# Patient Record
Sex: FEMALE | Race: BLACK OR AFRICAN AMERICAN | NOT HISPANIC OR LATINO | Employment: OTHER | URBAN - METROPOLITAN AREA
[De-identification: names, ages, dates, MRNs, and addresses within clinical notes are randomized per-mention and may not be internally consistent; named-entity substitution may affect disease eponyms.]

---

## 2017-01-15 ENCOUNTER — HOSPITAL ENCOUNTER (EMERGENCY)
Facility: HOSPITAL | Age: 47
Discharge: HOME | End: 2017-01-15
Attending: EMERGENCY MEDICINE
Payer: MEDICAID

## 2017-01-15 VITALS
HEART RATE: 80 BPM | DIASTOLIC BLOOD PRESSURE: 92 MMHG | SYSTOLIC BLOOD PRESSURE: 135 MMHG | WEIGHT: 160 LBS | HEIGHT: 63 IN | RESPIRATION RATE: 17 BRPM | BODY MASS INDEX: 28.35 KG/M2 | OXYGEN SATURATION: 100 % | TEMPERATURE: 98 F

## 2017-01-15 DIAGNOSIS — R51.9 ACUTE NONINTRACTABLE HEADACHE, UNSPECIFIED HEADACHE TYPE: Primary | ICD-10-CM

## 2017-01-15 DIAGNOSIS — R11.0 NAUSEA: ICD-10-CM

## 2017-01-15 LAB
B-HCG UR QL: NEGATIVE
BILIRUB UR QL STRIP: NEGATIVE
CLARITY UR: CLEAR
COLOR UR: YELLOW
CTP QC/QA: YES
GLUCOSE UR QL STRIP: NEGATIVE
HGB UR QL STRIP: ABNORMAL
KETONES UR QL STRIP: NEGATIVE
LEUKOCYTE ESTERASE UR QL STRIP: NEGATIVE
NITRITE UR QL STRIP: NEGATIVE
PH UR STRIP: 6 [PH] (ref 5–8)
PROT UR QL STRIP: NEGATIVE
SP GR UR STRIP: 1.02 (ref 1–1.03)
URN SPEC COLLECT METH UR: ABNORMAL
UROBILINOGEN UR STRIP-ACNC: NEGATIVE EU/DL

## 2017-01-15 PROCEDURE — 63600175 PHARM REV CODE 636 W HCPCS: Performed by: PHYSICIAN ASSISTANT

## 2017-01-15 PROCEDURE — 99283 EMERGENCY DEPT VISIT LOW MDM: CPT | Mod: 25

## 2017-01-15 PROCEDURE — 81003 URINALYSIS AUTO W/O SCOPE: CPT

## 2017-01-15 PROCEDURE — 96372 THER/PROPH/DIAG INJ SC/IM: CPT

## 2017-01-15 RX ORDER — KETOROLAC TROMETHAMINE 30 MG/ML
30 INJECTION, SOLUTION INTRAMUSCULAR; INTRAVENOUS
Status: COMPLETED | OUTPATIENT
Start: 2017-01-15 | End: 2017-01-15

## 2017-01-15 RX ORDER — DIPHENHYDRAMINE HYDROCHLORIDE 50 MG/ML
25 INJECTION INTRAMUSCULAR; INTRAVENOUS
Status: COMPLETED | OUTPATIENT
Start: 2017-01-15 | End: 2017-01-15

## 2017-01-15 RX ORDER — BUTALBITAL, ACETAMINOPHEN AND CAFFEINE 50; 325; 40 MG/1; MG/1; MG/1
1 TABLET ORAL EVERY 4 HOURS PRN
Qty: 15 TABLET | Refills: 0 | Status: SHIPPED | OUTPATIENT
Start: 2017-01-15 | End: 2017-02-14

## 2017-01-15 RX ORDER — PROCHLORPERAZINE MALEATE 5 MG
10 TABLET ORAL
Status: COMPLETED | OUTPATIENT
Start: 2017-01-15 | End: 2017-01-15

## 2017-01-15 RX ORDER — DIPHENHYDRAMINE HCL 25 MG
25 CAPSULE ORAL
Status: DISCONTINUED | OUTPATIENT
Start: 2017-01-15 | End: 2017-01-15

## 2017-01-15 RX ADMIN — PROCHLORPERAZINE MALEATE 10 MG: 5 TABLET, FILM COATED ORAL at 12:01

## 2017-01-15 RX ADMIN — DIPHENHYDRAMINE HYDROCHLORIDE 25 MG: 50 INJECTION, SOLUTION INTRAMUSCULAR; INTRAVENOUS at 12:01

## 2017-01-15 RX ADMIN — KETOROLAC TROMETHAMINE 30 MG: 30 INJECTION, SOLUTION INTRAMUSCULAR at 12:01

## 2017-01-15 NOTE — ED PROVIDER NOTES
Encounter Date: 1/15/2017       History     Chief Complaint   Patient presents with    Headache     intermittent everyday for one month; states has seen pcp and was not prescribed anything; tylenol and ibuprofen give no relief; frontal; rates pain 10/10    Flank Pain     right sided x1 day; denies dysuria or vaginal discharge;      Review of patient's allergies indicates:  No Known Allergies  Patient is a 46 y.o. female presenting with the following complaint: headaches and abdominal pain. The history is provided by the patient.   Headache    This is a new problem. The current episode started more than 1 month ago. The problem occurs constantly. The problem has been unchanged. The pain is located in the frontal region. The pain radiates to the left neck. The pain quality is not similar to prior headaches. The quality of the pain is described as sharp. Associated symptoms include nausea, photophobia and vomiting. Pertinent negatives include no abdominal pain, back pain, fever or rhinorrhea. The symptoms are aggravated by bright light. She has tried acetaminophen for the symptoms. The treatment provided no relief. Her past medical history is significant for hypertension. There is no history of migraine headaches, migraines in the family, recent head traumas, sinus disease or TMJ.   Abdominal Pain   The other symptoms of the illness include nausea and vomiting. The other symptoms of the illness do not include fever, dysuria, hematemesis, hematochezia, vaginal discharge or vaginal bleeding.   Nausea began today. The nausea is exacerbated by motion.   The vomiting began yesterday. Vomiting occurs 2 to 5 times per day. The emesis contains stomach contents.   The patient states that she believes she is currently not pregnant. The patient has not had a change in bowel habit. Symptoms associated with the illness do not include chills, constipation, urgency, hematuria, frequency or back pain. Significant associated medical  issues do not include gallstones or diverticulitis.     Past Medical History   Diagnosis Date    Hypertension      No past medical history pertinent negatives.  History reviewed. No pertinent past surgical history.  History reviewed. No pertinent family history.  Social History   Substance Use Topics    Smoking status: Never Smoker    Smokeless tobacco: None    Alcohol use No     Review of Systems   Constitutional: Negative for chills and fever.   HENT: Negative for rhinorrhea.    Eyes: Positive for photophobia.   Gastrointestinal: Positive for nausea and vomiting. Negative for abdominal pain, constipation, hematemesis and hematochezia.   Genitourinary: Negative for dysuria, frequency, hematuria, urgency, vaginal bleeding and vaginal discharge.   Musculoskeletal: Negative for back pain.   Neurological: Positive for headaches.       Physical Exam   Initial Vitals   BP Pulse Resp Temp SpO2   01/15/17 1135 01/15/17 1135 01/15/17 1135 01/15/17 1135 01/15/17 1135   117/78 96 14 97.9 °F (36.6 °C) 97 %     Physical Exam    Nursing note and vitals reviewed.  Constitutional: Vital signs are normal. She appears well-developed and well-nourished. No distress.   HENT:   Head: Normocephalic and atraumatic.   Right Ear: External ear normal.   Left Ear: External ear normal.   Nose: Nose normal.   Mouth/Throat: Oropharynx is clear and moist.   Eyes: Conjunctivae and EOM are normal.   Neck: Normal range of motion. No tracheal deviation present.   Cardiovascular: Normal rate and regular rhythm.   Pulmonary/Chest: Breath sounds normal. No respiratory distress.   Abdominal: Soft. Bowel sounds are normal. She exhibits no distension. There is no tenderness. There is no rebound and no guarding.   Musculoskeletal: Normal range of motion. She exhibits no tenderness.   Neurological: She is alert and oriented to person, place, and time. She has normal strength. No cranial nerve deficit or sensory deficit. She displays a negative Romberg  "sign. Coordination and gait normal. GCS eye subscore is 4. GCS verbal subscore is 5. GCS motor subscore is 6.   Reflex Scores:       Patellar reflexes are 2+ on the right side and 2+ on the left side.  Skin: Skin is warm and dry. No rash noted. No erythema.   Psychiatric: She has a normal mood and affect. Thought content normal.         ED Course   Procedures  Labs Reviewed   URINALYSIS - Abnormal; Notable for the following:        Result Value    Occult Blood UA Trace (*)     All other components within normal limits             Medical Decision Making:   Initial Assessment:   Headache, nausea   Differential Diagnosis:   Tension headache, pseudotumor cerebri, temporal arteritis, ICH/SAH, Intracranial mass, migraine, meningitis, cluster headache, sinus headache    Clinical Tests:   Lab Tests: Ordered and Reviewed  ED Management:  UA showed no evidence of UTI.  Feel the patient's headache is benign.  The headache completely resolved with benadryl, toradol, compazine.  No neck stiffness, vision changes, fever, rash, meningismus/neck stiffness to suggest pseudotumor cerebri or meningitis.  No pain over temporal arteries or vision changes/loss to suggest temporal arteritis.  No "thunderclap onset" or neck stiffness to suggest spontaneous SAH/ICH.  No lancinated pain to eyes with tearing to suggest cluster headache.  No sinus pressure or nasal congestion to suggest sinus headache.  Patient's headache is not in a "band like" distrubution to suggest tension headache.  Feel nausea is related to HA as abdominal exam was benign.  Nausea was resolved with medication.  Instructed to f/u with PCP if pain continues.  Given strict precautions for return to ED and verbalized understanding  RX: Fioricet                Attending Attestation:     Physician Attestation Statement for NP/PA:   I have conducted a face to face encounter with this patient in addition to the NP/PA, due to NP/PA Request    Other NP/PA Attestation Additions:  "   History of Present Illness: 46-year-old female with a frontal headache that is been intermittent over the past few weeks.  No fever, neck pain or stiffness.  Unremarkable physical exam.   Physical Exam: Unremarkable physical exam.  Cranial nerves intact.                  ED Course     Clinical Impression:   The primary encounter diagnosis was Acute nonintractable headache, unspecified headache type. A diagnosis of Nausea was also pertinent to this visit.          Nuris Munson PA-C  01/15/17 1750       Sadi Galnido MD  01/15/17 1160

## 2017-01-15 NOTE — ED AVS SNAPSHOT
[facility] [address]  [place] [address]               Lorelei Norris   1/15/2017 11:53 AM   ED    Description:  Female : 1970   Department:  [facility]           Your Care was Coordinated By:     Provider Role From To    Sadi Galindo MD Attending Provider 01/15/17 9214 --    Nuris Munson PA-C Physician Assistant 01/15/17 1201 --      Reason for Visit     Headache     Flank Pain           Diagnoses this Visit        Comments    Acute nonintractable headache, unspecified headache type    -  Primary       ED Disposition     None           To Do List           Follow-up Information     Follow up with [facility] In 3 days.    Specialty:  Family Medicine    Contact information:     [facility] [id number] [phone]       These Medications        Disp Refills Start End    butalbital-acetaminophen-caffeine -40 mg (FIORICET, ESGIC) -40 mg per tablet 15 tablet 0 1/15/2017 2017    Take 1 tablet by mouth every 4 (four) hours as needed for Pain. - Oral      [facility]     [facility] -  Assistance  Registered nurses in the [facility] provide clinical advisement, health education, appointment booking, and other advisory services.  Call for this free service at [phone].             Medications           Message regarding Medications     Verify the changes and/or additions to your medication regime listed below are the same as discussed with your clinician today.  If any of these changes or additions are incorrect, please notify your healthcare provider.        START taking these NEW medications        Refills    butalbital-acetaminophen-caffeine -40 mg (FIORICET, ESGIC) -40 mg per tablet 0    Sig: Take 1 tablet by mouth every 4 (four) hours as needed for Pain.    Class: Print    Route: Oral      These medications were  "administered today        Dose Freq    ketorolac injection 30 mg 30 mg ED 1 Time    Sig: Inject 30 mg into the muscle ED 1 Time.    Class: Normal    Route: Intramuscular    Cosign for Ordering: Required by Miguel Perez MD    prochlorperazine tablet 10 mg 10 mg ED 1 Time    Sig: Take 2 tablets (10 mg total) by mouth ED 1 Time.    Class: Normal    Route: Oral    Cosign for Ordering: Required by Miguel Perez MD    diphenhydrAMINE injection 25 mg 25 mg ED 1 Time    Sig: Inject 0.5 mLs (25 mg total) into the muscle ED 1 Time.    Class: Normal    Route: Intramuscular    Cosign for Ordering: Required by Miguel Perez MD           Verify that the below list of medications is an accurate representation of the medications you are currently taking.  If none reported, the list may be blank. If incorrect, please contact your healthcare provider. Carry this list with you in case of emergency.           Current Medications     butalbital-acetaminophen-caffeine -40 mg (FIORICET, ESGIC) -40 mg per tablet Take 1 tablet by mouth every 4 (four) hours as needed for Pain.    lisinopril 10 MG tablet Take 1 tablet (10 mg total) by mouth once daily.           Clinical Reference Information           Your Vitals Were     BP Pulse Temp Resp Height Weight    117/78 96 97.9 °F (36.6 °C) (Oral) 14 5' 3" (1.6 m) 72.6 kg (160 lb)    Last Period SpO2 BMI          01/01/2017 97% 28.34 kg/m2        Allergies as of 1/15/2017     No Known Allergies      Immunizations Administered on Date of Encounter - 1/15/2017     None      ED Micro, Lab, POCT     Start Ordered       Status Ordering Provider    01/15/17 1204 01/15/17 1204  Urinalysis  STAT      Final result     01/15/17 0000 01/15/17 1212  POCT urine pregnancy     Comments:  This order was created through External Result Entry    Completed       ED Imaging Orders     None        Discharge Instructions         Self-Care for Headaches  Most headaches aren't serious and can " "be relieved with self-care. But some headaches may be a sign of another health problem like eye trouble or high blood pressure. To find the best treatment, learn what kind of headaches you get. For tension headaches, self-care will usually help. To treat migraines, ask your healthcare provider for advice. It is also possible to get both tension and migraine headaches. Self-care involves relieving the pain and avoiding headache triggers if you can.    Ways to reduce pain and tension  Try these steps:  · Apply a cold compress or ice pack to the pain site.  · Drink fluids. If nausea makes it hard to drink, try sucking on ice.  · Rest. Protect yourself from bright light and loud noises.  · Calm your emotions by imagining a peaceful scene.  · Massage tight neck, shoulder, and head muscles.  · To relax muscles, soak in a hot bath or use a hot shower.  Use medicines  Aspirin or aspirin substitutes, such as ibuprofen and acetaminophen, can relieve headache. Remember: Never give aspirin to anyone 18 years old or younger because of the risk of developing Reye syndrome. Use pain medicines only when necessary.  Track your headaches  Keeping a headache diary can help you and your healthcare provider identify what's causing your headaches:  · Note when each headache happens.  · Identify your activities and the foods you've eaten 6 to 8 hours before the headache began.  · Look for any trends or "triggers."  Signs of tension headache  Any of the following can be signs:  · Dull pain or feeling of pressure in a tight band around your head  · Pain in your neck or shoulders  · Headache without a definite beginning or end  · Headache after an activity such as driving or working on a computer  Signs of migraine  Any of the following can be signs:  · Throbbing pain on one or both sides of your head  · Nausea or vomiting  · Extreme sensitivity to light, sound, and smells  · Bright spots, flashes, or other visual changes  · Pain or nausea " "so severe that you can't continue your daily activities  Call your healthcare provider   If you have any of the following symptoms, contact your healthcare provider:  · A headache that lingers after a recent injury or bump to the head.  · A fever with a stiff neck or pain when you bend your head toward your chest.  · A headache along with slurred speech, changes in your vision, or numbness or weakness in your arms or legs.  · A headache for longer than 3 days.  · Frequent headaches, especially in the morning.  · Headaches with seizures   · Seek immediate medical attention if you have a headache that you would call "the worst headache you have ever had."   © 1889-1606 [employer]. [address] [place] [address], [place] [address]. All rights reserved. This information is not intended as a substitute for professional medical care. Always follow your healthcare professional's instructions.          [facility] Sign-Up     Activating your [facility] account is as easy as 1-2-3!     1) Visit [email or web address], select Sign Up Now, enter this activation code and your date of birth, then select Next.  [email or web address]  Expires: 3/1/2017  1:18 PM      2) Create a username and password to use when you visit [facility] in the future and select a security question in case you lose your password and select Next.    3) Enter your e-mail address and click Sign Up!    Additional Information  If you have questions, please e-mail [email or web address] or call [phone] to talk to our [facility] staff. Remember, [facility] is NOT to be used for urgent needs. For medical emergencies, dial 911.          [facility] complies with applicable Federal civil rights laws and does not discriminate on the basis of race, color, national origin, age, disability, or sex.        Language Assistance Services     ATTENTION: Language assistance services are available, free of charge. Please call [phone].      ATENCIÓN: Si " habla español, tiene a palma disposición servicios gratuitos de asistencia lingüística. Llame al [phone].     CHÚ Ý: N?u b?n nói Ti?ng Vi?t, có các d?ch v? h? tr? ngôn ng? mi?n phí dành cho b?n. G?i s? [phone].

## 2017-01-15 NOTE — ED NOTES
Pt c/o HA daily for 1 month.  Pt states that she has been taking OTC medication without relief.  Pt also c/o R sided flank pain that radiates to RLQ x 2 days.  Pt states she had 2 episodes of emesis for the last two days at night. Pt denies diarrhea.

## 2017-01-15 NOTE — DISCHARGE INSTRUCTIONS
"  Self-Care for Headaches  Most headaches aren't serious and can be relieved with self-care. But some headaches may be a sign of another health problem like eye trouble or high blood pressure. To find the best treatment, learn what kind of headaches you get. For tension headaches, self-care will usually help. To treat migraines, ask your healthcare provider for advice. It is also possible to get both tension and migraine headaches. Self-care involves relieving the pain and avoiding headache triggers if you can.    Ways to reduce pain and tension  Try these steps:  · Apply a cold compress or ice pack to the pain site.  · Drink fluids. If nausea makes it hard to drink, try sucking on ice.  · Rest. Protect yourself from bright light and loud noises.  · Calm your emotions by imagining a peaceful scene.  · Massage tight neck, shoulder, and head muscles.  · To relax muscles, soak in a hot bath or use a hot shower.  Use medicines  Aspirin or aspirin substitutes, such as ibuprofen and acetaminophen, can relieve headache. Remember: Never give aspirin to anyone 18 years old or younger because of the risk of developing Reye syndrome. Use pain medicines only when necessary.  Track your headaches  Keeping a headache diary can help you and your healthcare provider identify what's causing your headaches:  · Note when each headache happens.  · Identify your activities and the foods you've eaten 6 to 8 hours before the headache began.  · Look for any trends or "triggers."  Signs of tension headache  Any of the following can be signs:  · Dull pain or feeling of pressure in a tight band around your head  · Pain in your neck or shoulders  · Headache without a definite beginning or end  · Headache after an activity such as driving or working on a computer  Signs of migraine  Any of the following can be signs:  · Throbbing pain on one or both sides of your head  · Nausea or vomiting  · Extreme sensitivity to light, sound, and " "smells  · Bright spots, flashes, or other visual changes  · Pain or nausea so severe that you can't continue your daily activities  Call your healthcare provider   If you have any of the following symptoms, contact your healthcare provider:  · A headache that lingers after a recent injury or bump to the head.  · A fever with a stiff neck or pain when you bend your head toward your chest.  · A headache along with slurred speech, changes in your vision, or numbness or weakness in your arms or legs.  · A headache for longer than 3 days.  · Frequent headaches, especially in the morning.  · Headaches with seizures   · Seek immediate medical attention if you have a headache that you would call "the worst headache you have ever had."   © 9011-6871 [employer] [address] [place] [address], [place] [address]. All rights reserved. This information is not intended as a substitute for professional medical care. Always follow your healthcare professional's instructions.        "

## 2017-03-20 ENCOUNTER — HOSPITAL ENCOUNTER (EMERGENCY)
Facility: HOSPITAL | Age: 47
Discharge: HOME | End: 2017-03-20
Attending: EMERGENCY MEDICINE
Payer: MEDICAID

## 2017-03-20 VITALS
BODY MASS INDEX: 31.89 KG/M2 | OXYGEN SATURATION: 99 % | TEMPERATURE: 98 F | HEIGHT: 63 IN | DIASTOLIC BLOOD PRESSURE: 83 MMHG | HEART RATE: 82 BPM | RESPIRATION RATE: 16 BRPM | SYSTOLIC BLOOD PRESSURE: 126 MMHG | WEIGHT: 180 LBS

## 2017-03-20 DIAGNOSIS — R22.1 THROAT SWELLING: Primary | ICD-10-CM

## 2017-03-20 LAB — DEPRECATED S PYO AG THROAT QL EIA: NEGATIVE

## 2017-03-20 PROCEDURE — 87880 STREP A ASSAY W/OPTIC: CPT

## 2017-03-20 PROCEDURE — 87081 CULTURE SCREEN ONLY: CPT

## 2017-03-20 PROCEDURE — 99283 EMERGENCY DEPT VISIT LOW MDM: CPT

## 2017-03-20 PROCEDURE — 87147 CULTURE TYPE IMMUNOLOGIC: CPT

## 2017-03-20 RX ORDER — EPINEPHRINE 0.3 MG/.3ML
1 INJECTION SUBCUTANEOUS
Qty: 1 DEVICE | Refills: 0 | Status: SHIPPED | OUTPATIENT
Start: 2017-03-20 | End: 2023-03-18

## 2017-03-20 NOTE — ED PROVIDER NOTES
Encounter Date: 3/20/2017       History     Chief Complaint   Patient presents with    Oral Swelling     c/o swelling to throat and tongue;also c/o sore throat and dry cough x1.5 weeks; states started taking lisinopril 2 weeks ago; able to swallow secretions and speak in complete sentences      Review of patient's allergies indicates:   Allergen Reactions    Lisinopril Swelling     HPI Comments: Pt states seen at [facility] for same complaint 2 weeks ago, given steroids and another IM injection, symptoms unchanged, worse when lying flat.    Patient is a 47 y.o. female presenting with the following complaint: sore throat. The history is provided by the patient.   Sore Throat   This is a new problem. The current episode started more than 1 week ago. The problem occurs constantly. The problem has not changed since onset.Pertinent negatives include no chest pain and no shortness of breath. Exacerbated by: laying flat. The treatment provided no relief.     Past Medical History:   Diagnosis Date    Hypertension      History reviewed. No pertinent surgical history.  History reviewed. No pertinent family history.  Social History   Substance Use Topics    Smoking status: Never Smoker    Smokeless tobacco: None    Alcohol use No     Review of Systems   Constitutional: Negative for chills and fever.   HENT: Positive for sore throat. Negative for drooling and facial swelling.    Respiratory: Negative for shortness of breath.    Cardiovascular: Negative for chest pain.   All other systems reviewed and are negative.      Physical Exam   Initial Vitals   BP Pulse Resp Temp SpO2   03/20/17 1257 03/20/17 1257 03/20/17 1257 03/20/17 1257 03/20/17 1257   163/90 91 20 98.3 °F (36.8 °C) 99 %     Physical Exam    Nursing note and vitals reviewed.  Constitutional: She appears well-developed and well-nourished. No distress.   HENT:   Head: Normocephalic and atraumatic.   R sided tongue edema isolated, no soft palate lip swelling   Eyes:  Conjunctivae and EOM are normal.   Neck: Normal range of motion. Neck supple.   Cardiovascular: Normal rate, regular rhythm and intact distal pulses.   Pulmonary/Chest: Breath sounds normal. No respiratory distress.   Abdominal: Soft. There is no tenderness.   Musculoskeletal: Normal range of motion. She exhibits no edema.   Neurological: She is alert and oriented to person, place, and time. She has normal strength.   Skin: Skin is warm and dry.   Psychiatric: She has a normal mood and affect.         ED Course   Procedures  Labs Reviewed   THROAT SCREEN, RAPID   CULTURE, STREP A,  THROAT             Medical Decision Making:   Differential Diagnosis:   Angioedema, ludwigs, epiglottitis  ED Management:  Pt does not appear to have any airway issues today, no objective findings on exam with exception for redundant tonsillar tissue and neck soft tissue.  Does not appear to have angioedema or ludwigs.  Will discharge with epi pen given subjective complaint but will need PCP follow up, dc lisinopril, and ENT follow up                   ED Course     Clinical Impression:   The encounter diagnosis was Throat swelling.    Disposition:   Disposition: Discharged  Condition: Stable       Guy J. Lefort, MD  03/20/17 1432       Guy J. Lefort, MD  04/05/17 6130

## 2017-03-20 NOTE — ED TRIAGE NOTES
Pt states having swellin and throat pain for the past month, seen at [facility] roseanne Frankel 1 month ago and [facility] ED 1 week ago.  States trying to get tonsils removed but having trouble with transferring records.  States saw an ENT in [place] but now locally, just moved here recently.

## 2017-03-20 NOTE — ED AVS SNAPSHOT
[facility]  [address] [place] [address]               Lorelei Norris   3/20/2017  1:02 PM   ED    Description:  Female : 1970   Department:  [facility]           Your Care was Coordinated By:     Provider Role From To    Guy J. Lefort, MD Attending Provider 17 1332 --      Reason for Visit     Oral Swelling           Diagnoses this Visit        Comments    Throat swelling    -  Primary       ED Disposition     None           To Do List           Follow-up Information     Follow up with Primary Doctor No In 2 days.        Follow up with [facility].    Specialty:  Emergency Medicine    Why:  If symptoms worsen or any other concerns    Contact information:    [address]  [place] [address] [phone]       These Medications        Disp Refills Start End    epinephrine (EPIPEN) 0.3 mg/0.3 mL AtIn 1 Device 0 3/20/2017 3/20/2018    Inject 0.3 mLs (0.3 mg total) into the muscle as needed. - Intramuscular      [facility]     [facility] Nurse Care Line -  Assistance  Registered nurses in the [facility] provide clinical advisement, health education, appointment booking, and other advisory services.  Call for this free service at [phone].             Medications           Message regarding Medications     Verify the changes and/or additions to your medication regime listed below are the same as discussed with your clinician today.  If any of these changes or additions are incorrect, please notify your healthcare provider.        START taking these NEW medications        Refills    epinephrine (EPIPEN) 0.3 mg/0.3 mL AtIn 0    Sig: Inject 0.3 mLs (0.3 mg total) into the muscle as needed.    Class: Print    Route: Intramuscular      STOP taking these medications     lisinopril 10 MG tablet Take 1 tablet (10 mg total) by mouth once daily.           Verify that the below list  "of medications is an accurate representation of the medications you are currently taking.  If none reported, the list may be blank. If incorrect, please contact your healthcare provider. Carry this list with you in case of emergency.           Current Medications     epinephrine (EPIPEN) 0.3 mg/0.3 mL AtIn Inject 0.3 mLs (0.3 mg total) into the muscle as needed.           Clinical Reference Information           Your Vitals Were     BP Pulse Temp Resp Height Weight    163/90 (BP Location: Right arm, Patient Position: Sitting) 82 98.3 °F (36.8 °C) (Oral) 16 5' 3" (1.6 m) 81.6 kg (180 lb)    SpO2 BMI             99% 31.89 kg/m2         Allergies as of 3/20/2017        Reactions    Lisinopril Swelling      Immunizations Administered on Date of Encounter - 3/20/2017     None      ED Micro, Lab, POCT     Start Ordered       Status Ordering Provider    03/20/17 1311 03/20/17 1310  Rapid strep screen  STAT      Final result     03/20/17 1310 03/20/17 1310  Strep A culture, throat  Once      In process       ED Imaging Orders     None      Discharge References/Attachments     ANGIOEDEMA (ENGLISH)      [facility] Sign-Up     Activating your [facility] account is as easy as 1-2-3!     1) Visit [email or web address], select Sign Up Now, enter this activation code and your date of birth, then select Next.  [email or web address]  Expires: 5/4/2017  2:27 PM      2) Create a username and password to use when you visit [facility] in the future and select a security question in case you lose your password and select Next.    3) Enter your e-mail address and click Sign Up!    Additional Information  If you have questions, please e-mail [email or web address] or call [phone] to talk to our [facility] staff. Remember, [facility] is NOT to be used for urgent needs. For medical emergencies, dial 911.          [facility] complies with applicable Federal civil rights laws and does not discriminate on the basis of race, color, " national origin, age, disability, or sex.        Language Assistance Services     ATTENTION: Language assistance services are available, free of charge. Please call [phone].      ATENCIÓN: Si habla dino, tiene a palma disposición servicios gratuitos de asistencia lingüística. Llame al [phone].     CHÚ Ý: N?u b?n nói Ti?ng Vi?t, có các d?ch v? h? tr? ngôn ng? mi?n phí dành cho b?n. G?i s? [phone].

## 2017-03-22 LAB — BACTERIA THROAT CULT: NORMAL

## 2017-03-23 ENCOUNTER — OFFICE VISIT (OUTPATIENT)
Dept: FAMILY MEDICINE | Facility: HOSPITAL | Age: 47
End: 2017-03-23
Payer: MEDICAID

## 2017-03-23 VITALS
TEMPERATURE: 98 F | HEART RATE: 97 BPM | DIASTOLIC BLOOD PRESSURE: 91 MMHG | HEIGHT: 63 IN | SYSTOLIC BLOOD PRESSURE: 139 MMHG | WEIGHT: 181.19 LBS | BODY MASS INDEX: 32.11 KG/M2

## 2017-03-23 DIAGNOSIS — J45.20 MILD INTERMITTENT ASTHMA WITHOUT COMPLICATION: ICD-10-CM

## 2017-03-23 DIAGNOSIS — R10.819 SUPRAPUBIC TENDERNESS: ICD-10-CM

## 2017-03-23 DIAGNOSIS — R11.0 NAUSEA: ICD-10-CM

## 2017-03-23 DIAGNOSIS — I10 ESSENTIAL HYPERTENSION: Primary | ICD-10-CM

## 2017-03-23 DIAGNOSIS — Z12.31 SCREENING MAMMOGRAM, ENCOUNTER FOR: ICD-10-CM

## 2017-03-23 DIAGNOSIS — J35.8: ICD-10-CM

## 2017-03-23 PROCEDURE — 99215 OFFICE O/P EST HI 40 MIN: CPT | Mod: 25 | Performed by: FAMILY MEDICINE

## 2017-03-23 PROCEDURE — 96372 THER/PROPH/DIAG INJ SC/IM: CPT

## 2017-03-23 RX ORDER — FAMOTIDINE 20 MG/1
20 TABLET, FILM COATED ORAL 2 TIMES DAILY
Refills: 0 | COMMUNITY
Start: 2017-02-16 | End: 2017-04-27

## 2017-03-23 RX ORDER — PROMETHAZINE HYDROCHLORIDE 25 MG/ML
6.25 INJECTION, SOLUTION INTRAMUSCULAR; INTRAVENOUS
Status: COMPLETED | OUTPATIENT
Start: 2017-03-23 | End: 2017-03-23

## 2017-03-23 RX ORDER — METHOCARBAMOL 500 MG/1
TABLET, FILM COATED ORAL
Refills: 0 | COMMUNITY
Start: 2017-02-16 | End: 2017-08-01

## 2017-03-23 RX ORDER — CETIRIZINE HYDROCHLORIDE 10 MG/1
10 TABLET, CHEWABLE ORAL DAILY
COMMUNITY
End: 2017-04-27

## 2017-03-23 RX ORDER — ALBUTEROL SULFATE 90 UG/1
2 AEROSOL, METERED RESPIRATORY (INHALATION) EVERY 6 HOURS PRN
Qty: 18 G | Refills: 1 | Status: SHIPPED | OUTPATIENT
Start: 2017-03-23 | End: 2023-03-18 | Stop reason: SDUPTHER

## 2017-03-23 RX ORDER — BUTALBITAL, ASPIRIN AND CAFFEINE 50; 325; 40 MG/1; MG/1; MG/1
TABLET ORAL
COMMUNITY
End: 2017-08-01

## 2017-03-23 RX ORDER — PROMETHAZINE HYDROCHLORIDE 25 MG/1
25 TABLET ORAL 4 TIMES DAILY
Refills: 0 | COMMUNITY
Start: 2017-02-16 | End: 2017-08-01

## 2017-03-23 RX ORDER — LISINOPRIL 10 MG/1
10 TABLET ORAL DAILY
COMMUNITY
End: 2017-04-04

## 2017-03-23 RX ADMIN — PROMETHAZINE HYDROCHLORIDE 6.25 MG: 25 INJECTION INTRAMUSCULAR; INTRAVENOUS at 03:03

## 2017-03-23 NOTE — MR AVS SNAPSHOT
[facility]  [address], [address]  [place] [address]  Phone: [phone]  Fax: [phone]                  Lorelei Norris   3/23/2017 3:20 PM   Office Visit    Description:  Female : 1970   Provider:  Han Kc MD   Department:  [facility]           Reason for Visit     Swollen tonsils     Menorrhagia           Diagnoses this Visit        Comments    Essential hypertension    -  Primary     Screening mammogram, encounter for         Mild intermittent asthma without complication         Obstructive tonsil         Suprapubic tenderness         Nausea                To Do List           Future Appointments        Provider Department Dept Phone    3/24/2017 11:45 AM [facility] [id number] [facility] [phone]    2017 9:40 AM Han Kc MD [facility] [phone]      Goals (5 Years of Data)     None       These Medications        Disp Refills Start End    albuterol 90 mcg/actuation inhaler 18 g 1 3/23/2017 3/23/2018    Inhale 2 puffs into the lungs every 6 (six) hours as needed for Wheezing. Rescue - Inhalation    Pharmacy: [id number]  [place] - [address] Ph #: [phone]         [facility]     [facility] Nurse Care Line -  Assistance  Registered nurses in the [facility] provide clinical advisement, health education, appointment booking, and other advisory services.  Call for this free service at [phone].             Medications           Message regarding Medications     Verify the changes and/or additions to your medication regime listed below are the same as discussed with your clinician today.  If any of these changes or additions are incorrect, please notify your healthcare provider.        START taking these NEW medications        Refills    albuterol 90 mcg/actuation inhaler 1    Sig: Inhale 2 puffs into the lungs every 6 (six) hours as  "needed for Wheezing. Rescue    Class: Normal    Route: Inhalation      These medications were administered today        Dose Freq    promethazine injection 6.25 mg 6.25 mg Clinic/HOD 1 time    Sig: Inject 0.25 mLs (6.25 mg total) into the muscle one time.    Class: Normal    Route: Intramuscular           Verify that the below list of medications is an accurate representation of the medications you are currently taking.  If none reported, the list may be blank. If incorrect, please contact your healthcare provider. Carry this list with you in case of emergency.           Current Medications     butalbital-aspirin-caffeine (BUTALBITAL COMPOUND) -40 mg Tab Take by mouth.    cetirizine 10 mg chewable tablet Take 10 mg by mouth once daily.    epinephrine (EPIPEN) 0.3 mg/0.3 mL AtIn Inject 0.3 mLs (0.3 mg total) into the muscle as needed.    famotidine (PEPCID) 20 MG tablet Take 20 mg by mouth 2 (two) times daily.    lisinopril 10 MG tablet Take 10 mg by mouth once daily.    methocarbamol (ROBAXIN) 500 MG Tab TAKE 2 TABLETS BY MOUTH 4 TIMES A DAY AS NEEDED FOR MUSCLE SPASMS    promethazine (PHENERGAN) 25 MG tablet Take 25 mg by mouth 4 (four) times daily.    albuterol 90 mcg/actuation inhaler Inhale 2 puffs into the lungs every 6 (six) hours as needed for Wheezing. Rescue           Clinical Reference Information           Your Vitals Were     BP Pulse Temp Height Weight Last Period    139/91 97 97.9 °F (36.6 °C) 5' 3" (1.6 m) 82.2 kg (181 lb 3.5 oz) 02/02/2017 (Exact Date)    BMI                32.1 kg/m2          Blood Pressure          Most Recent Value    BP  (!)  139/91      Allergies as of 3/23/2017     Lisinopril      Immunizations Administered on Date of Encounter - 3/23/2017     None      Orders Placed During Today's Visit      Normal Orders This Visit    Ambulatory referral to ENT     POCT URINE DIPSTICK WITHOUT MICROSCOPE     Future Labs/Procedures Expected by Expires    CT Soft Tissue Neck WO Contrast  " 3/23/2017 3/23/2018    Mammo Digital Screening Bilateral With CAD  3/23/2017 5/23/2018      Language Assistance Services     ATTENTION: Language assistance services are available, free of charge. Please call [phone].      ATENCIÓN: Si habla dion, tiene a palma disposición servicios gratuitos de asistencia lingüística. Llame al [phone].     CHÚ Ý: N?u b?n nói Ti?ng Vi?t, có các d?ch v? h? tr? ngôn ng? mi?n phí dành cho b?n. G?i s? [phone].         [facility] complies with applicable Federal civil rights laws and does not discriminate on the basis of race, color, national origin, age, disability, or sex.

## 2017-03-24 ENCOUNTER — HOSPITAL ENCOUNTER (OUTPATIENT)
Dept: RADIOLOGY | Facility: HOSPITAL | Age: 47
Discharge: HOME | End: 2017-03-24
Attending: FAMILY MEDICINE
Payer: MEDICAID

## 2017-03-24 DIAGNOSIS — Z12.31 SCREENING MAMMOGRAM, ENCOUNTER FOR: ICD-10-CM

## 2017-03-24 DIAGNOSIS — R92.8 ABNORMAL MAMMOGRAM: Primary | ICD-10-CM

## 2017-03-24 PROCEDURE — 77067 SCR MAMMO BI INCL CAD: CPT | Mod: TC

## 2017-03-24 PROCEDURE — 77067 SCR MAMMO BI INCL CAD: CPT | Mod: 26,,, | Performed by: RADIOLOGY

## 2017-03-24 PROCEDURE — 77063 BREAST TOMOSYNTHESIS BI: CPT | Mod: 26,,, | Performed by: RADIOLOGY

## 2017-03-24 NOTE — PROGRESS NOTES
Called patient on 3/24/17, notified of mammogram results.  Notified of further testing ordered, will schedule.

## 2017-04-04 ENCOUNTER — OFFICE VISIT (OUTPATIENT)
Dept: OBSTETRICS AND GYNECOLOGY | Facility: CLINIC | Age: 47
End: 2017-04-04
Payer: MEDICAID

## 2017-04-04 ENCOUNTER — HOSPITAL ENCOUNTER (OUTPATIENT)
Dept: RADIOLOGY | Facility: HOSPITAL | Age: 47
Discharge: HOME | End: 2017-04-04
Attending: FAMILY MEDICINE
Payer: MEDICAID

## 2017-04-04 VITALS
SYSTOLIC BLOOD PRESSURE: 112 MMHG | BODY MASS INDEX: 32.23 KG/M2 | WEIGHT: 181.88 LBS | DIASTOLIC BLOOD PRESSURE: 76 MMHG | HEIGHT: 63 IN

## 2017-04-04 DIAGNOSIS — N89.8 VAGINAL DISCHARGE: ICD-10-CM

## 2017-04-04 DIAGNOSIS — N92.6 MENSES, IRREGULAR: Primary | ICD-10-CM

## 2017-04-04 DIAGNOSIS — Z11.3 SCREENING FOR STD (SEXUALLY TRANSMITTED DISEASE): ICD-10-CM

## 2017-04-04 DIAGNOSIS — J35.8: ICD-10-CM

## 2017-04-04 DIAGNOSIS — Z01.419 WELL WOMAN EXAM WITH ROUTINE GYNECOLOGICAL EXAM: ICD-10-CM

## 2017-04-04 DIAGNOSIS — N92.1 MENORRHAGIA WITH IRREGULAR CYCLE: ICD-10-CM

## 2017-04-04 PROCEDURE — 70490 CT SOFT TISSUE NECK W/O DYE: CPT | Mod: TC

## 2017-04-04 PROCEDURE — 88175 CYTOPATH C/V AUTO FLUID REDO: CPT

## 2017-04-04 PROCEDURE — 99999 PR PBB SHADOW E&M-EST. PATIENT-LVL II: CPT | Mod: PBBFAC,,, | Performed by: OBSTETRICS & GYNECOLOGY

## 2017-04-04 PROCEDURE — 99386 PREV VISIT NEW AGE 40-64: CPT | Mod: S$PBB,,, | Performed by: OBSTETRICS & GYNECOLOGY

## 2017-04-04 PROCEDURE — 70490 CT SOFT TISSUE NECK W/O DYE: CPT | Mod: 26,,, | Performed by: RADIOLOGY

## 2017-04-04 PROCEDURE — 87480 CANDIDA DNA DIR PROBE: CPT

## 2017-04-04 PROCEDURE — 87591 N.GONORRHOEAE DNA AMP PROB: CPT

## 2017-04-04 PROCEDURE — 99212 OFFICE O/P EST SF 10 MIN: CPT | Mod: PBBFAC,PO | Performed by: OBSTETRICS & GYNECOLOGY

## 2017-04-04 NOTE — PROGRESS NOTES
CC: Annual check-up    SUBJECTIVE:   47 y.o. female   for annual routine Pap and checkup. Patient's last menstrual period was 2017 (exact date)..  She complains of heavcy and long periods of vaginal bleeding for past 2 yrs.        Past Medical History:   Diagnosis Date    Abnormal Pap smear of cervix     5 years ago    Hypertension      History reviewed. No pertinent surgical history.  Social History     Social History    Marital status:      Spouse name: N/A    Number of children: N/A    Years of education: N/A     Occupational History    Not on file.     Social History Main Topics    Smoking status: Never Smoker    Smokeless tobacco: Not on file    Alcohol use No    Drug use: No    Sexual activity: Yes     Partners: Male     Birth control/ protection: None     Other Topics Concern    Not on file     Social History Narrative     History reviewed. No pertinent family history.  OB History    Para Term  AB SAB TAB Ectopic Multiple Living   4 4 4             # Outcome Date GA Lbr David/2nd Weight Sex Delivery Anes PTL Lv   4 Term 97 40w0d   F Vag-Spont      3 Term 94 40w0d   M Vag-Spont      2 Term 93 40w0d   M Vag-Spont      1 Term 91 40w0d   F Vag-Spont               Current Outpatient Prescriptions   Medication Sig Dispense Refill    albuterol 90 mcg/actuation inhaler Inhale 2 puffs into the lungs every 6 (six) hours as needed for Wheezing. Rescue 18 g 1    butalbital-aspirin-caffeine (BUTALBITAL COMPOUND) -40 mg Tab Take by mouth.      cetirizine 10 mg chewable tablet Take 10 mg by mouth once daily.      epinephrine (EPIPEN) 0.3 mg/0.3 mL AtIn Inject 0.3 mLs (0.3 mg total) into the muscle as needed. 1 Device 0    famotidine (PEPCID) 20 MG tablet Take 20 mg by mouth 2 (two) times daily.  0    methocarbamol (ROBAXIN) 500 MG Tab TAKE 2 TABLETS BY MOUTH 4 TIMES A DAY AS NEEDED FOR MUSCLE SPASMS  0    promethazine (PHENERGAN) 25 MG tablet  "Take 25 mg by mouth 4 (four) times daily.  0     No current facility-administered medications for this visit.      Allergies: Lisinopril     ROS:  Constitutional: no weight loss, weight gain, fever, fatigue  Eyes:  No vision changes, glasses/contacts  ENT/Mouth: No ulcers, sinus problems, ears ringing, headache  Cardiovascular: No inability to lie flat, chest pain, exercise intolerance, swelling, heart palpitations  Respiratory: No wheezing, coughing blood, shortness of breath, or cough  Gastrointestinal: No diarrhea, bloody stool, nausea/vomiting, constipation, gas, hemorrhoids  Genitourinary: No blood in urine, painful urination, urgency of urination, frequency of urination, incomplete emptying, incontinence, abnormal bleeding, painful periods, heavy periods, vaginal discharge, vaginal odor, painful intercourse, sexual problems, bleeding after intercourse.  Musculoskeletal: No muscle weakness  Skin/Breast: No painful breasts, nipple discharge, masses, rash, ulcers  Neurological: No passing out, seizures, numbness, headache  Endocrine: No diabetes, hypothyroid, hyperthyroid, hot flashes, hair loss, abnormal hair growth, ance  Psychiatric: No depression, crying  Hematologic: No bruises, bleeding, swollen lymph nodes, anemia.      OBJECTIVE:   The patient appears well, alert, oriented x 3, in no distress.  /76  Ht 5' 3" (1.6 m)  Wt 82.5 kg (181 lb 14.1 oz)  LMP 02/02/2017 (Exact Date)  BMI 32.22 kg/m2  NECK: swollen tender tonsils  SKIN: no acne, striae, hirsutism  BREAST EXAM: breasts appear normal, no suspicious masses, no skin or nipple changes or axillary nodes  ABDOMEN: no hernias, masses, or hepatosplenomegaly  GENITALIA: normal external genitalia, no erythema, no discharge  URETHRA: normal urethra, normal urethral meatus  VAGINA: Normal  CERVIX: no lesions or cervical motion tenderness  UTERUS: normal  ADNEXA: normal adnexa      ASSESSMENT:   well woman  1. Menses, irregular    2. Menorrhagia with " irregular cycle    3. Well woman exam with routine gynecological exam    4. Screening for STD (sexually transmitted disease)    5. Vaginal discharge        PLAN:   mammogram  pap smear  additional lab tests per orders  return annually or prn  Orders Placed This Encounter    C. trachomatis/N. gonorrhoeae by AMP DNA Cervix    Vaginosis Screen by DNA Probe    CBC auto differential    TSH    Liquid-based pap smear, screening    US OB/GYN Procedure ([facility])-Future   rtc after u/s for embx and to discuss tx for AUB  Referral to [facility]

## 2017-04-05 LAB
C TRACH DNA SPEC QL NAA+PROBE: NOT DETECTED
CANDIDA RRNA VAG QL PROBE: NEGATIVE
G VAGINALIS RRNA GENITAL QL PROBE: POSITIVE
N GONORRHOEA DNA SPEC QL NAA+PROBE: NOT DETECTED
T VAGINALIS RRNA GENITAL QL PROBE: NEGATIVE

## 2017-04-06 ENCOUNTER — TELEPHONE (OUTPATIENT)
Dept: OBSTETRICS AND GYNECOLOGY | Facility: HOSPITAL | Age: 47
End: 2017-04-06

## 2017-04-06 RX ORDER — METRONIDAZOLE 500 MG/1
500 TABLET ORAL EVERY 12 HOURS
Qty: 14 TABLET | Refills: 0 | Status: SHIPPED | OUTPATIENT
Start: 2017-04-06 | End: 2017-04-13

## 2017-04-11 ENCOUNTER — OFFICE VISIT (OUTPATIENT)
Dept: OBSTETRICS AND GYNECOLOGY | Facility: CLINIC | Age: 47
End: 2017-04-11
Payer: MEDICAID

## 2017-04-11 ENCOUNTER — TELEPHONE (OUTPATIENT)
Dept: FAMILY MEDICINE | Facility: HOSPITAL | Age: 47
End: 2017-04-11

## 2017-04-11 ENCOUNTER — TELEPHONE (OUTPATIENT)
Dept: OBSTETRICS AND GYNECOLOGY | Facility: CLINIC | Age: 47
End: 2017-04-11

## 2017-04-11 VITALS
SYSTOLIC BLOOD PRESSURE: 116 MMHG | HEIGHT: 63 IN | BODY MASS INDEX: 32.5 KG/M2 | DIASTOLIC BLOOD PRESSURE: 72 MMHG | WEIGHT: 183.44 LBS

## 2017-04-11 DIAGNOSIS — N93.9 VAGINAL BLEEDING: Primary | ICD-10-CM

## 2017-04-11 DIAGNOSIS — R92.8 ABNORMAL MAMMOGRAM: Primary | ICD-10-CM

## 2017-04-11 DIAGNOSIS — N92.1 MENORRHAGIA WITH IRREGULAR CYCLE: ICD-10-CM

## 2017-04-11 PROCEDURE — 99213 OFFICE O/P EST LOW 20 MIN: CPT | Mod: PBBFAC,PO | Performed by: OBSTETRICS & GYNECOLOGY

## 2017-04-11 PROCEDURE — 58100 BIOPSY OF UTERUS LINING: CPT | Mod: S$PBB,,, | Performed by: OBSTETRICS & GYNECOLOGY

## 2017-04-11 PROCEDURE — 76830 TRANSVAGINAL US NON-OB: CPT | Mod: 26,S$PBB,, | Performed by: OBSTETRICS & GYNECOLOGY

## 2017-04-11 PROCEDURE — 99999 PR PBB SHADOW E&M-EST. PATIENT-LVL III: CPT | Mod: PBBFAC,,, | Performed by: OBSTETRICS & GYNECOLOGY

## 2017-04-11 PROCEDURE — 99213 OFFICE O/P EST LOW 20 MIN: CPT | Mod: S$PBB,25,, | Performed by: OBSTETRICS & GYNECOLOGY

## 2017-04-11 PROCEDURE — 58100 BIOPSY OF UTERUS LINING: CPT | Mod: PBBFAC,PO | Performed by: OBSTETRICS & GYNECOLOGY

## 2017-04-11 NOTE — TELEPHONE ENCOUNTER
Lorelei Hi NP called [phone]  Patient needs referral for ENT for tonsils removed. Can't get OB surgery until tonsils are removed.   Valencia Goodwin LPN

## 2017-04-11 NOTE — TELEPHONE ENCOUNTER
Left message with person who answered emergency contact explaining pt needed to call back to get labs done asap!

## 2017-04-11 NOTE — PROGRESS NOTES
Loerlei Norris is a 47 y.o. female [id number] complains of heavcy and long periods of vaginal bleeding for past 2 yrs.  The risks of uterine hyperplasia, neoplsia, and cancer were explained to the patient, as well as the likelihood of abnormal or anovulatory bleeding.  The rationale for evaluation with endometrial biopsy and pelvic ultrasound was discussed, and the patient verbalized understanding.  She agrees to proceed with evaluation of the uterine lining.    Urine pregnancy testing not done.  H/o BTL  Past Medical History:   Diagnosis Date    Abnormal Pap smear of cervix     5 years ago    Hypertension      History reviewed. No pertinent surgical history.    Current Outpatient Prescriptions:     albuterol 90 mcg/actuation inhaler, Inhale 2 puffs into the lungs every 6 (six) hours as needed for Wheezing. Rescue, Disp: 18 g, Rfl: 1    butalbital-aspirin-caffeine (BUTALBITAL COMPOUND) -40 mg Tab, Take by mouth., Disp: , Rfl:     cetirizine 10 mg chewable tablet, Take 10 mg by mouth once daily., Disp: , Rfl:     epinephrine (EPIPEN) 0.3 mg/0.3 mL AtIn, Inject 0.3 mLs (0.3 mg total) into the muscle as needed., Disp: 1 Device, Rfl: 0    famotidine (PEPCID) 20 MG tablet, Take 20 mg by mouth 2 (two) times daily., Disp: , Rfl: 0    methocarbamol (ROBAXIN) 500 MG Tab, TAKE 2 TABLETS BY MOUTH 4 TIMES A DAY AS NEEDED FOR MUSCLE SPASMS, Disp: , Rfl: 0    metronidazole (FLAGYL) 500 MG tablet, Take 1 tablet (500 mg total) by mouth every 12 (twelve) hours., Disp: 14 tablet, Rfl: 0    promethazine (PHENERGAN) 25 MG tablet, Take 25 mg by mouth 4 (four) times daily., Disp: , Rfl: 0  History reviewed. No pertinent family history.    The patient does not have a medical condition, history of anticoagulation,  Or other evident reason for bleeding at this time.      Vitals:    17 1403   BP: 116/72     GENERAL: alert, appears stated age and cooperative  NECK:  no thyromegaly, trachea midline enlarged tonsils  ABDOMEN:   no hernias, masses, hepatosplenomegaly  EXTERNAL GENITALIA:  normal general appearance  URETHRA:  normal urethra, normal urethral meatus  VAGINA:  Normal  CERVIX:  Normal  UTERUS:  normal size  ADNEXA:  not indicated and normal adnexa in size, nontender and no masses      U/s-- thickened ES with polyp    ENDOMETRIAL BIOPSY:    The patient was informed of the risk of bleeding, infection, uterine perforation, and pain.  She was counseled that the test will rule-out endometrial cancer with an accuracy greater than 96%.  She was counseled on the alternatives to endometrial biopsy and agrees to proceed.  A time out was performed.    Anterior lip of the cervix was grasped with a single tooth tenaculum.  Pap was not done.  A sterile endometrial pipelle was inserted into the uterine cavity.  The uterus sounds to 9cm.  A moderate of tissue was obtained.  The patient tolerated the procedure well.    All tissue was sent to pathology.    Assessment:    1. Abnml uterine bleeding    Plan:  Endometrial biopsy results ordered.  Ultrasound was  Done    There are no diagnoses linked to this encounter.    Follow up:   Wants ablation at time of D&C hysteroscopy-will schedule  Referral to anesthesia to determine if pt is eligible for elective procedure bf having tonsillectomy, discussed with Balta Sr and they will see her today  Need TSH and CBD done

## 2017-04-12 PROCEDURE — 88305 TISSUE EXAM BY PATHOLOGIST: CPT | Mod: 26,,, | Performed by: PATHOLOGY

## 2017-04-12 PROCEDURE — 88305 TISSUE EXAM BY PATHOLOGIST: CPT | Performed by: PATHOLOGY

## 2017-04-13 ENCOUNTER — HOSPITAL ENCOUNTER (OUTPATIENT)
Dept: RADIOLOGY | Facility: HOSPITAL | Age: 47
Discharge: HOME | End: 2017-04-13
Attending: FAMILY MEDICINE
Payer: MEDICAID

## 2017-04-13 DIAGNOSIS — R92.8 ABNORMAL MAMMOGRAM: ICD-10-CM

## 2017-04-13 PROCEDURE — 76642 ULTRASOUND BREAST LIMITED: CPT | Mod: 26,RT,, | Performed by: RADIOLOGY

## 2017-04-13 PROCEDURE — 77061 BREAST TOMOSYNTHESIS UNI: CPT | Mod: 26,,, | Performed by: RADIOLOGY

## 2017-04-13 PROCEDURE — 77065 DX MAMMO INCL CAD UNI: CPT | Mod: 26,,, | Performed by: RADIOLOGY

## 2017-04-13 PROCEDURE — 77061 BREAST TOMOSYNTHESIS UNI: CPT | Mod: TC

## 2017-04-13 PROCEDURE — 76642 ULTRASOUND BREAST LIMITED: CPT | Mod: TC,RT

## 2017-04-17 DIAGNOSIS — N92.1 MENORRHAGIA WITH IRREGULAR CYCLE: Primary | ICD-10-CM

## 2017-04-27 ENCOUNTER — HOSPITAL ENCOUNTER (OUTPATIENT)
Dept: PREADMISSION TESTING | Facility: HOSPITAL | Age: 47
Discharge: HOME | End: 2017-04-27
Attending: OBSTETRICS & GYNECOLOGY
Payer: MEDICAID

## 2017-04-27 ENCOUNTER — CLINICAL SUPPORT (OUTPATIENT)
Dept: LAB | Facility: HOSPITAL | Age: 47
End: 2017-04-27
Attending: OBSTETRICS & GYNECOLOGY
Payer: MEDICAID

## 2017-04-27 VITALS
SYSTOLIC BLOOD PRESSURE: 137 MMHG | OXYGEN SATURATION: 100 % | HEART RATE: 90 BPM | DIASTOLIC BLOOD PRESSURE: 91 MMHG | RESPIRATION RATE: 16 BRPM | TEMPERATURE: 99 F

## 2017-04-27 DIAGNOSIS — Z01.818 PRE-OP TESTING: Primary | ICD-10-CM

## 2017-04-27 DIAGNOSIS — Z01.818 PRE-OP TESTING: ICD-10-CM

## 2017-04-27 PROCEDURE — 93010 ELECTROCARDIOGRAM REPORT: CPT | Mod: ,,, | Performed by: INTERNAL MEDICINE

## 2017-04-27 PROCEDURE — 93005 ELECTROCARDIOGRAM TRACING: CPT

## 2017-04-27 RX ORDER — SODIUM CHLORIDE, SODIUM LACTATE, POTASSIUM CHLORIDE, CALCIUM CHLORIDE 600; 310; 30; 20 MG/100ML; MG/100ML; MG/100ML; MG/100ML
INJECTION, SOLUTION INTRAVENOUS CONTINUOUS
Status: CANCELLED | OUTPATIENT
Start: 2017-04-27

## 2017-04-27 RX ORDER — LIDOCAINE HYDROCHLORIDE 10 MG/ML
1 INJECTION, SOLUTION EPIDURAL; INFILTRATION; INTRACAUDAL; PERINEURAL ONCE
Status: CANCELLED | OUTPATIENT
Start: 2017-04-27 | End: 2017-04-27

## 2017-04-27 RX ORDER — LISINOPRIL 10 MG/1
10 TABLET ORAL DAILY
COMMUNITY
End: 2017-04-28 | Stop reason: SDUPTHER

## 2017-04-27 NOTE — IP AVS SNAPSHOT
[facility]  [address]  [place] [address]  Phone: [phone]           Patient Discharge Instructions  Our goal is to set you up for success. This packet includes information on your condition, medications, and your home care. It will help you care for yourself to prevent having to return to the hospital.     Please ask your nurse if you have any questions.      There are many details to remember when preparing for your surgery. Here is what you will need to do, please ask your nurse if there are more specific instructions and if you have any questions:    1. Before procedure Do not smoke or drink alcoholic beverages 24 hours prior to your procedure. Do not eat or drink anything 8 hours before your procedure - this includes gum, mints, and candy.     2. Day of procedure Please remove all jewelry for the procedure. If you wear contact lenses, dentures, hearing aids or glasses, bring a container to put them in during your surgery and give to a family member.  If your doctor has scheduled you for an overnight stay, bring a small overnight bag with any personal items that you need.      3. After procedure  Make arrangements in advance for transportation home by a responsible adult. It is not safe to drive a vehicle during the 24 hours following surgery.     PLEASE NOTE: You may be contacted the day before your surgery to confirm your surgery date and arrival time. The Surgery schedule has many variables which may affect the time of your surgery case. Family members should be available if your surgery time changes.           [facility]  Unless otherwise directed by your provider, please   contact [facility], our nurse care line   that is available for 24/7 assistance.     [phone] (toll-free)     Registered nurses in the [facility]   provide: appointment scheduling, clinical advisement, health education, and other advisory services.                  ** Verify the list  of medication(s) below is accurate and up to date. Carry this with you in case of emergency. If your medications have changed, please notify your healthcare provider.             Medication List      TAKE these medications        Additional Info                      albuterol 90 mcg/actuation inhaler   Quantity:  18 g   Refills:  1   Dose:  2 puff    Instructions:  Inhale 2 puffs into the lungs every 6 (six) hours as needed for Wheezing. Rescue     Begin Date    AM    Noon    PM    Bedtime       BUTALBITAL COMPOUND -40 mg Tab   Refills:  0   Generic drug:  butalbital-aspirin-caffeine    Instructions:  Take by mouth.     Begin Date    AM    Noon    PM    Bedtime       epinephrine 0.3 mg/0.3 mL Atin   Commonly known as:  EPIPEN   Quantity:  1 Device   Refills:  0   Dose:  1 each    Instructions:  Inject 0.3 mLs (0.3 mg total) into the muscle as needed.     Begin Date    AM    Noon    PM    Bedtime       lisinopril 10 MG tablet   Refills:  0   Dose:  10 mg    Instructions:  Take 10 mg by mouth once daily.     Begin Date    AM    Noon    PM    Bedtime       methocarbamol 500 MG Tab   Commonly known as:  ROBAXIN   Refills:  0    Instructions:  TAKE 2 TABLETS BY MOUTH 4 TIMES A DAY AS NEEDED FOR MUSCLE SPASMS     Begin Date    AM    Noon    PM    Bedtime       promethazine 25 MG tablet   Commonly known as:  PHENERGAN   Refills:  0   Dose:  25 mg    Instructions:  Take 25 mg by mouth 4 (four) times daily.     Begin Date    AM    Noon    PM    Bedtime                  Please bring to all follow up appointments:    1. A copy of your discharge instructions.  2. All medicines you are currently taking in their original bottles.  3. Identification and insurance card.    Please arrive 15 minutes ahead of scheduled appointment time.    Please call 24 hours in advance if you must reschedule your appointment and/or time.        Your Scheduled Appointments     Apr 28, 2017  9:40 AM ZACHARYT   Established Patient Visit with Han  MD De   [facility]    [address], [address]  [facility] [address] [phone]              Your Future Surgeries/Procedures     May 02, 2017   Surgery with Joaquin Mendez MD   [facility] ([facility])    [address]  [facility] [id number] [phone]                  Discharge Instructions       Your surgery is scheduled for 5/2/17.    Please report to Outpatient Surgery Intake Office on the 2nd FLOOR at 11:00a.m.          INSTRUCTIONS IMPORTANT!!!  ¨ Do not eat or drink after 5 am-including water. OK to brush teeth, no   gum, candy or mints!    ¨ Take only these medicines with a small swallow of water-morning of surgery: Lisinopril        ____  Proceed to [facility] on 4/27/17 for additional blood test.        ____  Do not wear makeup, including mascara.  ____  No powder, lotions or creams to surgical area.  ____  Please remove all jewelry, including piercings and leave at home.  ____  No money or valuables needed. Please leave at home.  ____  Please bring any documents given by your doctor.  ____  If going home the same day, arrange for a ride home. You will not be able to             drive if Anesthesia was used.  ____  Wear loose fitting clothing. Allow for dressings, bandages.  ____  Stop Aspirin, Ibuprofen, Motrin and Aleve at least 3-5 days before surgery, unless otherwise instructed by your doctor, or the nurse.   You MAY use Tylenol/acetaminophen until day of surgery.  ____  If you take diabetic medication, do not take am of surgery unless instructed by Doctor.  ____  Call MD for temperature above 101 degrees.  ____ Stop taking any Fish Oil supplement or any Vitamins that contain Vitamin E at least 5 days prior to surgery.  ____ Do Not wear your contact lenses the day of your procedure.  You may wear your glasses.        I have read or had read and explained to me, and understand  the above information.  Additional comments or instructions:  For additional questions call [phone]       Gargle with Listerine twice a day for 2 days prior to surgery, including the morning of surgery.        Endometrial Ablation  Endometrial ablation is an outpatient surgery that can reduce or stop heavy menstrual bleeding. Ablation destroys the lining of the uterus. This surgery is for women who do not want to have any more children and who have not yet entered menopause. It should not be used by women with endometrial hyperplasia or cancer of the uterus.  Treatment takes less than an hour, and you can go home later that day.  Preparing for surgery  · You may be given medicine by mouth or injection for a few weeks or months before your ablation. This thins the lining of the uterus and reduces bleeding.  · Your health care provider may recommend other procedures to check the inside of your uterus before the ablation is done.   · The day before surgery, you may be given medicine or a special substance (laminaria) may be put into your cervix (the opening to the uterus). This widens the opening.  · To help prevent problems with anesthesia, do not eat or drink anything 10 hours before surgery.  Your surgery     Destroying the lining with heat, freezing, or electric current prevents the lining from growing back.      · Youll be given anesthesia so you stay comfortable and relaxed and feel no pain during surgery.  · Then, your uterus may be filled with fluid. This puts pressure on the lining to help reduce bleeding. It also allows your health care provider to see inside your uterus.  · Next your health care provider puts a small telescope-like instrument through the cervix. This scope may be connected to a video monitor. This helps your health care provider see and control the ablation process. At the end of the scope, a device using heat, freezing, or electric current destroys the uterine lining. Instead of the  scope, your health care provider may use a device that both expands and destroys the uterine lining. After being inserted into your uterus, it also uses heat or other energy to destroy the lining. Your health care provider will choose the device thats best for you.  Your recovery  · You may have cramping or aching in your abdomen after surgery. Your health care provider can give you pain medicine.  · You may also have a bloody or watery discharge or bleeding for days or weeks. Use sanitary pads, not tampons.  · Dont have sexual intercourse or play active sports for 2 weeks after surgery.  · You can likely return to work in 2 days.  · Ask your health care provider about using contraception after an ablation.  · Your health care provider will see you in about 6 weeks to be sure youre healing well.  Call your health care provider if you have any of the following after surgery:  · Persistent or increased abdominal pain  · Shortness of breath  · Heavy vaginal bleeding  · Fever over 100.4°F (38°C) or chills  · Nausea  · Frequent urination for 24 hours   Date Last Reviewed: 5/10/2015  © 9504-0533 [employer]. [address] [place] [address]. All rights reserved. This information is not intended as a substitute for professional medical care. Always follow your healthcare professional's instructions.      Anesthesia: General Anesthesia  Youre due to have surgery. During surgery, youll be given medication called anesthesia. (It is also called anesthetic.) This will keep you comfortable and pain-free. Your anesthesia provider will use general anesthesia. This sheet tells you more about it.  What is general anesthesia?     You are watched continuously during your procedure by the anesthesia provider   General anesthesia puts you into a state like deep sleep. It goes into the bloodstream (IV anesthetics), into the lungs (gas anesthetics), or both. You feel nothing during the procedure. You will not  remember it. During the procedure, the anesthesia provider monitors you continuously. He or she checks your heart rate and rhythm, blood pressure, breathing, and blood oxygen.  · IV Anesthetics. IV anesthetics are given through an IV line in your arm. Theyre often given first. This is so you are asleep before a gas anesthetic is started. Some kinds of IV anesthetics relieve pain. Others relax you. Your doctor will decide which kind is best in your case.  · Gas Anesthetics. Gas anesthetics are breathed into the lungs. They are often used to keep you asleep. They can be given through a facemask or a tube placed in your larynx or trachea (breathing tube).  ¨ If you have a facemask, your anesthesia provider will most likely place it over your nose and mouth while youre still awake. Youll breathe oxygen through the mask as your IV anesthetic is started. Gas anesthetic may be added through the mask.  ¨ If you have a tube in the larynx or trachea, it will be inserted into your throat after youre asleep.  Anesthesia tools and medications  You will likely have:  · IV anesthetics. These are put into an IV line into your bloodstream.  · Gas anesthetics. You breathe these anesthetics into your lungs, where they pass into your bloodstream.  · Pulse oximeter. This is a small clip that is attached to the end of your finger. This measures your blood oxygen level.  · Electrocardiography leads (electrodes). These are small sticky pads that are placed on your chest. They record your heart rate and rhythm.  · Blood pressure cuff. This reads your blood pressure.  Risks and possible complications  General anesthesia has some risks. These include:  · Breathing problems  · Nausea and vomiting  · Sore throat or hoarseness (usually temporary)  · Allergic reaction to the anesthetic  · Irregular heartbeat (rare)  · Cardiac arrest (rare)   Anesthesia safety  · Follow all instructions you are given for how long not to eat or drink before  your procedure.  · Be sure your doctor knows what medications and drugs you take. This includes over-the-counter medications, herbs, supplements, alcohol or other drugs. You will be asked when those were last taken.  · Have an adult family member or friend drive you home after the procedure.  · For the first 24 hours after your surgery:  ¨ Do not drive or use heavy equipment.  ¨ Have a trusted family member or spouse make important decisions or sign documents.  ¨ Avoid alcohol.  ¨ Have a responsible adult stay with you. He or she can watch for problems and help keep you safe.  Date Last Reviewed: 10/16/2014  © 0990-8845 [employer]. [address] [place] [address], [place] [address]. All rights reserved. This information is not intended as a substitute for professional medical care. Always follow your healthcare professional's instructions.              Admission Information     Date & Time Provider Department CSN    4/27/2017 10:00 AM Joaquin Mendez MD [facility] [id number]      Care Providers     Provider Role Specialty Primary office phone    Joaquin Mendez MD Attending Provider Obstetrics and Gynecology [phone]      Your Vitals Were     Temp Last Period                99.1 °F (37.3 °C) (Oral) 04/05/2017 (Approximate)          Recent Lab Values     No lab values to display.      Allergies as of 4/27/2017        Reactions    Lisinopril Swelling      Advance Directives     An advance directive is a document which, in the event you are no longer able to make decisions for yourself, tells your healthcare team what kind of treatment you do or do not want to receive, or who you would like to make those decisions for you.  If you do not currently have an advance directive, [facility] encourages you to create one.  For more information call:  [phone]), [phone] ([phone]),  or log on to [email or web address].        Language Assistance Services     ATTENTION:  Language assistance services are available, free of charge. Please call [phone].      ATENCIÓN: Si habla dino, tiene a palma disposición servicios gratuitos de asistencia lingüística. Llame al [phone].     CHÚ Ý: N?u b?n nói Ti?ng Vi?t, có các d?ch v? h? tr? ngôn ng? mi?n phí dành cho b?n. G?i s? [phone].         [facility] complies with applicable Federal civil rights laws and does not discriminate on the basis of race, color, national origin, age, disability, or sex.

## 2017-04-27 NOTE — DISCHARGE INSTRUCTIONS
Your surgery is scheduled for 5/2/17.    Please report to Outpatient Surgery Intake Office on the 2nd FLOOR at 11:00a.m.          INSTRUCTIONS IMPORTANT!!!  ¨ Do not eat or drink after 5 am-including water. OK to brush teeth, no   gum, candy or mints!    ¨ Take only these medicines with a small swallow of water-morning of surgery: Lisinopril        ____  Proceed to [facility] on 4/27/17 for additional blood test.        ____  Do not wear makeup, including mascara.  ____  No powder, lotions or creams to surgical area.  ____  Please remove all jewelry, including piercings and leave at home.  ____  No money or valuables needed. Please leave at home.  ____  Please bring any documents given by your doctor.  ____  If going home the same day, arrange for a ride home. You will not be able to             drive if Anesthesia was used.  ____  Wear loose fitting clothing. Allow for dressings, bandages.  ____  Stop Aspirin, Ibuprofen, Motrin and Aleve at least 3-5 days before surgery, unless otherwise instructed by your doctor, or the nurse.   You MAY use Tylenol/acetaminophen until day of surgery.  ____  If you take diabetic medication, do not take am of surgery unless instructed by Doctor.  ____  Call MD for temperature above 101 degrees.  ____ Stop taking any Fish Oil supplement or any Vitamins that contain Vitamin E at least 5 days prior to surgery.  ____ Do Not wear your contact lenses the day of your procedure.  You may wear your glasses.        I have read or had read and explained to me, and understand the above information.  Additional comments or instructions:  For additional questions call [phone]       Gargle with Listerine twice a day for 2 days prior to surgery, including the morning of surgery.        Endometrial Ablation  Endometrial ablation is an outpatient surgery that can reduce or stop heavy menstrual bleeding. Ablation destroys the lining of the uterus. This surgery is for women who do not  want to have any more children and who have not yet entered menopause. It should not be used by women with endometrial hyperplasia or cancer of the uterus.  Treatment takes less than an hour, and you can go home later that day.  Preparing for surgery  · You may be given medicine by mouth or injection for a few weeks or months before your ablation. This thins the lining of the uterus and reduces bleeding.  · Your health care provider may recommend other procedures to check the inside of your uterus before the ablation is done.   · The day before surgery, you may be given medicine or a special substance (laminaria) may be put into your cervix (the opening to the uterus). This widens the opening.  · To help prevent problems with anesthesia, do not eat or drink anything 10 hours before surgery.  Your surgery     Destroying the lining with heat, freezing, or electric current prevents the lining from growing back.      · Youll be given anesthesia so you stay comfortable and relaxed and feel no pain during surgery.  · Then, your uterus may be filled with fluid. This puts pressure on the lining to help reduce bleeding. It also allows your health care provider to see inside your uterus.  · Next your health care provider puts a small telescope-like instrument through the cervix. This scope may be connected to a video monitor. This helps your health care provider see and control the ablation process. At the end of the scope, a device using heat, freezing, or electric current destroys the uterine lining. Instead of the scope, your health care provider may use a device that both expands and destroys the uterine lining. After being inserted into your uterus, it also uses heat or other energy to destroy the lining. Your health care provider will choose the device thats best for you.  Your recovery  · You may have cramping or aching in your abdomen after surgery. Your health care provider can give you pain medicine.  · You may  also have a bloody or watery discharge or bleeding for days or weeks. Use sanitary pads, not tampons.  · Dont have sexual intercourse or play active sports for 2 weeks after surgery.  · You can likely return to work in 2 days.  · Ask your health care provider about using contraception after an ablation.  · Your health care provider will see you in about 6 weeks to be sure youre healing well.  Call your health care provider if you have any of the following after surgery:  · Persistent or increased abdominal pain  · Shortness of breath  · Heavy vaginal bleeding  · Fever over 100.4°F (38°C) or chills  · Nausea  · Frequent urination for 24 hours   Date Last Reviewed: 5/10/2015  © 9075-7817 [employer]. [address] [place] [address], [place] [address]. All rights reserved. This information is not intended as a substitute for professional medical care. Always follow your healthcare professional's instructions.      Anesthesia: General Anesthesia  Youre due to have surgery. During surgery, youll be given medication called anesthesia. (It is also called anesthetic.) This will keep you comfortable and pain-free. Your anesthesia provider will use general anesthesia. This sheet tells you more about it.  What is general anesthesia?     You are watched continuously during your procedure by the anesthesia provider   General anesthesia puts you into a state like deep sleep. It goes into the bloodstream (IV anesthetics), into the lungs (gas anesthetics), or both. You feel nothing during the procedure. You will not remember it. During the procedure, the anesthesia provider monitors you continuously. He or she checks your heart rate and rhythm, blood pressure, breathing, and blood oxygen.  · IV Anesthetics. IV anesthetics are given through an IV line in your arm. Theyre often given first. This is so you are asleep before a gas anesthetic is started. Some kinds of IV anesthetics relieve pain. Others relax you. Your doctor  will decide which kind is best in your case.  · Gas Anesthetics. Gas anesthetics are breathed into the lungs. They are often used to keep you asleep. They can be given through a facemask or a tube placed in your larynx or trachea (breathing tube).  ¨ If you have a facemask, your anesthesia provider will most likely place it over your nose and mouth while youre still awake. Youll breathe oxygen through the mask as your IV anesthetic is started. Gas anesthetic may be added through the mask.  ¨ If you have a tube in the larynx or trachea, it will be inserted into your throat after youre asleep.  Anesthesia tools and medications  You will likely have:  · IV anesthetics. These are put into an IV line into your bloodstream.  · Gas anesthetics. You breathe these anesthetics into your lungs, where they pass into your bloodstream.  · Pulse oximeter. This is a small clip that is attached to the end of your finger. This measures your blood oxygen level.  · Electrocardiography leads (electrodes). These are small sticky pads that are placed on your chest. They record your heart rate and rhythm.  · Blood pressure cuff. This reads your blood pressure.  Risks and possible complications  General anesthesia has some risks. These include:  · Breathing problems  · Nausea and vomiting  · Sore throat or hoarseness (usually temporary)  · Allergic reaction to the anesthetic  · Irregular heartbeat (rare)  · Cardiac arrest (rare)   Anesthesia safety  · Follow all instructions you are given for how long not to eat or drink before your procedure.  · Be sure your doctor knows what medications and drugs you take. This includes over-the-counter medications, herbs, supplements, alcohol or other drugs. You will be asked when those were last taken.  · Have an adult family member or friend drive you home after the procedure.  · For the first 24 hours after your surgery:  ¨ Do not drive or use heavy equipment.  ¨ Have a trusted family member or  spouse make important decisions or sign documents.  ¨ Avoid alcohol.  ¨ Have a responsible adult stay with you. He or she can watch for problems and help keep you safe.  Date Last Reviewed: 10/16/2014  © 1251-0045 [employer]. [address] [place] [address], [place] [address]. All rights reserved. This information is not intended as a substitute for professional medical care. Always follow your healthcare professional's instructions.

## 2017-04-28 ENCOUNTER — OFFICE VISIT (OUTPATIENT)
Dept: FAMILY MEDICINE | Facility: HOSPITAL | Age: 47
End: 2017-04-28
Attending: FAMILY MEDICINE
Payer: MEDICAID

## 2017-04-28 VITALS
DIASTOLIC BLOOD PRESSURE: 90 MMHG | SYSTOLIC BLOOD PRESSURE: 139 MMHG | BODY MASS INDEX: 32.03 KG/M2 | TEMPERATURE: 97 F | HEART RATE: 94 BPM | WEIGHT: 180.75 LBS | HEIGHT: 63 IN

## 2017-04-28 DIAGNOSIS — I10 ESSENTIAL HYPERTENSION: ICD-10-CM

## 2017-04-28 DIAGNOSIS — J35.1 SWOLLEN TONSIL: Primary | ICD-10-CM

## 2017-04-28 PROCEDURE — 99214 OFFICE O/P EST MOD 30 MIN: CPT | Performed by: FAMILY MEDICINE

## 2017-04-28 RX ORDER — LISINOPRIL 10 MG/1
10 TABLET ORAL DAILY
Qty: 30 TABLET | Refills: 0 | Status: SHIPPED | OUTPATIENT
Start: 2017-04-28 | End: 2017-08-11

## 2017-04-28 RX ORDER — NAPROXEN 500 MG/1
500 TABLET ORAL 2 TIMES DAILY
Qty: 30 TABLET | Refills: 0 | Status: SHIPPED | OUTPATIENT
Start: 2017-04-28 | End: 2017-08-01

## 2017-04-28 NOTE — PROGRESS NOTES
Subjective:       Patient ID: Lorelei Norris is a 47 y.o. female.    Chief Complaint: Sore Throat (tonsils)    HPI   Patient is a 47 year female with a history of HTN and tonsillitis.  Patient was seen about 1 month ago for referral to ENT, throat cultures were obtained which were negative for infectious process.  Patient reports since last visit she was unable to get in with ENT due to not taking new patients or providers not participating with patient's insurance.  She was also evaluated by Dr. Mendez for heavy menstrual bleeding and is scheduled for an ablation on Tuesday.  Patient saw anesthesia NP for evaluation of airway for intubation for anesthesia.  Per telephone conversation with anesthesia nurse Lorelei with [facility] patient likely difficult intubation, and anesthesia not comfortable with intubation for anesthesia.    Patient states she persists to have trouble/pain with swallowing.  Difficulty breathing upon lying down, and persistent nausea.  Denies any shortness of breath, fever/chills.      Review of Systems   Constitutional: Positive for appetite change. Negative for diaphoresis, fatigue and fever.   HENT: Negative for congestion.         Enlarged tonsils   Eyes: Negative for discharge and visual disturbance.   Respiratory: Negative for cough, shortness of breath and wheezing.    Cardiovascular: Negative for chest pain, palpitations and leg swelling.   Gastrointestinal: Positive for nausea. Negative for abdominal distention, abdominal pain, constipation, diarrhea and vomiting.        Dysphagia   Endocrine: Negative for cold intolerance and heat intolerance.   Genitourinary: Negative for dysuria and flank pain.   Musculoskeletal: Negative for arthralgias and myalgias.   Skin: Negative for color change, pallor and rash.   Neurological: Negative for weakness, numbness and headaches.   Hematological: Negative for adenopathy.   Psychiatric/Behavioral: Negative for agitation, behavioral problems and  hallucinations. The patient is not nervous/anxious.          Objective:      Vitals:    04/28/17 0958   BP: (!) 139/90   Pulse: 94   Temp: 97.3 °F (36.3 °C)     Physical Exam   Constitutional: She is oriented to person, place, and time. She appears well-developed and well-nourished.   HENT:   Head: Normocephalic and atraumatic.   Enlarged erythematous tonsils without exudates, no signs of infection  Tender to palpation     Eyes: EOM are normal. Pupils are equal, round, and reactive to light.   Neck: Normal range of motion.   Cardiovascular: Normal rate, regular rhythm and normal heart sounds.  Exam reveals no gallop and no friction rub.    No murmur heard.  Pulmonary/Chest: Effort normal and breath sounds normal. No respiratory distress. She has no wheezes.   Abdominal: Soft. Bowel sounds are normal. She exhibits no distension. There is no tenderness.   Musculoskeletal: Normal range of motion. She exhibits no edema.   Neurological: She is alert and oriented to person, place, and time.   Psychiatric: She has a normal mood and affect. Her behavior is normal. Thought content normal.       Assessment:       1. Swollen tonsil    2. Essential hypertension        Plan:       Swollen tonsil  -     naproxen (NAPROSYN) 500 MG tablet; Take 1 tablet (500 mg total) by mouth 2 (two) times daily.  Dispense: 30 tablet; Refill: 0  -     DM-benzocaine-menthol (CHLORASEPTIC TOTAL) 5-6-10 mg Lozg; Take 10 mg by mouth 2 (two) times daily.  Dispense: 15 lozenge; Refill: 0  -     Referrals placed at [facility] for ENT and OMFS.  Advised patient to schedule appointment with earliest available appointment for tonsillectomy with biopsy of enlarged tonsils.  Will follow up with referrals in 1-2 weeks to expedite for hysteroscopy per Dr. Mendez.    Essential hypertension  -     lisinopril 10 MG tablet; Take 1 tablet (10 mg total) by mouth once daily.  Dispense: 30 tablet; Refill: 0    Abnormal Uterine Bleeding:        -    Pending  ablation/hysteroscopy per Dr. Mendez    Return in about 1 month (around 5/28/2017).      Patient discussed with staff.    Han Kc MD  [facility],  2  4/28/2017  3:34 PM

## 2017-07-12 ENCOUNTER — TELEPHONE (OUTPATIENT)
Dept: OBSTETRICS AND GYNECOLOGY | Facility: CLINIC | Age: 47
End: 2017-07-12

## 2017-07-12 NOTE — TELEPHONE ENCOUNTER
----- Message from Jazz An sent at 7/12/2017  9:06 AM CDT -----  Contact: [phone]  Patient is requesting to speak with you regarding r/s her surgery

## 2017-08-01 ENCOUNTER — OFFICE VISIT (OUTPATIENT)
Dept: OBSTETRICS AND GYNECOLOGY | Facility: CLINIC | Age: 47
End: 2017-08-01
Payer: MEDICAID

## 2017-08-01 VITALS
DIASTOLIC BLOOD PRESSURE: 64 MMHG | HEIGHT: 63 IN | SYSTOLIC BLOOD PRESSURE: 120 MMHG | BODY MASS INDEX: 32.15 KG/M2 | WEIGHT: 181.44 LBS

## 2017-08-01 DIAGNOSIS — N84.0 ENDOMETRIAL POLYP: ICD-10-CM

## 2017-08-01 DIAGNOSIS — N92.1 MENORRHAGIA WITH IRREGULAR CYCLE: Primary | ICD-10-CM

## 2017-08-01 PROCEDURE — 99999 PR PBB SHADOW E&M-EST. PATIENT-LVL II: CPT | Mod: PBBFAC,,, | Performed by: OBSTETRICS & GYNECOLOGY

## 2017-08-01 PROCEDURE — 99214 OFFICE O/P EST MOD 30 MIN: CPT | Mod: S$PBB,,, | Performed by: OBSTETRICS & GYNECOLOGY

## 2017-08-01 PROCEDURE — 99212 OFFICE O/P EST SF 10 MIN: CPT | Mod: PBBFAC,PO | Performed by: OBSTETRICS & GYNECOLOGY

## 2017-08-01 NOTE — PROGRESS NOTES
ADMISSION H&P  CC:menorhagia  Chief Complaint   Patient presents with    Follow-up       HPI:  47 y.o.  presents for pre-op H&P for menorhagia due to endometrial polyp and desires abalation. Was previously scheduled but procedure cancelled due to chronic tonsillitis. Pt has had her tonsils removed and now wants vb stopped No LMP recorded..    Past Medical History:   Diagnosis Date    Abnormal Pap smear of cervix     5 years ago    Hypertension     Obstructive tonsils and adenoids 2017     Past Surgical History:   Procedure Laterality Date    TONSILLECTOMY      TUBAL LIGATION       History reviewed. No pertinent family history.  Review of patient's allergies indicates:  No Known Allergies    Current Outpatient Prescriptions:     albuterol 90 mcg/actuation inhaler, Inhale 2 puffs into the lungs every 6 (six) hours as needed for Wheezing. Rescue, Disp: 18 g, Rfl: 1    DM-benzocaine-menthol (CHLORASEPTIC TOTAL) 5-6-10 mg Lozg, Take 10 mg by mouth 2 (two) times daily., Disp: 15 lozenge, Rfl: 0    epinephrine (EPIPEN) 0.3 mg/0.3 mL AtIn, Inject 0.3 mLs (0.3 mg total) into the muscle as needed., Disp: 1 Device, Rfl: 0    lisinopril 10 MG tablet, Take 1 tablet (10 mg total) by mouth once daily., Disp: 30 tablet, Rfl: 0  Social History     Social History    Marital status:      Spouse name: N/A    Number of children: N/A    Years of education: N/A     Occupational History    Not on file.     Social History Main Topics    Smoking status: Never Smoker    Smokeless tobacco: Not on file    Alcohol use No    Drug use: No    Sexual activity: Yes     Partners: Male     Birth control/ protection: None     Other Topics Concern    Not on file     Social History Narrative    No narrative on file         Last pap Nml  Last pathology SPECIMEN  1) Endometrial biopsy.  FINAL PATHOLOGIC DIAGNOSIS  ENDOMETRIAL BIOPSY:  PARTIALLY INACTIVE PROLIFERATIVE ENDOMETRIUM WITH NO EVIDENCE OF CARCINOMA  OR  HYPERPLASIA  Diagnosed by: Hector Kessler M.D.  (Electronically Signed: 2017-04-19 16:51:05)    Last ultrasound      Indication  ========  menorrhagia.    Method  ======  Transvaginal ultrasound examination.    Uterus  ======  Uterus: Normal  Endometrium: clearly visualized  Uterus long 9.0 cm  Uterus ap 5.1 cm  Uterus tr 4.9 cm  Uterus vol 115.0 cmÂ³  Endometrial thickness, total 11.3 mm  Polyps: Polyps identified  D1 7.0 mm  D2 5.0 mm  D3 6.0 mm  Mean 6.0 mm    Right Ovary  =========  Rt ovary: Visualized  Rt ovary D1 3.6 cm  Rt ovary D2 2.6 cm  Rt ovary D3 1.7 cm  Rt ovary mean 2.6 cm  Rt ovary vol 8.0 cmÂ³  Rt ovarian follicle(s): Follicles identified  D1 16.0 mm  D2 12.0 mm  D3 12.0 mm  Mean 13.3 mm  Vol 1.206 cmÂ³    Left Ovary  ========  Lt ovary: Visualized  Lt ovary D1 2.7 cm  Lt ovary D2 2.2 cm  Lt ovary D3 2.0 cm  Lt ovary mean 2.3 cm  Lt ovary vol 6.3 cmÂ³    Sonographer Comment  ==================  thick endometrium with polyp.    Impression  =========  thick endometrium with polyp  has chronic tonsillitis and aneshtesia will not do case until tonsils fixed.                                                      Sonographer: Day Freeman  Electronically Signed by: Joaquin Barrera M.D. at 05/02/2017-13:28       Vitals:    08/01/17 1406   BP: 120/64     General Appearance: Alert, appropriate appearance for age. No acute distress, Chest/Respiratory Exam: Normal.   Cardiovascular Exam: regular rate and rhythm   Gastrointestinal Exam: soft, non-tender; bowel sounds normal; no masses,  no organomegaly  Pelvic Exam Female: Exam deferred.   Psychiatric Exam: Alert and oriented, appropriate affect.    Assessment:   Chief Complaint   Patient presents with    Follow-up       Plan: D&C Hyst and Polypectomy with Camilo clear and isauro ablation on 8/15/17  I have discussed the risks, benefits, indications, and alternatives of the procedure in detail.  The patient verbalizes her understanding.  All questions  answered.  Consents signed.  The patient agrees to proceed to proceed as planned.  Pt will preop at a later time

## 2017-08-02 ENCOUNTER — TELEPHONE (OUTPATIENT)
Dept: OBSTETRICS AND GYNECOLOGY | Facility: CLINIC | Age: 47
End: 2017-08-02

## 2017-08-02 DIAGNOSIS — N92.0 MENORRHAGIA WITH REGULAR CYCLE: Primary | ICD-10-CM

## 2017-08-09 ENCOUNTER — ANESTHESIA EVENT (OUTPATIENT)
Dept: SURGERY | Facility: HOSPITAL | Age: 47
End: 2017-08-09
Payer: MEDICAID

## 2017-08-09 ENCOUNTER — HOSPITAL ENCOUNTER (OUTPATIENT)
Dept: PREADMISSION TESTING | Facility: HOSPITAL | Age: 47
Discharge: HOME | End: 2017-08-09
Attending: OBSTETRICS & GYNECOLOGY
Payer: MEDICAID

## 2017-08-09 VITALS
OXYGEN SATURATION: 99 % | SYSTOLIC BLOOD PRESSURE: 141 MMHG | HEART RATE: 88 BPM | WEIGHT: 184.94 LBS | TEMPERATURE: 99 F | HEIGHT: 63 IN | RESPIRATION RATE: 20 BRPM | BODY MASS INDEX: 32.77 KG/M2 | DIASTOLIC BLOOD PRESSURE: 91 MMHG

## 2017-08-09 DIAGNOSIS — R87.619 ABNORMAL CERVICAL PAPANICOLAOU SMEAR, UNSPECIFIED ABNORMAL PAP FINDING: ICD-10-CM

## 2017-08-09 PROBLEM — J35.1 SWOLLEN TONSIL: Status: RESOLVED | Noted: 2017-04-28 | Resolved: 2017-08-09

## 2017-08-09 RX ORDER — LIDOCAINE HYDROCHLORIDE 10 MG/ML
1 INJECTION, SOLUTION EPIDURAL; INFILTRATION; INTRACAUDAL; PERINEURAL ONCE
Status: CANCELLED | OUTPATIENT
Start: 2017-08-09 | End: 2017-08-09

## 2017-08-09 RX ORDER — SODIUM CHLORIDE, SODIUM LACTATE, POTASSIUM CHLORIDE, CALCIUM CHLORIDE 600; 310; 30; 20 MG/100ML; MG/100ML; MG/100ML; MG/100ML
INJECTION, SOLUTION INTRAVENOUS CONTINUOUS
Status: CANCELLED | OUTPATIENT
Start: 2017-08-09

## 2017-08-09 NOTE — DISCHARGE INSTRUCTIONS
Hysteroscopy    Hysteroscopy is a procedure that is done to see inside your uterus. It can help find the cause of problems in the uterus. This helps your health care provider decide on the best treatment. In some cases, it can be used to perform treatment. Hysteroscopy may be done in your health care provider's office or in the hospital.  Why might I need hysteroscopy?  Hysteroscopy may be done based on the results of other tests. It can help find the cause of problems. These can include:  · Unusually heavy or long menstrual periods  · Bleeding between periods  · Postmenopausal bleeding  · Trouble becoming pregnant (infertility) or carrying a pregnancy to term  · To locate an intrauterine device (IUD)  · To perform sterilization  What are the risks and complications of hysteroscopy?  Problems with the procedure are rare. But all procedures have risks. Risks of hysteroscopy include:  · Infection  · Bleeding  · Tearing of the uterine wall  · Damage to internal organs  · Scarring of the uterus  · Fluid overload  · Problems with anesthesia (the medication that prevents pain during the procedure)  How do I get ready for hysteroscopy?  · Tell your health care provider if you have any health problems. These include diabetes, heart disease, or bleeding problems.  · Tell your health care provider about all the medicines you take. This includes any over-the-counter medications, herbs, or supplements.  · You may be told not to use vaginal creams or medication. And you may be told not to have sex or douche.  · You may be told not to eat or drink the night before the procedure.  · You may be tested for pregnancy and infection.  · You may be asked to sign a consent form.  · You may be given a pain reliever to take an hour before the procedure. This helps relieve cramping that may occur.  What happens during a hysteroscopy?  · Youll lie on an exam table with your feet in stirrups.  · You may be given general anesthesia or  medicines to help you relax or sleep. In some cases, an IV line will be put into a vein in your arm or hand. This line is then used to give fluids and medicines.  · A tool called a speculum is inserted into the vagina to hold it open. A tool called a dilator may be used to widen the cervix.  · Numbing medicine may be applied to the cervix.  · The hysteroscope (a long, thin lighted tube) is inserted through the vagina and into the uterus. It is used to see inside the uterus. Images of the uterus are viewed on a monitor.  · A gas or fluid may be injected into the uterus to expand it.  · Other tools may be put through the hysteroscope. These are used to take tissue samples, remove growths, or place implants for the purpose of sterilization.  What happens after hysteroscopy?  · You may have cramps and bleeding for 24 hours after the procedure. This is normal. Use pads instead of tampons.  · Do not douche or use tampons until your health care provider says its OK.  · Do not use any vaginal medicines until you are told its OK.  · Ask your health care provider when its OK to have sex again.  When should I call my health care provider?  Call your health care provider if you have:  · Heavy bleeding (more than 1 pad an hour for 2 or more hours)  · A fever over 100.4°F (38.0°C)  · Increasing abdominal pain or tenderness  · Foul-smelling discharge   Follow-up care  Schedule a follow-up visit with your health care provider. Based on the results of your test, you may need more treatment. Be sure to follow instructions and keep your appointments.  Date Last Reviewed: 5/12/2015 © 2000-2016 [employer]. [address] [place] [address]. All rights reserved. This information is not intended as a substitute for professional medical care. Always follow your healthcare professional's instructions.    Discharge Instructions for Dilation and Curettage (D and C)  Your doctor performed dilation and curettage  (D&C). The reasons for having this procedure vary from person to person. The D&C may be done to control heavy uterine bleeding, to find the cause of irregular bleeding, to perform an , or to remove pregnancy tissue if you have had a miscarriage.  Home care  · Take it easy. Rest for 2 days as needed.  · Return to your normal activities after 24 to 48 hours. You may also return to work at that time.  · Eat a normal diet.  · Take an over-the-counter pain reliever for pain, if needed.  · Remember, its OK to have bleeding for about a week after the procedure. The amount of bleeding should be similar to what you have during a normal period.  · Dont drive for 24 hours after the procedure unless specifically told by your provider that it is OK to do so.  · Dont have sexual intercourse or use tampons or douches until your doctor says its safe to do so.  Follow-up  · Make a follow-up appointment as directed by our staff.     When to call your doctor  Call your doctor right away if you have any of the following:  · Bleeding that soaks more than one sanitary pad in one hour  · Severe abdominal pain  · Severe cramps  · Fever above 100.4°F (38.0°C)  · A foul smelling vaginal discharge   Date Last Reviewed: 2015  © 8386-4067 [employer]. [address] [place] [address], [place] [address]. All rights reserved. This information is not intended as a substitute for professional medical care. Always follow your healthcare professional's instructions.  Your surgery is scheduled for 8/15/2017    Please report to Outpatient Surgery Intake Office on the 2nd FLOOR at 0900          INSTRUCTIONS IMPORTANT!!!  ¨ Do not eat or drink after 12 midnight-including water. OK to brush teeth, no   gum, candy or mints!    ¨ Take only these medicines with a small swallow of water-morning of surgery.  ***      ____  Proceed to [facility] on today for additional blood test.    ____  Do not wear makeup, including  abdoulaye.  ____  No powder, lotions or creams to surgical area.  ____  Please remove all jewelry, including piercings and leave at home.  ____  No money or valuables needed. Please leave at home.  ____  Please bring any documents given by your doctor.  ____  If going home the same day, arrange for a ride home. You will not be able to   drive if Anesthesia was used.  ____  Children under 18 years require a parent / guardian present the entire time   they are in surgery / recovery.  ____  Wear loose fitting clothing. Allow for dressings, bandages.  ____  Wash the surgical area with Hibiclens the night before surgery, and again the             morning of surgery.  Be sure to rinse hibiclens off completely (if instructed by nurse).  ____  If you take diabetic medication, do not take am of surgery unless instructed by   Doctor.  ____  Call MD for temperature above 101 degrees.  ____Do not wear contacts the day of the procedure. You may wear your glasses.          I have read or had read and explained to me, and understand the above information.  Additional comments or instructions:

## 2017-08-09 NOTE — ANESTHESIA PREPROCEDURE EVALUATION
08/09/2017     Lorelei Norris is a 47 y.o., female is scheduled for hysteroscopic D&C and endometrial ablation with polypectomy under [facility] on 8/15/2017.    Past Surgical History:   Procedure Laterality Date    TONSILLECTOMY, ADENOIDECTOMY  06/2017    TUBAL LIGATION         Anesthesia Evaluation    I have reviewed the Patient Summary Reports.    I have reviewed the Nursing Notes.   I have reviewed the Medications.     Review of Systems  Anesthesia Hx:  No problems with previous Anesthesia  History of prior surgery of interest to airway management or planning: Previous anesthesia: General  Denies Personal Hx of Anesthesia complications.   Social:  Non-Smoker, No Alcohol Use    Hematology/Oncology:  Hematology Normal        EENT/Dental:   S/p tonsillectomy 2017 Throat Symptoms (s/p TA with mildly sore throat)   Cardiovascular:   Exercise tolerance: good Hypertension, well controlled Denies Dysrhythmias.   Denies Angina.     ECG has been reviewed.    Pulmonary:   Denies Shortness of breath.    Renal/:  Renal/ Normal     Hepatic/GI:  Hepatic/GI Normal    OB/GYN/PEDS:  Heavy menstrual bleeding   Neurological:  Neurology Normal    Endocrine:  Endocrine Normal        Physical Exam  General:  Obesity    Airway/Jaw/Neck:  Airway Findings: Mouth Opening: Small, but > 3cm Tongue: Normal  General Airway Assessment: Adult  Oropharynx Findings: Normal Mallampati: III  TM Distance: Normal, at least 6 cm        Eyes/Ears/Nose:  EYES/EARS/NOSE FINDINGS: Normal   Dental:  Dental Findings: In tact, Periodontal disease, Mild   Chest/Lungs:  Chest/Lungs Clear    Heart/Vascular:  Heart Findings: Normal Heart murmur: negative    Abdomen:  Abdomen Findings: Normal      Mental Status:  Mental Status Findings: Normal      EKG 4/27/2017  Normal sinus rhythm  Normal ECG  No previous ECGs available  Confirmed by Osiel Mcclain MD ([id number])  on 4/27/2017 2:45:47 PM    Anesthesia Plan  Type of Anesthesia, risks & benefits discussed:  Anesthesia Type:  general  Patient's Preference:   Intra-op Monitoring Plan:   Intra-op Monitoring Plan Comments:   Post Op Pain Control Plan:   Post Op Pain Control Plan Comments:   Induction:   IV  Beta Blocker:  Patient is not currently on a Beta-Blocker (No further documentation required).       Informed Consent: Patient understands risks and agrees with Anesthesia plan.  Questions answered.   ASA Score: 2     Day of Surgery Review of History & Physical:        Anesthesia Plan Notes: Anesthesia consent will be obtained prior to procedure on 8/15/2017.    Pt is s/p TA in June 2017; pt with mildly residual sore throat        Ready For Surgery From Anesthesia Perspective.

## 2017-08-11 ENCOUNTER — HOSPITAL ENCOUNTER (EMERGENCY)
Facility: HOSPITAL | Age: 47
Discharge: HOME | End: 2017-08-11
Attending: EMERGENCY MEDICINE
Payer: MEDICAID

## 2017-08-11 VITALS
DIASTOLIC BLOOD PRESSURE: 71 MMHG | OXYGEN SATURATION: 100 % | WEIGHT: 183 LBS | TEMPERATURE: 99 F | BODY MASS INDEX: 32.43 KG/M2 | SYSTOLIC BLOOD PRESSURE: 123 MMHG | HEART RATE: 99 BPM | RESPIRATION RATE: 18 BRPM | HEIGHT: 63 IN

## 2017-08-11 DIAGNOSIS — R31.9 URINARY TRACT INFECTION WITH HEMATURIA, SITE UNSPECIFIED: ICD-10-CM

## 2017-08-11 DIAGNOSIS — R05.9 COUGH: ICD-10-CM

## 2017-08-11 DIAGNOSIS — Z76.0 MEDICATION REFILL: ICD-10-CM

## 2017-08-11 DIAGNOSIS — I10 ESSENTIAL HYPERTENSION: ICD-10-CM

## 2017-08-11 DIAGNOSIS — N39.0 URINARY TRACT INFECTION WITH HEMATURIA, SITE UNSPECIFIED: ICD-10-CM

## 2017-08-11 DIAGNOSIS — J40 BRONCHITIS: Primary | ICD-10-CM

## 2017-08-11 LAB
ALBUMIN SERPL BCP-MCNC: 3.2 G/DL
ALP SERPL-CCNC: 87 U/L
ALT SERPL W/O P-5'-P-CCNC: 15 U/L
ANION GAP SERPL CALC-SCNC: 6 MMOL/L
AST SERPL-CCNC: 16 U/L
B-HCG UR QL: NEGATIVE
BACTERIA #/AREA URNS HPF: NORMAL /HPF
BASOPHILS # BLD AUTO: 0.01 K/UL
BASOPHILS NFR BLD: 0.2 %
BILIRUB SERPL-MCNC: 0.4 MG/DL
BILIRUB UR QL STRIP: NEGATIVE
BUN SERPL-MCNC: 15 MG/DL
CALCIUM SERPL-MCNC: 10.2 MG/DL
CHLORIDE SERPL-SCNC: 105 MMOL/L
CLARITY UR: ABNORMAL
CO2 SERPL-SCNC: 28 MMOL/L
COLOR UR: YELLOW
CREAT SERPL-MCNC: 1.2 MG/DL
CTP QC/QA: YES
DIFFERENTIAL METHOD: NORMAL
EOSINOPHIL # BLD AUTO: 0.1 K/UL
EOSINOPHIL NFR BLD: 2.5 %
ERYTHROCYTE [DISTWIDTH] IN BLOOD BY AUTOMATED COUNT: 12.7 %
EST. GFR  (AFRICAN AMERICAN): >60 ML/MIN/1.73 M^2
EST. GFR  (NON AFRICAN AMERICAN): 54 ML/MIN/1.73 M^2
GLUCOSE SERPL-MCNC: 97 MG/DL
GLUCOSE UR QL STRIP: NEGATIVE
HCT VFR BLD AUTO: 39.8 %
HGB BLD-MCNC: 13.1 G/DL
HGB UR QL STRIP: ABNORMAL
KETONES UR QL STRIP: NEGATIVE
LEUKOCYTE ESTERASE UR QL STRIP: ABNORMAL
LIPASE SERPL-CCNC: 40 U/L
LYMPHOCYTES # BLD AUTO: 1.1 K/UL
LYMPHOCYTES NFR BLD: 19.4 %
MCH RBC QN AUTO: 28.5 PG
MCHC RBC AUTO-ENTMCNC: 32.9 G/DL
MCV RBC AUTO: 87 FL
MICROSCOPIC COMMENT: NORMAL
MONOCYTES # BLD AUTO: 0.6 K/UL
MONOCYTES NFR BLD: 11.2 %
NEUTROPHILS # BLD AUTO: 3.7 K/UL
NEUTROPHILS NFR BLD: 66.3 %
NITRITE UR QL STRIP: NEGATIVE
PH UR STRIP: 6 [PH] (ref 5–8)
PLATELET # BLD AUTO: 254 K/UL
PMV BLD AUTO: 10.2 FL
POTASSIUM SERPL-SCNC: 4 MMOL/L
PROT SERPL-MCNC: 7.3 G/DL
PROT UR QL STRIP: NEGATIVE
RBC # BLD AUTO: 4.59 M/UL
RBC #/AREA URNS HPF: 3 /HPF (ref 0–4)
SODIUM SERPL-SCNC: 139 MMOL/L
SP GR UR STRIP: 1.01 (ref 1–1.03)
SQUAMOUS #/AREA URNS HPF: 3 /HPF
TROPONIN I SERPL DL<=0.01 NG/ML-MCNC: <0.006 NG/ML
URN SPEC COLLECT METH UR: ABNORMAL
UROBILINOGEN UR STRIP-ACNC: NEGATIVE EU/DL
WBC # BLD AUTO: 5.52 K/UL
WBC #/AREA URNS HPF: 4 /HPF (ref 0–5)

## 2017-08-11 PROCEDURE — 87086 URINE CULTURE/COLONY COUNT: CPT

## 2017-08-11 PROCEDURE — 80053 COMPREHEN METABOLIC PANEL: CPT

## 2017-08-11 PROCEDURE — 93005 ELECTROCARDIOGRAM TRACING: CPT

## 2017-08-11 PROCEDURE — 83690 ASSAY OF LIPASE: CPT

## 2017-08-11 PROCEDURE — 25000003 PHARM REV CODE 250: Performed by: NURSE PRACTITIONER

## 2017-08-11 PROCEDURE — 85025 COMPLETE CBC W/AUTO DIFF WBC: CPT

## 2017-08-11 PROCEDURE — 84484 ASSAY OF TROPONIN QUANT: CPT

## 2017-08-11 PROCEDURE — 99284 EMERGENCY DEPT VISIT MOD MDM: CPT | Mod: 25

## 2017-08-11 PROCEDURE — 81000 URINALYSIS NONAUTO W/SCOPE: CPT

## 2017-08-11 PROCEDURE — 96365 THER/PROPH/DIAG IV INF INIT: CPT

## 2017-08-11 PROCEDURE — 81025 URINE PREGNANCY TEST: CPT | Performed by: EMERGENCY MEDICINE

## 2017-08-11 PROCEDURE — 63600175 PHARM REV CODE 636 W HCPCS: Performed by: NURSE PRACTITIONER

## 2017-08-11 PROCEDURE — 25000003 PHARM REV CODE 250: Performed by: EMERGENCY MEDICINE

## 2017-08-11 PROCEDURE — 96375 TX/PRO/DX INJ NEW DRUG ADDON: CPT

## 2017-08-11 RX ORDER — ONDANSETRON 4 MG/1
4 TABLET, ORALLY DISINTEGRATING ORAL EVERY 8 HOURS PRN
Qty: 10 TABLET | Refills: 0 | Status: SHIPPED | OUTPATIENT
Start: 2017-08-11 | End: 2017-12-05 | Stop reason: ALTCHOICE

## 2017-08-11 RX ORDER — BENZONATATE 100 MG/1
100 CAPSULE ORAL 3 TIMES DAILY PRN
Qty: 20 CAPSULE | Refills: 0 | Status: SHIPPED | OUTPATIENT
Start: 2017-08-11 | End: 2017-08-21

## 2017-08-11 RX ORDER — ONDANSETRON 2 MG/ML
4 INJECTION INTRAMUSCULAR; INTRAVENOUS
Status: COMPLETED | OUTPATIENT
Start: 2017-08-11 | End: 2017-08-11

## 2017-08-11 RX ORDER — SULFAMETHOXAZOLE AND TRIMETHOPRIM 800; 160 MG/1; MG/1
1 TABLET ORAL 2 TIMES DAILY
Qty: 14 TABLET | Refills: 0 | Status: SHIPPED | OUTPATIENT
Start: 2017-08-11 | End: 2017-08-18

## 2017-08-11 RX ORDER — LISINOPRIL 10 MG/1
10 TABLET ORAL DAILY
Qty: 20 TABLET | Refills: 0 | Status: SHIPPED | OUTPATIENT
Start: 2017-08-11 | End: 2023-03-18

## 2017-08-11 RX ORDER — BENZONATATE 100 MG/1
100 CAPSULE ORAL ONCE
Status: DISCONTINUED | OUTPATIENT
Start: 2017-08-11 | End: 2017-08-11

## 2017-08-11 RX ORDER — BENZONATATE 100 MG/1
100 CAPSULE ORAL ONCE
Status: COMPLETED | OUTPATIENT
Start: 2017-08-11 | End: 2017-08-11

## 2017-08-11 RX ADMIN — PROMETHAZINE HYDROCHLORIDE 12.5 MG: 25 INJECTION INTRAMUSCULAR; INTRAVENOUS at 09:08

## 2017-08-11 RX ADMIN — ONDANSETRON 4 MG: 2 INJECTION INTRAMUSCULAR; INTRAVENOUS at 09:08

## 2017-08-11 RX ADMIN — BENZONATATE 100 MG: 100 CAPSULE ORAL at 09:08

## 2017-08-11 RX ADMIN — SODIUM CHLORIDE 1000 ML: 0.9 INJECTION, SOLUTION INTRAVENOUS at 09:08

## 2017-08-11 NOTE — ED PROVIDER NOTES
Encounter Date: 8/11/2017       History     Chief Complaint   Patient presents with    Cough     yellow  productive cough for 2 days, + chills and sore throat, pt scheduled to have hysterectomy tuesday.    Sore Throat     47-year-old female with past medical history of hypertension presents to the emergency [facility] for evaluation of cough and sore throat.  Patient reports her symptoms started 2 days ago, and have not changed.  She reports a productive cough with green sputum.  She reports posttussive emesis ×3 today, nonbilious and nonbloody..  She has also had one episode of watery diarrhea.  She denies fever, headache, neck pain/stiffness, chest pain, shortness of breath, palpitations, abdominal pain, and rash.  She reports that her  was recently diagnosed with pneumonia and was admitted to the hospital.  She is not a smoker.  She denies history of pneumonia.  The patient is scheduled to have a hysterectomy on Thursday.      The history is provided by the patient.   Cough   This is a new problem. The current episode started two days ago. The problem occurs every few minutes. The problem has been unchanged. The cough is productive of sputum. There has been no fever. Associated symptoms include chills, rhinorrhea and sore throat. Pertinent negatives include no chest pain, no ear congestion, no ear pain, no headaches, no shortness of breath and no wheezing. She has tried nothing for the symptoms. She is not a smoker. Her past medical history does not include pneumonia, COPD or asthma.     Review of patient's allergies indicates:   Allergen Reactions    Corticosteroids (glucocorticoids) Swelling     Past Medical History:   Diagnosis Date    Abnormal Pap smear of cervix     5 years ago    Hypertension      Past Surgical History:   Procedure Laterality Date    TONSILLECTOMY, ADENOIDECTOMY  06/2017    TUBAL LIGATION       History reviewed. No pertinent family history.  Social History   Substance Use Topics     Smoking status: Never Smoker    Smokeless tobacco: Never Used    Alcohol use No     Review of Systems   Constitutional: Positive for chills. Negative for appetite change, fatigue and fever.   HENT: Positive for congestion, rhinorrhea and sore throat. Negative for ear pain.    Respiratory: Positive for cough. Negative for shortness of breath and wheezing.    Cardiovascular: Negative for chest pain and palpitations.   Gastrointestinal: Positive for vomiting. Negative for abdominal pain, diarrhea and nausea.   Genitourinary: Positive for dysuria.   Musculoskeletal: Negative for back pain, neck pain and neck stiffness.   Skin: Negative for rash.   Allergic/Immunologic: Negative for immunocompromised state.   Neurological: Negative for weakness and headaches.   Psychiatric/Behavioral: Negative for confusion.       Physical Exam     Initial Vitals [08/11/17 0842]   BP Pulse Resp Temp SpO2   118/84 110 18 99.4 °F (37.4 °C) 100 %      MAP       95.33         Physical Exam    Nursing note and vitals reviewed.  Constitutional: She appears well-developed and well-nourished. She is active and cooperative. She is easily aroused.  Non-toxic appearance. She does not have a sickly appearance. She appears ill. No distress.   HENT:   Head: Normocephalic and atraumatic.   Nose: Rhinorrhea present.   Mouth/Throat: Uvula is midline, oropharynx is clear and moist and mucous membranes are normal.   Eyes: Conjunctivae and EOM are normal.   Neck: Normal range of motion and full passive range of motion without pain. Neck supple.   Cardiovascular: Regular rhythm and normal heart sounds. Tachycardia present.    Pulmonary/Chest: Effort normal and breath sounds normal.   Abdominal: Soft. Normal appearance and bowel sounds are normal. She exhibits no distension. There is no tenderness. There is no rigidity, no rebound and no guarding.   Neurological: She is alert, oriented to person, place, and time and easily aroused. She has normal  strength. GCS eye subscore is 4. GCS verbal subscore is 5. GCS motor subscore is 6.   Skin: Skin is warm, dry and intact. No bruising and no rash noted. No erythema.   Psychiatric: She has a normal mood and affect. Her speech is normal and behavior is normal. Judgment and thought content normal. Cognition and memory are normal.         ED Course   Procedures  Labs Reviewed   COMPREHENSIVE METABOLIC PANEL - Abnormal; Notable for the following:        Result Value    Albumin 3.2 (*)     Anion Gap 6 (*)     eGFR if non  54 (*)     All other components within normal limits   URINALYSIS - Abnormal; Notable for the following:     Appearance, UA Hazy (*)     Occult Blood UA Trace (*)     Leukocytes, UA Trace (*)     All other components within normal limits   CBC W/ AUTO DIFFERENTIAL   LIPASE   LIPASE   URINALYSIS MICROSCOPIC   TROPONIN I   TROPONIN I   POCT URINE PREGNANCY     Imaging Results          X-Ray Chest PA And Lateral (Final result)  Result time 08/11/17 09:47:36    Final result by Amrit Whipple MD (08/11/17 09:47:36)                 Impression:        No acute cardiopulmonary processes.        Electronically signed by: AMRIT WHIPPLE MD  Date:     08/11/17  Time:    09:47              Narrative:    History: Cough.    Procedure: Chest 2 views    Findings:    There are no prior studies for comparison at this time.  The heart, lungs, mediastinum and pulmonary vasculature are within normal limits.  No bony thorax abnormalities.                                      Medical Decision Making:   Initial Assessment:   48yo female here for yellow productive cough for two days.  Pt also has chills and sore throat.  Post-tussive emesis.  Pt's  recently admitted for pneumonia.  Pt is scheduled to have hysterectomy on Tuesday. Pt denies CP, SOB, abd pain, diarrhea, and rash. No headache.  Occasional dysuria. Pt appears ill but nontoxic.  MM moist.  HR tachycardic but normal rhythm.  Lungs CTA and equal  with comfortable WOB.  Abd soft, non-tender, no r/r/g, no distention.  No CVA tenderness.   Differential Diagnosis:   URI, pneumonia, bronchitis, dehydration, electrolyte derangement, UTI, ELDER  Clinical Tests:   Lab Tests: Ordered and Reviewed  Radiological Study: Ordered and Reviewed  Medical Tests: Ordered and Reviewed  ED Management:  Labs, IV fluids, chest x-ray, EKG, IV Zofran, IV Phenergan, by mouth Tessalon  HR <100 after IV fluids.  Pt is able to tolerate PO fluids after Zofran and Phenergan.  CXR negative for acute changes.  UA reveals infection. Culture sent. She reports she feels much better and is requesting DC.  Pt will be treated for UTI and bronchitis.  RX Bactrim DS, Zofran ODT, Tessalon.  Pt requested refill of Lisinopril.  F/u with PCP within 2 days.  I reviewed strict return precautions.  Pt verbalized understanding, compliance, and agreement with the treatment plan.  She reports she feels comfortable with discharge.               Attending Attestation:     Physician Attestation Statement for NP/PA:   I discussed this assessment and plan of this patient with the NP/PA, but I did not personally examine the patient. The face to face encounter was performed by the NP/PA.                  ED Course     Clinical Impression:   The primary encounter diagnosis was Bronchitis. Diagnoses of Cough, Urinary tract infection with hematuria, site unspecified, Essential hypertension, and Medication refill were also pertinent to this visit.                           NATALIA Gagnon  08/11/17 0091       Sadi Galindo MD  08/11/17 [id number]

## 2017-08-12 LAB
BACTERIA UR CULT: NORMAL
BACTERIA UR CULT: NORMAL

## 2017-08-15 ENCOUNTER — ANESTHESIA (OUTPATIENT)
Dept: SURGERY | Facility: HOSPITAL | Age: 47
End: 2017-08-15
Payer: MEDICAID

## 2017-08-15 ENCOUNTER — HOSPITAL ENCOUNTER (OUTPATIENT)
Facility: HOSPITAL | Age: 47
Discharge: HOME | End: 2017-08-15
Attending: OBSTETRICS & GYNECOLOGY | Admitting: OBSTETRICS & GYNECOLOGY
Payer: MEDICAID

## 2017-08-15 VITALS
SYSTOLIC BLOOD PRESSURE: 125 MMHG | RESPIRATION RATE: 18 BRPM | DIASTOLIC BLOOD PRESSURE: 79 MMHG | TEMPERATURE: 98 F | OXYGEN SATURATION: 96 % | HEART RATE: 75 BPM | HEIGHT: 63 IN

## 2017-08-15 DIAGNOSIS — N84.0 ENDOMETRIAL POLYP: ICD-10-CM

## 2017-08-15 PROCEDURE — 27201423 OPTIME MED/SURG SUP & DEVICES STERILE SUPPLY: Performed by: OBSTETRICS & GYNECOLOGY

## 2017-08-15 PROCEDURE — 88305 TISSUE EXAM BY PATHOLOGIST: CPT | Performed by: PATHOLOGY

## 2017-08-15 PROCEDURE — 88305 TISSUE EXAM BY PATHOLOGIST: CPT | Mod: 26,,, | Performed by: PATHOLOGY

## 2017-08-15 PROCEDURE — 58563 HYSTEROSCOPY ABLATION: CPT | Mod: ,,, | Performed by: OBSTETRICS & GYNECOLOGY

## 2017-08-15 PROCEDURE — 71000015 HC POSTOP RECOV 1ST HR: Performed by: OBSTETRICS & GYNECOLOGY

## 2017-08-15 PROCEDURE — 63600175 PHARM REV CODE 636 W HCPCS: Performed by: OBSTETRICS & GYNECOLOGY

## 2017-08-15 PROCEDURE — 63600175 PHARM REV CODE 636 W HCPCS: Performed by: STUDENT IN AN ORGANIZED HEALTH CARE EDUCATION/TRAINING PROGRAM

## 2017-08-15 PROCEDURE — 25000003 PHARM REV CODE 250: Performed by: NURSE PRACTITIONER

## 2017-08-15 PROCEDURE — 37000009 HC ANESTHESIA EA ADD 15 MINS: Performed by: OBSTETRICS & GYNECOLOGY

## 2017-08-15 PROCEDURE — 71000016 HC POSTOP RECOV ADDL HR: Performed by: OBSTETRICS & GYNECOLOGY

## 2017-08-15 PROCEDURE — 71000033 HC RECOVERY, INTIAL HOUR: Performed by: OBSTETRICS & GYNECOLOGY

## 2017-08-15 PROCEDURE — 25000003 PHARM REV CODE 250: Performed by: OBSTETRICS & GYNECOLOGY

## 2017-08-15 PROCEDURE — 36000707: Performed by: OBSTETRICS & GYNECOLOGY

## 2017-08-15 PROCEDURE — 36000706: Performed by: OBSTETRICS & GYNECOLOGY

## 2017-08-15 PROCEDURE — 71000039 HC RECOVERY, EACH ADD'L HOUR: Performed by: OBSTETRICS & GYNECOLOGY

## 2017-08-15 PROCEDURE — 37000008 HC ANESTHESIA 1ST 15 MINUTES: Performed by: OBSTETRICS & GYNECOLOGY

## 2017-08-15 RX ORDER — METOCLOPRAMIDE HYDROCHLORIDE 5 MG/ML
10 INJECTION INTRAMUSCULAR; INTRAVENOUS EVERY 10 MIN PRN
Status: DISCONTINUED | OUTPATIENT
Start: 2017-08-15 | End: 2017-08-15 | Stop reason: HOSPADM

## 2017-08-15 RX ORDER — ONDANSETRON 8 MG/1
8 TABLET, ORALLY DISINTEGRATING ORAL EVERY 8 HOURS PRN
Status: DISCONTINUED | OUTPATIENT
Start: 2017-08-15 | End: 2017-08-15 | Stop reason: HOSPADM

## 2017-08-15 RX ORDER — OXYCODONE HYDROCHLORIDE 5 MG/1
10 TABLET ORAL EVERY 4 HOURS PRN
Status: DISCONTINUED | OUTPATIENT
Start: 2017-08-15 | End: 2017-08-15 | Stop reason: HOSPADM

## 2017-08-15 RX ORDER — KETOROLAC TROMETHAMINE 30 MG/ML
INJECTION, SOLUTION INTRAMUSCULAR; INTRAVENOUS
Status: DISCONTINUED | OUTPATIENT
Start: 2017-08-15 | End: 2017-08-15

## 2017-08-15 RX ORDER — SUCCINYLCHOLINE CHLORIDE 20 MG/ML
INJECTION INTRAMUSCULAR; INTRAVENOUS
Status: DISCONTINUED | OUTPATIENT
Start: 2017-08-15 | End: 2017-08-15

## 2017-08-15 RX ORDER — SODIUM CHLORIDE, SODIUM LACTATE, POTASSIUM CHLORIDE, CALCIUM CHLORIDE 600; 310; 30; 20 MG/100ML; MG/100ML; MG/100ML; MG/100ML
INJECTION, SOLUTION INTRAVENOUS CONTINUOUS
Status: DISCONTINUED | OUTPATIENT
Start: 2017-08-15 | End: 2017-08-15 | Stop reason: HOSPADM

## 2017-08-15 RX ORDER — DIPHENHYDRAMINE HCL 25 MG
25 CAPSULE ORAL EVERY 4 HOURS PRN
Status: DISCONTINUED | OUTPATIENT
Start: 2017-08-15 | End: 2017-08-15 | Stop reason: HOSPADM

## 2017-08-15 RX ORDER — LIDOCAINE HCL/PF 100 MG/5ML
SYRINGE (ML) INTRAVENOUS
Status: DISCONTINUED | OUTPATIENT
Start: 2017-08-15 | End: 2017-08-15

## 2017-08-15 RX ORDER — ONDANSETRON 2 MG/ML
4 INJECTION INTRAMUSCULAR; INTRAVENOUS DAILY PRN
Status: DISCONTINUED | OUTPATIENT
Start: 2017-08-15 | End: 2017-08-15 | Stop reason: HOSPADM

## 2017-08-15 RX ORDER — FENTANYL CITRATE 50 UG/ML
INJECTION, SOLUTION INTRAMUSCULAR; INTRAVENOUS
Status: DISCONTINUED | OUTPATIENT
Start: 2017-08-15 | End: 2017-08-15

## 2017-08-15 RX ORDER — PROPOFOL 10 MG/ML
VIAL (ML) INTRAVENOUS
Status: DISCONTINUED | OUTPATIENT
Start: 2017-08-15 | End: 2017-08-15

## 2017-08-15 RX ORDER — DIPHENHYDRAMINE HYDROCHLORIDE 50 MG/ML
25 INJECTION INTRAMUSCULAR; INTRAVENOUS EVERY 4 HOURS PRN
Status: DISCONTINUED | OUTPATIENT
Start: 2017-08-15 | End: 2017-08-15 | Stop reason: HOSPADM

## 2017-08-15 RX ORDER — OXYCODONE HYDROCHLORIDE 5 MG/1
5 TABLET ORAL EVERY 4 HOURS PRN
Status: DISCONTINUED | OUTPATIENT
Start: 2017-08-15 | End: 2017-08-15 | Stop reason: HOSPADM

## 2017-08-15 RX ORDER — IBUPROFEN 400 MG/1
800 TABLET ORAL EVERY 8 HOURS
Status: DISCONTINUED | OUTPATIENT
Start: 2017-08-16 | End: 2017-08-15 | Stop reason: HOSPADM

## 2017-08-15 RX ORDER — CEFAZOLIN SODIUM 2 G/50ML
2 SOLUTION INTRAVENOUS
Status: COMPLETED | OUTPATIENT
Start: 2017-08-15 | End: 2017-08-15

## 2017-08-15 RX ORDER — OXYCODONE AND ACETAMINOPHEN 5; 325 MG/1; MG/1
1 TABLET ORAL
Status: DISCONTINUED | OUTPATIENT
Start: 2017-08-15 | End: 2017-08-15 | Stop reason: HOSPADM

## 2017-08-15 RX ORDER — KETOROLAC TROMETHAMINE 30 MG/ML
30 INJECTION, SOLUTION INTRAMUSCULAR; INTRAVENOUS EVERY 6 HOURS
Status: DISCONTINUED | OUTPATIENT
Start: 2017-08-15 | End: 2017-08-15 | Stop reason: HOSPADM

## 2017-08-15 RX ORDER — HYDROMORPHONE HYDROCHLORIDE 2 MG/ML
1 INJECTION, SOLUTION INTRAMUSCULAR; INTRAVENOUS; SUBCUTANEOUS EVERY 4 HOURS PRN
Status: DISCONTINUED | OUTPATIENT
Start: 2017-08-15 | End: 2017-08-15 | Stop reason: HOSPADM

## 2017-08-15 RX ORDER — DIPHENHYDRAMINE HYDROCHLORIDE 50 MG/ML
25 INJECTION INTRAMUSCULAR; INTRAVENOUS EVERY 6 HOURS PRN
Status: DISCONTINUED | OUTPATIENT
Start: 2017-08-15 | End: 2017-08-15 | Stop reason: HOSPADM

## 2017-08-15 RX ORDER — HYDROMORPHONE HYDROCHLORIDE 2 MG/ML
0.4 INJECTION, SOLUTION INTRAMUSCULAR; INTRAVENOUS; SUBCUTANEOUS EVERY 5 MIN PRN
Status: DISCONTINUED | OUTPATIENT
Start: 2017-08-15 | End: 2017-08-15 | Stop reason: HOSPADM

## 2017-08-15 RX ORDER — PHENYLEPHRINE HYDROCHLORIDE 10 MG/ML
INJECTION INTRAVENOUS
Status: DISCONTINUED | OUTPATIENT
Start: 2017-08-15 | End: 2017-08-15

## 2017-08-15 RX ORDER — ONDANSETRON 2 MG/ML
INJECTION INTRAMUSCULAR; INTRAVENOUS
Status: DISCONTINUED | OUTPATIENT
Start: 2017-08-15 | End: 2017-08-15

## 2017-08-15 RX ORDER — MEPERIDINE HYDROCHLORIDE 50 MG/ML
12.5 INJECTION INTRAMUSCULAR; INTRAVENOUS; SUBCUTANEOUS ONCE AS NEEDED
Status: DISCONTINUED | OUTPATIENT
Start: 2017-08-15 | End: 2017-08-15 | Stop reason: HOSPADM

## 2017-08-15 RX ORDER — MIDAZOLAM HYDROCHLORIDE 1 MG/ML
INJECTION, SOLUTION INTRAMUSCULAR; INTRAVENOUS
Status: DISCONTINUED | OUTPATIENT
Start: 2017-08-15 | End: 2017-08-15

## 2017-08-15 RX ORDER — LIDOCAINE HYDROCHLORIDE 10 MG/ML
1 INJECTION, SOLUTION EPIDURAL; INFILTRATION; INTRACAUDAL; PERINEURAL ONCE
Status: DISCONTINUED | OUTPATIENT
Start: 2017-08-15 | End: 2017-08-15 | Stop reason: HOSPADM

## 2017-08-15 RX ADMIN — PROPOFOL 200 MG: 10 INJECTION, EMULSION INTRAVENOUS at 11:08

## 2017-08-15 RX ADMIN — HYDROMORPHONE HYDROCHLORIDE 0.4 MG: 2 INJECTION INTRAMUSCULAR; INTRAVENOUS; SUBCUTANEOUS at 12:08

## 2017-08-15 RX ADMIN — PHENYLEPHRINE HYDROCHLORIDE 100 MCG: 10 INJECTION INTRAVENOUS at 11:08

## 2017-08-15 RX ADMIN — MIDAZOLAM 2 MG: 1 INJECTION INTRAMUSCULAR; INTRAVENOUS at 11:08

## 2017-08-15 RX ADMIN — FENTANYL CITRATE 100 MCG: 50 INJECTION, SOLUTION INTRAMUSCULAR; INTRAVENOUS at 11:08

## 2017-08-15 RX ADMIN — HYDROMORPHONE HYDROCHLORIDE 0.4 MG: 2 INJECTION INTRAMUSCULAR; INTRAVENOUS; SUBCUTANEOUS at 01:08

## 2017-08-15 RX ADMIN — SUCCINYLCHOLINE CHLORIDE 120 MG: 20 INJECTION, SOLUTION INTRAMUSCULAR; INTRAVENOUS at 11:08

## 2017-08-15 RX ADMIN — SODIUM CHLORIDE, SODIUM LACTATE, POTASSIUM CHLORIDE, AND CALCIUM CHLORIDE: .6; .31; .03; .02 INJECTION, SOLUTION INTRAVENOUS at 11:08

## 2017-08-15 RX ADMIN — OXYCODONE HYDROCHLORIDE 5 MG: 5 TABLET ORAL at 01:08

## 2017-08-15 RX ADMIN — CEFAZOLIN SODIUM 2 G: 2 SOLUTION INTRAVENOUS at 11:08

## 2017-08-15 RX ADMIN — PROMETHAZINE HYDROCHLORIDE 12.5 MG: 25 INJECTION INTRAMUSCULAR; INTRAVENOUS at 01:08

## 2017-08-15 RX ADMIN — ONDANSETRON 4 MG: 2 INJECTION, SOLUTION INTRAMUSCULAR; INTRAVENOUS at 12:08

## 2017-08-15 RX ADMIN — LIDOCAINE HYDROCHLORIDE 80 MG: 20 INJECTION, SOLUTION INTRAVENOUS at 11:08

## 2017-08-15 RX ADMIN — KETOROLAC TROMETHAMINE 30 MG: 30 INJECTION, SOLUTION INTRAMUSCULAR; INTRAVENOUS at 11:08

## 2017-08-15 NOTE — PLAN OF CARE
Pt meets PACU discharge criteria. Pt signed out of PACU by Dr Pollack. Report given to NATHANIEL Burgos.

## 2017-08-15 NOTE — PLAN OF CARE
Urinated without difficulty and is ready to go. Discharge instructed given with claimed understanding.

## 2017-08-15 NOTE — TRANSFER OF CARE
"Anesthesia Transfer of Care Note    Patient: Lorelei Norris    Procedure(s) Performed: Procedure(s) (LRB):  [id number] (N/A)  ABLATION-ENDOMETRIAL (N/A)    Patient location: PACU    Anesthesia Type: general    Transport from OR: Transported from OR on 6-10 L/min O2 by face mask with adequate spontaneous ventilation    Post pain: adequate analgesia    Post assessment: no apparent anesthetic complications and tolerated procedure well    Post vital signs: stable    Level of consciousness: awake, alert and oriented    Nausea/Vomiting: no nausea/vomiting    Complications: other (see comments)    Transfer of care protocol was followedComments: Aspiration on induction       Last vitals:   Visit Vitals  Pulse 101   Temp 36.8 °C (98.2 °F) (Oral)   Resp 18   Ht 5' 3" (1.6 m)   SpO2 98%   Breastfeeding? No     "

## 2017-08-15 NOTE — PLAN OF CARE
Report received from NATHANIEL Burgos. Pt aaox3. Respirations even and unlabored. Pt complains of 8/10 abdominal pain. Abdomen soft and non distended. vss (see flowsheet). Stephanie pad c//d/i

## 2017-08-15 NOTE — DISCHARGE INSTRUCTIONS
Discharge Instructions for Dilation and Curettage (D and C)  Your doctor performed dilation and curettage (D&C). The reasons for having this procedure vary from person to person. The D&C may be done to control heavy uterine bleeding, to find the cause of irregular bleeding, to perform an , or to remove pregnancy tissue if you have had a miscarriage.    Home care  · Take it easy. Rest for 2 days as needed.  · Return to your normal activities after 24 to 48 hours. You may also return to work at that time.  · Eat a normal diet.  · Take an over-the-counter pain reliever for pain, if needed.  · Remember, its OK to have bleeding for about a week after the procedure. The amount of bleeding should be similar to what you have during a normal period.  · Dont drive for 24 hours after the procedure unless specifically told by your provider that it is OK to do so.  · Dont have sexual intercourse or use tampons or douches until your doctor says its safe to do so.  ·   Follow-up    · Make a follow-up appointment as directed by our staff.     When to call your doctor  Call your doctor right away if you have any of the following:  · Bleeding that soaks more than one sanitary pad in one hour  · Severe abdominal pain  · Severe cramps  · Fever above 100.4°F (38.0°C)  · A foul smelling vaginal discharge       Hysteroscopy    Hysteroscopy is a procedure that is done to see inside your uterus. It can help find the cause of problems in the uterus. This helps your health care provider decide on the best treatment. In some cases, it can be used to perform treatment. Hysteroscopy may be done in your health care provider's office or in the hospital.    Why might I need hysteroscopy?  Hysteroscopy may be done based on the results of other tests. It can help find the cause of problems. These can include:  · Unusually heavy or long menstrual periods  · Bleeding between periods  · Postmenopausal bleeding  · Trouble becoming pregnant  (infertility) or carrying a pregnancy to term  · To locate an intrauterine device (IUD)  · To perform sterilization  ·   What are the risks and complications of hysteroscopy?  Problems with the procedure are rare. But all procedures have risks. Risks of hysteroscopy include:  · Infection  · Bleeding  · Tearing of the uterine wall  · Damage to internal organs  · Scarring of the uterus  · Fluid overload  · Problems with anesthesia (the medication that prevents pain during the procedure)  ·   How do I get ready for hysteroscopy?  · Tell your health care provider if you have any health problems. These include diabetes, heart disease, or bleeding problems.  · Tell your health care provider about all the medicines you take. This includes any over-the-counter medications, herbs, or supplements.  · You may be told not to use vaginal creams or medication. And you may be told not to have sex or douche.  · You may be told not to eat or drink the night before the procedure.  · You may be tested for pregnancy and infection.  · You may be asked to sign a consent form.  · You may be given a pain reliever to take an hour before the procedure. This helps relieve cramping that may occur.  ·   What happens during a hysteroscopy?  · Youll lie on an exam table with your feet in stirrups.  · You may be given general anesthesia or medicines to help you relax or sleep. In some cases, an IV line will be put into a vein in your arm or hand. This line is then used to give fluids and medicines.  · A tool called a speculum is inserted into the vagina to hold it open. A tool called a dilator may be used to widen the cervix.  · Numbing medicine may be applied to the cervix.  · The hysteroscope (a long, thin lighted tube) is inserted through the vagina and into the uterus. It is used to see inside the uterus. Images of the uterus are viewed on a monitor.  · A gas or fluid may be injected into the uterus to expand it.  · Other tools may be put  through the hysteroscope. These are used to take tissue samples, remove growths, or place implants for the purpose of sterilization.  ·   What happens after hysteroscopy?  · You may have cramps and bleeding for 24 hours after the procedure. This is normal. Use pads instead of tampons.  · Do not douche or use tampons until your health care provider says its OK.  · Do not use any vaginal medicines until you are told its OK.  · Ask your health care provider when its OK to have sex again.  When should I call my health care provider?  Call your health care provider if you have:  · Heavy bleeding (more than 1 pad an hour for 2 or more hours)  · A fever over 100.4°F (38.0°C)  · Increasing abdominal pain or tenderness  · Foul-smelling discharge   Follow-up care  Schedule a follow-up visit with your health care provider. Based on the results of your test, you may need more treatment. Be sure to follow instructions and keep your appointments.      Notify Dr. Mendez for any problems or concerns.

## 2017-08-15 NOTE — OP NOTE
DATE OF PROCEDURE:  08/15/2017    PREOPERATIVE DIAGNOSES:  1.  Metromenorrhagia.  2.  Endometrial polyp.    POSTOPERATIVE DIAGNOSES:  1.  Metromenorrhagia.  2.  Endometrial polyp.    PROCEDURES:  D and C, hysteroscopy with Adilia ablation.    SURGEON:  Joaquin Mendez MD    ANESTHESIA:  General endotracheal.    COMPLICATIONS:  None     ESTIMATED BLODD LOSS:  Less than 5 mL.    PROCEDURE IN DETAIL:  After assuring informed consent, the patient was   transferred to the Operating Room where she underwent general endotracheal   anesthesia without difficulty.  Abdomen and perineum were prepped and draped in   normal sterile fashion in dorsal supine position.  Legs were placed in   adjustable stirrups.  Following draping In/Out catheterization removed 5 mL of   clear urine.  [place] speculum was placed in vagina.  A long Allis clamp was   placed on the anterior lip of the cervix.  The cervix was then dilated to a #8   Hegar dilator.  Hysteroscopy was performed, which revealed thickened endometrial   tissue throughout with a small sessile polyp on the posterior wall of the   uterus and the hysteroscope was then removed and sharp curettage was performed   removing a moderate amount of endometrial tissue , follow up hysteroscopy   after the curettage revealed an adequate removal of polyp.  The hysteroscope was   then removed and then the Adilia endometrial ablation device was set.  The sound length was 9cm   was cervical length was 4,  the device was set to 5 cm  and was advanced to the endocervical   canal to the fundus and the array was opened and tapped to the fundus.  The   balloon was insufflated and the cavity integrity test was performed and passed.    The ablation occurred for 120 seconds without any difficulty.  The instrument   was then removed.  Following completion of the ablation, hysteroscopy was   performed.  Post-ablation, pictures were obtained.  The patient had an   adequately ablated endometrium.  All  instruments were then removed from the   vagina.  Pathological specimens included endometrial curetting/polyp.      TANNER/IN  dd: 08/15/2017 12:17:07 (CDT)  td: 08/15/2017 20:04:55 (CDT)  Doc ID   #[id number]  Job ID #[id number]    CC:     Dictated [id number]

## 2017-08-15 NOTE — INTERVAL H&P NOTE
The patient has been examined and the H&P has been reviewed:    I concur with the findings and no changes have occurred since H&P was written.   Lab Results   Component Value Date    WBC 5.52 08/11/2017    HGB 13.1 08/11/2017    HCT 39.8 08/11/2017    MCV 87 08/11/2017     08/11/2017     Anesthesia/Surgery risks, benefits and alternative options discussed and understood by patient/family.          There are no hospital problems to display for this patient.

## 2017-08-15 NOTE — ANESTHESIA POSTPROCEDURE EVALUATION
"Anesthesia Post Evaluation    Patient: Lorelei Norris    Procedure(s) Performed: Procedure(s) (LRB):  [id number] (N/A)  ABLATION-ENDOMETRIAL (N/A)    Final Anesthesia Type: general  Patient location during evaluation: PACU  Patient participation: Yes- Able to Participate  Level of consciousness: awake and alert  Post-procedure vital signs: reviewed and stable  Pain management: adequate  Airway patency: patent  PONV status at discharge: No PONV  Anesthetic complications: no      Cardiovascular status: blood pressure returned to baseline  Respiratory status: unassisted  Hydration status: euvolemic  Follow-up not needed.        Visit Vitals  /75   Pulse 86   Temp 36.6 °C (97.8 °F) (Skin)   Resp 15   Ht 5' 3" (1.6 m)   SpO2 96%   Breastfeeding? No       Pain/Cedrick Score: Presence of Pain: complains of pain/discomfort (8/15/2017 12:25 PM)  Pain Rating Prior to Med Admin: 9 (8/15/2017  1:13 PM)  Cedrick Score: 8 (8/15/2017 12:25 PM)      "

## 2017-08-15 NOTE — BRIEF OP NOTE
[facility]  Brief Operative Note     SUMMARY     Surgery Date: 8/15/2017     Surgeon(s) and Role:     * Joaquin Mendez MD - Primary    Assisting Surgeon: None    Pre-op Diagnosis:  Menorrhagia with regular cycle [N92.0]    Post-op Diagnosis:  Post-Op Diagnosis Codes:     * Menorrhagia with regular cycle [N92.0]    Procedure(s) (LRB):  [id number] (N/A)  ABLATION-ENDOMETRIAL (N/A)    Anesthesia: General    Description of the findings of the procedure:  Thickened Endometrial tissue, posterior sessile polyp  Sounded to 9cm cx length 4cm      Estimated Blood Loss:10cc  Specimens:   Specimen (12h ago through future)    None      endometrial currettings    Joaquin Mendez MD    Discharge Note    SUMMARY     Admit Date: 8/15/2017    Discharge Date and Time:  08/15/2017 12:00 PM    Hospital Course $6 y/o with endometrial polyp and menorrhagia here for D&C Hyst and ablation. Underwent procedure w/o difficulty and was d/c'd home on same day     Final Diagnosis: Post-Op Diagnosis Codes:     * Menorrhagia with regular cycle [N92.0]    Disposition: Home or Self Care    Follow Up/Patient Instructions:     Medications:  Reconciled Home Medications:   Current Discharge Medication List      CONTINUE these medications which have NOT CHANGED    Details   albuterol 90 mcg/actuation inhaler Inhale 2 puffs into the lungs every 6 (six) hours as needed for Wheezing. Rescue  Qty: 18 g, Refills: 1    Associated Diagnoses: Mild intermittent asthma without complication      benzonatate (TESSALON) 100 MG capsule Take 1 capsule (100 mg total) by mouth 3 (three) times daily as needed for Cough.  Qty: 20 capsule, Refills: 0      lisinopril 10 MG tablet Take 1 tablet (10 mg total) by mouth once daily.  Qty: 20 tablet, Refills: 0    Associated Diagnoses: Essential hypertension      sulfamethoxazole-trimethoprim 800-160mg (BACTRIM DS) 800-160 mg Tab Take 1 tablet by mouth 2 (two) times daily.  Qty: 14 tablet,  Refills: 0      epinephrine (EPIPEN) 0.3 mg/0.3 mL AtIn Inject 0.3 mLs (0.3 mg total) into the muscle as needed.  Qty: 1 Device, Refills: 0      ondansetron (ZOFRAN-ODT) 4 MG TbDL Take 1 tablet (4 mg total) by mouth every 8 (eight) hours as needed.  Qty: 10 tablet, Refills: 0         D/C pt. To home in stable condition  Reg diet  Ad yoni activity with pelvic rest x 6 wks  Call for fever >101, heavy vag bleeding, pain uncontrolled w/ pain meds  F/u in office in and  6-8wks for final check-up  Motrin 800mg, Percocet 5/325mg  Joaquin Mendez MD    No discharge procedures on file.

## 2017-08-19 ENCOUNTER — HOSPITAL ENCOUNTER (EMERGENCY)
Facility: HOSPITAL | Age: 47
Discharge: HOME | End: 2017-08-19
Attending: EMERGENCY MEDICINE
Payer: MEDICAID

## 2017-08-19 VITALS
BODY MASS INDEX: 31.89 KG/M2 | OXYGEN SATURATION: 99 % | WEIGHT: 180 LBS | TEMPERATURE: 98 F | DIASTOLIC BLOOD PRESSURE: 71 MMHG | HEART RATE: 80 BPM | HEIGHT: 63 IN | RESPIRATION RATE: 22 BRPM | SYSTOLIC BLOOD PRESSURE: 117 MMHG

## 2017-08-19 DIAGNOSIS — R05.9 COUGH: ICD-10-CM

## 2017-08-19 LAB
ALBUMIN SERPL BCP-MCNC: 3.3 G/DL
ALP SERPL-CCNC: 104 U/L
ALT SERPL W/O P-5'-P-CCNC: 28 U/L
ANION GAP SERPL CALC-SCNC: 10 MMOL/L
AST SERPL-CCNC: 36 U/L
BASOPHILS # BLD AUTO: 0.01 K/UL
BASOPHILS NFR BLD: 0.2 %
BILIRUB SERPL-MCNC: 0.5 MG/DL
BUN SERPL-MCNC: 12 MG/DL
CALCIUM SERPL-MCNC: 10.1 MG/DL
CHLORIDE SERPL-SCNC: 104 MMOL/L
CO2 SERPL-SCNC: 24 MMOL/L
CREAT SERPL-MCNC: 1.1 MG/DL
D DIMER PPP IA.FEU-MCNC: 0.81 MG/L FEU
DIFFERENTIAL METHOD: ABNORMAL
EOSINOPHIL # BLD AUTO: 0.2 K/UL
EOSINOPHIL NFR BLD: 4.3 %
ERYTHROCYTE [DISTWIDTH] IN BLOOD BY AUTOMATED COUNT: 12.6 %
EST. GFR  (AFRICAN AMERICAN): >60 ML/MIN/1.73 M^2
EST. GFR  (NON AFRICAN AMERICAN): 60 ML/MIN/1.73 M^2
GLUCOSE SERPL-MCNC: 99 MG/DL
HCT VFR BLD AUTO: 40.3 %
HGB BLD-MCNC: 13.5 G/DL
LYMPHOCYTES # BLD AUTO: 2.2 K/UL
LYMPHOCYTES NFR BLD: 51.3 %
MCH RBC QN AUTO: 28.5 PG
MCHC RBC AUTO-ENTMCNC: 33.5 G/DL
MCV RBC AUTO: 85 FL
MONOCYTES # BLD AUTO: 0.2 K/UL
MONOCYTES NFR BLD: 5.2 %
NEUTROPHILS # BLD AUTO: 1.6 K/UL
NEUTROPHILS NFR BLD: 38.8 %
PLATELET # BLD AUTO: 279 K/UL
PMV BLD AUTO: 9.8 FL
POTASSIUM SERPL-SCNC: 4.5 MMOL/L
PROT SERPL-MCNC: 8.1 G/DL
RBC # BLD AUTO: 4.73 M/UL
SODIUM SERPL-SCNC: 138 MMOL/L
WBC # BLD AUTO: 4.21 K/UL

## 2017-08-19 PROCEDURE — 80053 COMPREHEN METABOLIC PANEL: CPT

## 2017-08-19 PROCEDURE — 25000003 PHARM REV CODE 250: Performed by: EMERGENCY MEDICINE

## 2017-08-19 PROCEDURE — 85025 COMPLETE CBC W/AUTO DIFF WBC: CPT

## 2017-08-19 PROCEDURE — 25500020 PHARM REV CODE 255: Performed by: EMERGENCY MEDICINE

## 2017-08-19 PROCEDURE — 99284 EMERGENCY DEPT VISIT MOD MDM: CPT | Mod: 25

## 2017-08-19 PROCEDURE — 25000242 PHARM REV CODE 250 ALT 637 W/ HCPCS: Performed by: EMERGENCY MEDICINE

## 2017-08-19 PROCEDURE — 85379 FIBRIN DEGRADATION QUANT: CPT

## 2017-08-19 PROCEDURE — 94640 AIRWAY INHALATION TREATMENT: CPT

## 2017-08-19 RX ORDER — AZITHROMYCIN 250 MG/1
250 TABLET, FILM COATED ORAL DAILY
Qty: 6 TABLET | Refills: 0 | Status: SHIPPED | OUTPATIENT
Start: 2017-08-19 | End: 2017-08-29

## 2017-08-19 RX ORDER — IPRATROPIUM BROMIDE AND ALBUTEROL SULFATE 2.5; .5 MG/3ML; MG/3ML
3 SOLUTION RESPIRATORY (INHALATION)
Status: COMPLETED | OUTPATIENT
Start: 2017-08-19 | End: 2017-08-19

## 2017-08-19 RX ORDER — AZITHROMYCIN 250 MG/1
500 TABLET, FILM COATED ORAL
Status: COMPLETED | OUTPATIENT
Start: 2017-08-19 | End: 2017-08-19

## 2017-08-19 RX ADMIN — IPRATROPIUM BROMIDE AND ALBUTEROL SULFATE 3 ML: .5; 3 SOLUTION RESPIRATORY (INHALATION) at 11:08

## 2017-08-19 RX ADMIN — AZITHROMYCIN 500 MG: 250 TABLET, FILM COATED ORAL at 02:08

## 2017-08-19 RX ADMIN — IOHEXOL 100 ML: 350 INJECTION, SOLUTION INTRAVENOUS at 01:08

## 2017-08-19 NOTE — ED NOTES
8 days ago was in ER and given antibiotic for cough. 4 days ago she had a D&C procedure. 2 days ago started feeling more SOB and cough worsening. Has finished antibiotic and cough medicine prescribed not working.

## 2017-08-19 NOTE — DISCHARGE INSTRUCTIONS
Complete the antibiotic prescribed by your PCP (Bactrim).  You were also started on Zithromax and will complete the course of this one, taking 250 mg daily until completed.  Although no pneumonia showed on your chest x-rays,  an early patchy pneumonia along with your bronchitis may exist. For this reason you are being placed on Zithromax.

## 2017-08-19 NOTE — ED PROVIDER NOTES
Encounter Date: 2017       History     Chief Complaint   Patient presents with    Cough     symptoms for 2 weeks, pt seen last friday, symptoms continue. finished meds.    Shortness of Breath     Lorelei Norris, a 47 y.o. female, complains of persistent cough and wheezing for the past 2 weeks.  She states she was put on antibiotics (Bactrim) for bronchitis.  She has a history of asthma and has been using her albuterol for wheezing.  She has a low-grade fever last week.  On Tuesday of this week she has had a D&C and is not having any problems in that regard.  She said she continues to have a productive cough without blood, but complains of shortness of breath and persistent wheezing.  She denies any leg pains.  Pain location: Pain with coughing  Pain Severity: Moderate    Pain timin weeks  Pain character: Sharp    Associated with or Modified by: (see above)              Review of patient's allergies indicates:   Allergen Reactions    Corticosteroids (glucocorticoids) Swelling     Past Medical History:   Diagnosis Date    Abnormal Pap smear of cervix     5 years ago    Hypertension      Past Surgical History:   Procedure Laterality Date    TONSILLECTOMY, ADENOIDECTOMY  2017    TUBAL LIGATION       History reviewed. No pertinent family history.  Social History   Substance Use Topics    Smoking status: Never Smoker    Smokeless tobacco: Never Used    Alcohol use No     Review of Systems   Constitutional: Negative.    HENT: Negative.    Respiratory: Positive for cough, shortness of breath and wheezing.         Sharp chest pain with coughing   Gastrointestinal: Negative.    Genitourinary: Positive for vaginal bleeding.        Recent D and C on Tuesday of this week for abnormal uterine  bleeding   All other systems reviewed and are negative.      Physical Exam     Initial Vitals   BP Pulse Resp Temp SpO2   17 1100 17 1100 17 1100 17 1108 17 1100   110/76 (!) 113 20 98.7 °F  (37.1 °C) 98 %      MAP       08/19/17 1100       87.33         Physical Exam    Nursing note and vitals reviewed.  Constitutional: She appears well-developed and well-nourished. No distress.   Eyes: Conjunctivae and EOM are normal. Pupils are equal, round, and reactive to light.   Neck: Normal range of motion. Neck supple.   Cardiovascular: Normal rate, regular rhythm and normal heart sounds.   Pulmonary/Chest:   Occasional expiratory wheezing clears with coughing   Abdominal: Soft. There is no tenderness.   Musculoskeletal:   No deep calf tenderness bilaterally.   Neurological: She is oriented to person, place, and time. She has normal strength.   Skin: Skin is warm and dry.   Psychiatric: She has a normal mood and affect. Her behavior is normal. Thought content normal.         ED Course   Procedures  Labs Reviewed   CBC W/ AUTO DIFFERENTIAL   COMPREHENSIVE METABOLIC PANEL   D DIMER, QUANTITATIVE     Imaging Results          CTA Chest Non-Coronary (PE Study) (Final result)  Result time 08/19/17 13:36:50    Final result by Eyad Oconnell DO (08/19/17 13:36:50)                 Impression:     No PE.    Patchy ill-defined lung opacities within the lungs bilaterally while nonspecific primarily concerning for developing bronchopneumonia. Clinical correlation and followup recommended.      Electronically signed by: EYAD OCONNELL DO  Date:     08/19/17  Time:    13:36              Narrative:    Procedure: CTA of the thorax     Technique: 1.25-mm axial images of the thorax post 100-ML of Omnipaque 350 intravenous contrast administration.        Comparison: Chest x-ray earlier today 08/19/2017 11:32 AM    Results:There is ill-defined somewhat bronchocentric patchy ground glass in soft tissue opacities within the right lung most prominent in the right upper lobe and superior segment of the right lower lobe with additional and is significantly smaller degree of foci in the left lung most prominent in the lower lobe.  Configuration concerning for developing bronchopneumonia. Clinical correlation and followup    No pleural effusion or  pneumothorax. Prominent soft tissue density material anterior mediastinum felt to represent residual thymic tissue. A few scattered prominent axillary lymph nodes without definite adenopathy..  No intraluminal filling defects within the central or segmental pulmonary arteries to suggest pulmonary artery embolus.  No pneumothorax.  No hilar or mediastinal mass or adenopathy.  No pericardial effusion.                             X-Ray Chest PA And Lateral (Final result)  Result time 08/19/17 11:41:46    Final result by Lissette Prince MD (08/19/17 11:41:46)                 Impression:      No abnormality seen.      Electronically signed by: LISSETTE PRINCE MD  Date:     08/19/17  Time:    11:41              Narrative:    PA AND LATERAL CHEST    Comparison: 8/11/17    Results: PA and lateral views of the chest.The lung volume appears adequate.  No parenchymal or pleural abnormality seen.  The cardiac silhouette and pulmonary vascularity appear within normal limits.  No evidence of hilar lymph node enlargement.  No accumulation of pleural fluid or pneumothorax.  The osseous structures appear within normal limits.                                      Medical Decision Making:   Initial Assessment:    47 y.o. female, complains of persistent cough and wheezing for the past 2 weeks.  She states she was put on antibiotics (Bactrim) for bronchitis.  She has a history of asthma and has been using her albuterol for wheezing.  She has a low-grade fever last week.  On Tuesday of this week she has had a D&C and is not having any problems in that regard.  She said she continues to have a productive cough without blood, but complains of shortness of breath and persistent wheezing.  She denies any leg pains.  Pain location: Pain with coughing  Pain Severity: Moderate    PE: No acute distress; afebrile; lungs  are clear bilaterally with occasional expiratory wheezing  Differential Diagnosis:   Pneumonia, PE, bronchitis, viral illness, URI  Clinical Tests:   Lab Tests: Ordered and Reviewed  Radiological Study: Ordered and Reviewed  ED Management:  Chest x-rays reveal no acute infiltrates.  D-dimer was minimally elevated and a CT of the chest PE study was ordered.  There are no pulmonary emboli present.  However, some small patchy areas suggestive of an early pneumonitis.  In view of the fact that the chest x-ray was negative for infiltrates and the patient is on Bactrim she'll continue her Bactrim but will be started on Zithromax, she will also receive a cough suppressant and follow-up with her primary care physician this week.  She is also encouraged to continue to use her albuterol as needed.                      ED Course     Clinical Impression:   The encounter diagnosis was Cough.                           Thuan Angeles Jr., MD  08/19/17 1430       Thuan Angeles Jr., MD  08/19/17 1430

## 2017-12-02 ENCOUNTER — NURSE TRIAGE (OUTPATIENT)
Dept: ADMINISTRATIVE | Facility: CLINIC | Age: 47
End: 2017-12-02

## 2017-12-02 NOTE — TELEPHONE ENCOUNTER
"    Reason for Disposition   [1] SEVERE abdominal pain (e.g., excruciating) AND [2] present > 1 hour    Protocols used: ST VAGINAL [id number]    Severe abdominal pain and nausea. Blood and mucous "dripping" from vagina. Patient advised to go the ED for evaluation, she verbalized understanding.   "

## 2017-12-05 ENCOUNTER — HOSPITAL ENCOUNTER (EMERGENCY)
Facility: HOSPITAL | Age: 47
Discharge: HOME | End: 2017-12-05
Attending: EMERGENCY MEDICINE
Payer: MEDICAID

## 2017-12-05 VITALS
SYSTOLIC BLOOD PRESSURE: 137 MMHG | HEIGHT: 63 IN | DIASTOLIC BLOOD PRESSURE: 82 MMHG | OXYGEN SATURATION: 100 % | TEMPERATURE: 98 F | WEIGHT: 174 LBS | RESPIRATION RATE: 16 BRPM | HEART RATE: 62 BPM | BODY MASS INDEX: 30.83 KG/M2

## 2017-12-05 DIAGNOSIS — K80.50 BILIARY COLIC: Primary | ICD-10-CM

## 2017-12-05 DIAGNOSIS — R10.9 ABDOMINAL PAIN: ICD-10-CM

## 2017-12-05 DIAGNOSIS — K59.00 CONSTIPATION, UNSPECIFIED CONSTIPATION TYPE: ICD-10-CM

## 2017-12-05 LAB
ALBUMIN SERPL BCP-MCNC: 3.3 G/DL
ALP SERPL-CCNC: 95 U/L
ALT SERPL W/O P-5'-P-CCNC: 16 U/L
ANION GAP SERPL CALC-SCNC: 7 MMOL/L
AST SERPL-CCNC: 17 U/L
BACTERIA GENITAL QL WET PREP: ABNORMAL
BASOPHILS # BLD AUTO: 0.02 K/UL
BASOPHILS NFR BLD: 0.4 %
BILIRUB SERPL-MCNC: 0.5 MG/DL
BILIRUB UR QL STRIP: NEGATIVE
BUN SERPL-MCNC: 13 MG/DL
C TRACH DNA SPEC QL NAA+PROBE: NOT DETECTED
CALCIUM SERPL-MCNC: 9 MG/DL
CHLORIDE SERPL-SCNC: 107 MMOL/L
CLARITY UR: CLEAR
CLUE CELLS VAG QL WET PREP: ABNORMAL
CO2 SERPL-SCNC: 28 MMOL/L
COLOR UR: YELLOW
CREAT SERPL-MCNC: 1.2 MG/DL
DIFFERENTIAL METHOD: ABNORMAL
EOSINOPHIL # BLD AUTO: 0.2 K/UL
EOSINOPHIL NFR BLD: 3.5 %
ERYTHROCYTE [DISTWIDTH] IN BLOOD BY AUTOMATED COUNT: 13.1 %
EST. GFR  (AFRICAN AMERICAN): >60 ML/MIN/1.73 M^2
EST. GFR  (NON AFRICAN AMERICAN): 54 ML/MIN/1.73 M^2
FILAMENT FUNGI VAG WET PREP-#/AREA: ABNORMAL
GLUCOSE SERPL-MCNC: 83 MG/DL
GLUCOSE UR QL STRIP: NEGATIVE
HCT VFR BLD AUTO: 39.8 %
HGB BLD-MCNC: 12.7 G/DL
HGB UR QL STRIP: ABNORMAL
KETONES UR QL STRIP: NEGATIVE
LEUKOCYTE ESTERASE UR QL STRIP: NEGATIVE
LIPASE SERPL-CCNC: 85 U/L
LYMPHOCYTES # BLD AUTO: 2.2 K/UL
LYMPHOCYTES NFR BLD: 46.3 %
MCH RBC QN AUTO: 28.5 PG
MCHC RBC AUTO-ENTMCNC: 31.9 G/DL
MCV RBC AUTO: 89 FL
MONOCYTES # BLD AUTO: 0.4 K/UL
MONOCYTES NFR BLD: 8.6 %
N GONORRHOEA DNA SPEC QL NAA+PROBE: NOT DETECTED
NEUTROPHILS # BLD AUTO: 2 K/UL
NEUTROPHILS NFR BLD: 41 %
NITRITE UR QL STRIP: NEGATIVE
PH UR STRIP: 7 [PH] (ref 5–8)
PLATELET # BLD AUTO: 248 K/UL
PMV BLD AUTO: 9.8 FL
POTASSIUM SERPL-SCNC: 4.3 MMOL/L
PROT SERPL-MCNC: 7.2 G/DL
PROT UR QL STRIP: NEGATIVE
RBC # BLD AUTO: 4.45 M/UL
SODIUM SERPL-SCNC: 142 MMOL/L
SP GR UR STRIP: 1.01 (ref 1–1.03)
SPECIMEN SOURCE: ABNORMAL
T VAGINALIS GENITAL QL WET PREP: ABNORMAL
URN SPEC COLLECT METH UR: ABNORMAL
UROBILINOGEN UR STRIP-ACNC: 1 EU/DL
WBC # BLD AUTO: 4.79 K/UL
WBC #/AREA VAG WET PREP: ABNORMAL
YEAST GENITAL QL WET PREP: ABNORMAL

## 2017-12-05 PROCEDURE — 80053 COMPREHEN METABOLIC PANEL: CPT

## 2017-12-05 PROCEDURE — 87591 N.GONORRHOEAE DNA AMP PROB: CPT

## 2017-12-05 PROCEDURE — 81003 URINALYSIS AUTO W/O SCOPE: CPT

## 2017-12-05 PROCEDURE — 99285 EMERGENCY DEPT VISIT HI MDM: CPT

## 2017-12-05 PROCEDURE — 87210 SMEAR WET MOUNT SALINE/INK: CPT

## 2017-12-05 PROCEDURE — 25000003 PHARM REV CODE 250: Performed by: EMERGENCY MEDICINE

## 2017-12-05 PROCEDURE — 85025 COMPLETE CBC W/AUTO DIFF WBC: CPT

## 2017-12-05 PROCEDURE — 83690 ASSAY OF LIPASE: CPT

## 2017-12-05 RX ORDER — METRONIDAZOLE 7.5 MG/G
GEL TOPICAL 2 TIMES DAILY
Qty: 45 G | Refills: 0 | Status: SHIPPED | OUTPATIENT
Start: 2017-12-05 | End: 2018-08-16

## 2017-12-05 RX ORDER — LACTULOSE 10 G/15ML
20 SOLUTION ORAL 3 TIMES DAILY PRN
Qty: 900 ML | Refills: 0 | Status: SHIPPED | OUTPATIENT
Start: 2017-12-05 | End: 2017-12-15

## 2017-12-05 RX ORDER — OXYCODONE AND ACETAMINOPHEN 10; 325 MG/1; MG/1
1 TABLET ORAL EVERY 4 HOURS PRN
Qty: 18 TABLET | Refills: 0 | Status: SHIPPED | OUTPATIENT
Start: 2017-12-05 | End: 2018-03-03 | Stop reason: CLARIF

## 2017-12-05 RX ORDER — ONDANSETRON 4 MG/1
4 TABLET, ORALLY DISINTEGRATING ORAL EVERY 8 HOURS PRN
Qty: 30 TABLET | Refills: 0 | Status: SHIPPED | OUTPATIENT
Start: 2017-12-05 | End: 2018-05-10 | Stop reason: ALTCHOICE

## 2017-12-05 RX ADMIN — LIDOCAINE HYDROCHLORIDE: 20 SOLUTION ORAL; TOPICAL at 10:12

## 2017-12-05 NOTE — ED PROVIDER NOTES
Encounter Date: 12/5/2017       History     Chief Complaint   Patient presents with    Female  Problem     Patient has been having off/on pelvic pain with yellow thick vaginal discharge and nausea and vomiting.  Had hysteroctomy in September     Patient has seen PCP and was given bactrim for UTI and miralax which did not help symptoms.  Has not been seen by GI previously.          Abdominal Pain   Illness onset: Several months ago.   The onset of the illness was gradual. The problem has not changed since onset.The abdominal pain is generalized. The abdominal pain does not radiate. The severity of the abdominal pain is 6/10. The abdominal pain is relieved by nothing. The other symptoms of the illness include nausea and vomiting. The other symptoms of the illness do not include fever, fatigue, melena, shortness of breath, diarrhea, dysuria, hematemesis, hematochezia, vaginal discharge or vaginal bleeding.   Onset: Every night.    The patient states that she believes she is currently not pregnant. The patient has had a change in bowel habit. Additional symptoms associated with the illness include constipation, urgency and frequency. Symptoms associated with the illness do not include back pain.     Review of patient's allergies indicates:   Allergen Reactions    Corticosteroids (glucocorticoids) Swelling     Past Medical History:   Diagnosis Date    Abnormal Pap smear of cervix     5 years ago    Hypertension      Past Surgical History:   Procedure Laterality Date    HYSTERECTOMY      TONSILLECTOMY, ADENOIDECTOMY  06/2017    TUBAL LIGATION       No family history on file.  Social History   Substance Use Topics    Smoking status: Never Smoker    Smokeless tobacco: Never Used    Alcohol use No     Review of Systems   Constitutional: Negative for fatigue and fever.   HENT: Negative for sore throat.    Respiratory: Negative for shortness of breath.    Cardiovascular: Negative for chest pain.   Gastrointestinal:  Positive for abdominal pain, constipation, nausea and vomiting. Negative for diarrhea, hematemesis, hematochezia and melena.   Genitourinary: Positive for frequency and urgency. Negative for dysuria, vaginal bleeding and vaginal discharge.   Musculoskeletal: Negative for back pain.   Skin: Negative for rash.   Neurological: Negative for weakness.   Hematological: Does not bruise/bleed easily.   All other systems reviewed and are negative.      Physical Exam     Initial Vitals [12/05/17 1017]   BP Pulse Resp Temp SpO2   129/71 78 16 98 °F (36.7 °C) 96 %      MAP       90.33         Physical Exam    Nursing note and vitals reviewed.  Constitutional: She appears well-developed and well-nourished.   HENT:   Head: Normocephalic and atraumatic.   Eyes: Conjunctivae and EOM are normal. Pupils are equal, round, and reactive to light. Right eye exhibits no discharge. Left eye exhibits no discharge. No scleral icterus.   Neck: Normal range of motion. Neck supple.   Cardiovascular: Normal rate, regular rhythm and normal heart sounds. Exam reveals no gallop and no friction rub.    No murmur heard.  Pulmonary/Chest: Breath sounds normal. No stridor. No respiratory distress. She has no wheezes. She has no rhonchi. She has no rales.   Abdominal: Soft. Bowel sounds are normal. She exhibits no distension and no mass. There is no tenderness. There is no rebound and no guarding.   Genitourinary: Vagina normal.   Genitourinary Comments: Vaginal cuff without tenderness, moderate discharge    Neurological: She is alert and oriented to person, place, and time. She has normal strength. No cranial nerve deficit or sensory deficit.   Skin: Skin is warm and dry. Capillary refill takes less than 2 seconds.   Psychiatric: She has a normal mood and affect. Her behavior is normal. Judgment and thought content normal.         ED Course   Procedures  Labs Reviewed   CBC W/ AUTO DIFFERENTIAL - Abnormal; Notable for the following:        Result Value     MCHC 31.9 (*)     All other components within normal limits   COMPREHENSIVE METABOLIC PANEL - Abnormal; Notable for the following:     Albumin 3.3 (*)     Anion Gap 7 (*)     eGFR if non  54 (*)     All other components within normal limits   LIPASE - Abnormal; Notable for the following:     Lipase 85 (*)     All other components within normal limits   URINALYSIS - Abnormal; Notable for the following:     Occult Blood UA Trace (*)     All other components within normal limits   VAGINAL SCREEN - Abnormal; Notable for the following:     WBC - Vaginal Screen Rare (*)     Bacteria - Vaginal Screen Rare (*)     All other components within normal limits   C. TRACHOMATIS/N. GONORRHOEAE BY AMP DNA        US Abdomen Limited (Gallbladder)   Final Result         Cholelithiasis without convincing sonographic evidence of acute cholecystitis.      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         Electronically signed by: AMANDA ALCANTARA MD, MD   Date:     12/05/17   Time:    13:08       X-Ray Abdomen AP 1 View (KUB)   Final Result         1. Bilateral renal calculi.   2. No bowel obstruction or perforation.            Electronically signed by: ANN WHIPPLE MD   Date:     12/05/17   Time:    11:20              Medical Decision Making:   Differential Diagnosis:   Vaginitis, GERD, intestinal spasm, gastroenteritis, gastritis, ulcer, cholecystitis, gallstones, pancreatitis, ileus, small bowel obstruction, appendicitis, constipation, intestinal gas pain.    Clinical Tests:   Lab Tests: Ordered and Reviewed  Radiological Study: Ordered and Reviewed  ED Management:  Patient improved with treatment in the emergency department and comfortable going home. Discussed reasons to return and importance of followup.  Patient understands that the emergency visit today is primarily to address immediate concerns and to rule out emergent cause of symptoms and that they may require further workup and evaluation as an outpatient.  All questions addressed and patient given discharge instructions and followup information.                      ED Course      Clinical Impression:   The primary encounter diagnosis was Biliary colic. Diagnoses of Abdominal pain and Constipation, unspecified constipation type were also pertinent to this visit.    Disposition:   Disposition: Discharged  Condition: Stable                        Lorelei Salmeron MD  12/13/17 0392

## 2017-12-05 NOTE — ED NOTES
Pt reports abdominal pain, nausea/vomiting/constipation x 2 mos with vaginal discharge x 2 mos.  Had hysterectomy in September and symptoms began post op.  Denies fever.

## 2017-12-13 NOTE — ED NOTES
Pt successfully completed PO challenge. Provider informed   
Pt vomiting clear green emesis  
Report received from NATHANIEL Alex. Assumed care of pt, resting in no acute distress. Will continue to monitor.   
Mother

## 2018-03-03 ENCOUNTER — HOSPITAL ENCOUNTER (EMERGENCY)
Facility: HOSPITAL | Age: 48
Discharge: HOME | End: 2018-03-03
Attending: EMERGENCY MEDICINE
Payer: MEDICAID

## 2018-03-03 VITALS
BODY MASS INDEX: 31.89 KG/M2 | DIASTOLIC BLOOD PRESSURE: 71 MMHG | RESPIRATION RATE: 20 BRPM | HEART RATE: 88 BPM | HEIGHT: 63 IN | WEIGHT: 180 LBS | OXYGEN SATURATION: 98 % | TEMPERATURE: 98 F | SYSTOLIC BLOOD PRESSURE: 128 MMHG

## 2018-03-03 DIAGNOSIS — S92.525A CLOSED NONDISPLACED FRACTURE OF MIDDLE PHALANX OF LESSER TOE OF LEFT FOOT, INITIAL ENCOUNTER: Primary | ICD-10-CM

## 2018-03-03 DIAGNOSIS — M79.672 FOOT PAIN, LEFT: ICD-10-CM

## 2018-03-03 PROCEDURE — 63600175 PHARM REV CODE 636 W HCPCS: Performed by: EMERGENCY MEDICINE

## 2018-03-03 PROCEDURE — 99283 EMERGENCY DEPT VISIT LOW MDM: CPT | Mod: 25

## 2018-03-03 PROCEDURE — 96372 THER/PROPH/DIAG INJ SC/IM: CPT

## 2018-03-03 RX ORDER — HYDROCODONE BITARTRATE AND ACETAMINOPHEN 5; 325 MG/1; MG/1
1 TABLET ORAL EVERY 4 HOURS PRN
Qty: 12 TABLET | Refills: 0 | Status: SHIPPED | OUTPATIENT
Start: 2018-03-03 | End: 2018-05-08

## 2018-03-03 RX ORDER — KETOROLAC TROMETHAMINE 10 MG/1
10 TABLET, FILM COATED ORAL EVERY 6 HOURS
Qty: 20 TABLET | Refills: 0 | Status: SHIPPED | OUTPATIENT
Start: 2018-03-03 | End: 2018-03-03

## 2018-03-03 RX ORDER — KETOROLAC TROMETHAMINE 10 MG/1
10 TABLET, FILM COATED ORAL EVERY 6 HOURS
Qty: 20 TABLET | Refills: 0 | Status: SHIPPED | OUTPATIENT
Start: 2018-03-03 | End: 2018-03-08

## 2018-03-03 RX ORDER — KETOROLAC TROMETHAMINE 30 MG/ML
60 INJECTION, SOLUTION INTRAMUSCULAR; INTRAVENOUS
Status: COMPLETED | OUTPATIENT
Start: 2018-03-03 | End: 2018-03-03

## 2018-03-03 RX ADMIN — KETOROLAC TROMETHAMINE 60 MG: 30 INJECTION, SOLUTION INTRAMUSCULAR at 09:03

## 2018-03-04 NOTE — ED PROVIDER NOTES
Encounter Date: 3/3/2018    SCRIBE #1 NOTE: I, Shira Caceres, am scribing for, and in the presence of,  Dr. Huizar. I have scribed the entire note.       History     Chief Complaint   Patient presents with    Foot Pain     Reports Old TV weighing approx 70 pound fell on L foot just PTA. Complaining of generalized pain to extremity but mostly to 2nd and 3rd toes. No swelling or deformity noted.      9:00 PM    This is a 47 y.o female that presents to the ED with complaint of left foot pain. The onset began PTA. Patient reports of an old TV weighing approximately 70 lbs fell on her foot. Patient associate 2nd and 3rd toe pain of injured foot but denies of knee pain, swelling, redness, weakness, numbness, or tingling sensation. The pain is exacerbated with movement and when palpated. No pre-hospital treatments were attempted. Patient confirms Hx of kidney stones, gallstones, and HTN but denies of heart disease. There are no further injuries except as noted.      The history is provided by the patient.     Review of patient's allergies indicates:   Allergen Reactions    Corticosteroids (glucocorticoids) Swelling     Past Medical History:   Diagnosis Date    Abnormal Pap smear of cervix     5 years ago    Hypertension      Past Surgical History:   Procedure Laterality Date    HYSTERECTOMY      TONSILLECTOMY, ADENOIDECTOMY  06/2017    TUBAL LIGATION       History reviewed. No pertinent family history.  Social History   Substance Use Topics    Smoking status: Never Smoker    Smokeless tobacco: Never Used    Alcohol use No     Review of Systems   Constitutional: Negative for activity change.   Musculoskeletal: Negative for gait problem and joint swelling.        Left foot pain with 2nd and 3rd toe pain. No knee pain.    Skin: Negative for color change (No redness.).        No swelling.    Neurological: Negative for dizziness, weakness, light-headedness and numbness.        No tingling sensation.     Psychiatric/Behavioral: Negative for confusion.       Physical Exam     Initial Vitals [03/03/18 1936]   BP Pulse Resp Temp SpO2   (!) 163/98 98 20 98.2 °F (36.8 °C) 99 %      MAP       119.67         Physical Exam    Nursing note and vitals reviewed.  Constitutional: She appears well-developed and well-nourished. She is not diaphoretic. No distress.   Musculoskeletal: Normal range of motion. She exhibits tenderness. She exhibits no edema.   Pelvis is stable. Tenderness over left midfoot and left toe. Good motor function of ankle and knee.    Neurological: She is alert and oriented to person, place, and time. She has normal strength.         ED Course   Procedures  Labs Reviewed - No data to display       Imaging Results          X-Ray Foot Complete Left (Final result)  Result time 03/03/18 20:18:16    Final result by Amanda Collado MD (03/03/18 20:18:16)                 Impression:        Distal second proximal phalanx acute fracture, as above.      Electronically signed by: AMANDA COLLADO MD, MD  Date:     03/03/18  Time:    20:18              Narrative:    COMPARISON: None    FINDINGS: 3 views left foot.        Bones are well mineralized. There is acute transverse fracture through the distal metaphysis of the second proximal phalanx with slight displacement of the distal fragment towards the dorsal aspect.  No dislocation or destructive osseous process identified.  Minimal degenerative change at the first MTP joint.  Small plantar calcaneal spur and enthesophyte at the Achilles insertion site. No subcutaneous emphysema or radiodense retained foreign body.                                X-Rays:   Independently Interpreted Readings:   Other Readings:  Foot Complete Left:Distol second proximal pharlanx fracture.     Medical Decision Making:   Independently Interpreted Test(s):   I have ordered and independently interpreted X-rays - see prior notes.  Clinical Tests:   Radiological Study: Ordered and Reviewed                       Clinical Impression:   No diagnosis found.    Disposition:   Disposition: Discharged  Condition: Stable       Scribe Attestation***

## 2018-03-04 NOTE — ED NOTES
Patient identifiers for Lorelei Norris checked and correct.  LOC: The patient is awake, alert and aware of environment with an appropriate affect, the patient is oriented x 3 and speaking appropriately.  APPEARANCE: Tearful. Patient uncomfortable and in no acute distress, patient is clean and well groomed, patient's clothing are properly fastened.  SKIN: The skin is warm and dry, patient has normal skin turgor and moist mucus membranes. Swelling to left great toe and 2nd toe.  MUSKULOSKELETAL: + pedal pulse.

## 2018-03-04 NOTE — ED PROVIDER NOTES
Encounter Date: 3/3/2018       History     Chief Complaint   Patient presents with    Foot Pain     Reports Old TV weighing approx 70 pound fell on L foot just PTA. Complaining of generalized pain to extremity but mostly to 2nd and 3rd toes. No swelling or deformity noted.      HPI  Review of patient's allergies indicates:   Allergen Reactions    Corticosteroids (glucocorticoids) Swelling     Past Medical History:   Diagnosis Date    Abnormal Pap smear of cervix     5 years ago    Hypertension      Past Surgical History:   Procedure Laterality Date    HYSTERECTOMY      TONSILLECTOMY, ADENOIDECTOMY  06/2017    TUBAL LIGATION       History reviewed. No pertinent family history.  Social History   Substance Use Topics    Smoking status: Never Smoker    Smokeless tobacco: Never Used    Alcohol use No     Review of Systems    Physical Exam     Initial Vitals [03/03/18 1936]   BP Pulse Resp Temp SpO2   (!) 163/98 98 20 98.2 °F (36.8 °C) 99 %      MAP       119.67         Physical Exam    ED Course   Procedures  Labs Reviewed - No data to display                            ED Course as of Mar 20 0949   Sat Mar 03, 2018   2130 Uncomplicated course in the emergency department.  Patient educated as to the nature of her fracture.  Walking boot measured and applied without difficulty.  Analgesia with Toradol; home medications to include Toradol as well as a few Norco for severe pain.  Rest elevation ice etc. explained and patient verbalized understanding of same.  [KJ]      ED Course User Index  [KJ] Radhames Huizar MD     Clinical Impression:   The primary encounter diagnosis was Closed nondisplaced fracture of middle phalanx of lesser toe of left foot, initial encounter. A diagnosis of Foot pain, left was also pertinent to this visit.                           Radhames Huizar MD  03/20/18 0982

## 2018-05-08 ENCOUNTER — HOSPITAL ENCOUNTER (EMERGENCY)
Facility: HOSPITAL | Age: 48
Discharge: HOME | End: 2018-05-08
Attending: EMERGENCY MEDICINE
Payer: MEDICAID

## 2018-05-08 VITALS
WEIGHT: 180 LBS | HEIGHT: 64 IN | BODY MASS INDEX: 30.73 KG/M2 | HEART RATE: 80 BPM | DIASTOLIC BLOOD PRESSURE: 76 MMHG | RESPIRATION RATE: 20 BRPM | SYSTOLIC BLOOD PRESSURE: 130 MMHG | OXYGEN SATURATION: 97 % | TEMPERATURE: 99 F

## 2018-05-08 DIAGNOSIS — S92.502G: ICD-10-CM

## 2018-05-08 DIAGNOSIS — T14.8XXA FRACTURE: Primary | ICD-10-CM

## 2018-05-08 DIAGNOSIS — H60.501 ACUTE OTITIS EXTERNA OF RIGHT EAR, UNSPECIFIED TYPE: ICD-10-CM

## 2018-05-08 PROBLEM — K81.2 ACUTE ON CHRONIC CHOLECYSTITIS: Status: ACTIVE | Noted: 2018-05-08

## 2018-05-08 PROBLEM — I82.432 ACUTE DEEP VEIN THROMBOSIS (DVT) OF POPLITEAL VEIN OF LEFT LOWER EXTREMITY: Status: ACTIVE | Noted: 2018-05-08

## 2018-05-08 PROCEDURE — 99283 EMERGENCY DEPT VISIT LOW MDM: CPT | Mod: 25

## 2018-05-08 RX ORDER — OFLOXACIN 3 MG/ML
3 SOLUTION AURICULAR (OTIC) 2 TIMES DAILY
Qty: 10 ML | Refills: 0 | Status: SHIPPED | OUTPATIENT
Start: 2018-05-08 | End: 2018-05-23

## 2018-05-08 RX ORDER — ACETAMINOPHEN 500 MG
1000 TABLET ORAL
Status: DISCONTINUED | OUTPATIENT
Start: 2018-05-08 | End: 2018-05-08 | Stop reason: HOSPADM

## 2018-05-08 NOTE — DISCHARGE INSTRUCTIONS
Your fracture doesn't appear to be healing as well as would be expected, this explains why you continue to have pain. I would recommend that you not weight bear, use the crutches. Go to [facility], or call to schedule an earlier outpatient appointment.

## 2018-05-08 NOTE — ED PROVIDER NOTES
Encounter Date: 5/8/2018    SCRIBE #1 NOTE: I, Prosper Muhammad, am scribing for, and in the presence of, Dr. Syed.       History     Chief Complaint   Patient presents with    Foot Pain     Pt broke her left toe in March and states it won't heal. States she rebroke it in April and again a couple of days ago by hitting it on edge of the bed. Pt has walking boot on. Pt states she is here to recheck it.      Lorelei Norris is a 48 y.o. female who  has a past medical history of Abnormal Pap smear of cervix and Hypertension.    The patient presents to the ED due to left foot pain secondary to a broken left 2nd toe since March 2018. Patient originally broke her toes by striking her foot against her bead. She then refractured her toes in April. Patient has an appointment with a doctor at [facility] in [place] in 1 month, but wanted to get her toes rechecked before then. She also notes a blood clot in her left lower leg, for which she is taking Xarelto. Patient also notes right ear pain.      The history is provided by the patient.     Review of patient's allergies indicates:   Allergen Reactions    Corticosteroids (glucocorticoids) Swelling     Past Medical History:   Diagnosis Date    Abnormal Pap smear of cervix     5 years ago    Hypertension      Past Surgical History:   Procedure Laterality Date    HYSTERECTOMY      TONSILLECTOMY, ADENOIDECTOMY  06/2017    TUBAL LIGATION       No family history on file.  Social History   Substance Use Topics    Smoking status: Never Smoker    Smokeless tobacco: Never Used    Alcohol use No     Review of Systems   HENT: Positive for ear pain.    Musculoskeletal:        Left foot pain       Physical Exam     Initial Vitals [05/08/18 0922]   BP Pulse Resp Temp SpO2   139/74 82 20 98.6 °F (37 °C) 97 %      MAP       95.67         Physical Exam    Nursing note and vitals reviewed.  Constitutional: She appears well-developed and well-nourished.   HENT:   Head: Normocephalic and atraumatic.    Pre-auricular tenderness and slight swelling. Mild swelling to external canal. Tender to palpation.   Eyes: Conjunctivae are normal.   Neck: Neck supple.   Cardiovascular: Normal rate, regular rhythm, normal heart sounds and intact distal pulses.   Pulmonary/Chest: Breath sounds normal. She has no wheezes. She has no rhonchi. She has no rales.   Abdominal: Soft. She exhibits no distension. There is no tenderness.   Musculoskeletal: Normal range of motion.   Mild soft tissue swelling to left foot dorsum  Tenderness to palpation to proximal 2nd and 3rd toes  No deformity, distal neurovascular intact   Neurological: She is alert and oriented to person, place, and time.   Skin: No rash noted. No erythema.   Psychiatric: She has a normal mood and affect.         ED Course   Procedures  Labs Reviewed - No data to display     Imaging Results          X-Ray Foot Complete Left (Final result)  Result time 05/08/18 10:28:28    Final result by Eyad Oconnell DO (05/08/18 10:28:28)                 Impression:      Please see above      Electronically signed by: Eyad Oconnell DO  Date:    05/08/2018  Time:    10:28             Narrative:    EXAMINATION:  XR FOOT COMPLETE 3 VIEW LEFT    CLINICAL HISTORY:  .  Other injury of unspecified body region, initial encounter    TECHNIQUE:  AP, lateral and oblique views of the left foot were performed.    COMPARISON:  03/03/2018    FINDINGS:  Comminuted impacted fracture deformity of the distal aspect of the 2nd proximal phalanx best seen on the oblique view with medial positioning of the distal phalanx with respect to the shaft similar to prior.  No significant overlying callus formation.  Mild degenerative changes of the 1st MTP and interphalangeal joints diffusely.  Similar to prior.  No focal bony erosion.  Small posterior and inferior calcaneal spurs again identified.  Clinical correlation and continued follow-up advised                                   Medical Decision Making:    History:   Old Medical Records: I decided to obtain old medical records.  Clinical Tests:   Radiological Study: Ordered and Reviewed   medical.decision making: There is no callus formation on the patient's x-ray which indicates it is not healing correctly this is fracture that's over 2 months old she continues to have pain she's been weightbearing with the cast shoe I've recommended that she use crutches and not be weight-bearing she has a follow-up appointment at [facility] for this recommended that she try to make that sooner, over-the-counter Tylenol or ibuprofen, and she has clinical evidence of a right external otitis and prescribed Cipro drops for her.                    Clinical Impression:   The primary encounter diagnosis was Fracture. Diagnoses of Acute otitis externa of right ear, unspecified type and Closed fracture of phalanx of left second toe with delayed healing were also pertinent to this visit.    Disposition:   Disposition: Discharged  Condition: Stable    I, Dr. Bill Syed, personally performed the services described in this documentation.   All medical record entries made by the scribe were at my direction and in my presence.   I have reviewed the chart and agree that the record is accurate and complete.   Bill Syed MD.  3:18 PM 05/08/2018                  Bill Syed MD  05/08/18 5541

## 2018-05-10 ENCOUNTER — HOSPITAL ENCOUNTER (EMERGENCY)
Facility: HOSPITAL | Age: 48
Discharge: HOME | End: 2018-05-10
Attending: EMERGENCY MEDICINE
Payer: MEDICAID

## 2018-05-10 VITALS
RESPIRATION RATE: 18 BRPM | TEMPERATURE: 98 F | OXYGEN SATURATION: 98 % | HEIGHT: 64 IN | SYSTOLIC BLOOD PRESSURE: 134 MMHG | WEIGHT: 180 LBS | DIASTOLIC BLOOD PRESSURE: 76 MMHG | BODY MASS INDEX: 30.73 KG/M2 | HEART RATE: 69 BPM

## 2018-05-10 DIAGNOSIS — K80.20 CALCULUS OF GALLBLADDER WITHOUT CHOLECYSTITIS WITHOUT OBSTRUCTION: ICD-10-CM

## 2018-05-10 DIAGNOSIS — R10.9 ABDOMINAL PAIN, UNSPECIFIED ABDOMINAL LOCATION: Primary | ICD-10-CM

## 2018-05-10 LAB
ALBUMIN SERPL BCP-MCNC: 3.1 G/DL
ALP SERPL-CCNC: 82 U/L
ALT SERPL W/O P-5'-P-CCNC: 13 U/L
ANION GAP SERPL CALC-SCNC: 4 MMOL/L
AST SERPL-CCNC: 13 U/L
BASOPHILS # BLD AUTO: 0.02 K/UL
BASOPHILS NFR BLD: 0.3 %
BILIRUB SERPL-MCNC: 0.3 MG/DL
BILIRUB UR QL STRIP: NEGATIVE
BILIRUB UR QL STRIP: NEGATIVE
BUN SERPL-MCNC: 12 MG/DL
CALCIUM SERPL-MCNC: 9.1 MG/DL
CHLORIDE SERPL-SCNC: 103 MMOL/L
CLARITY UR: CLEAR
CLARITY UR: CLEAR
CO2 SERPL-SCNC: 34 MMOL/L
COLOR UR: YELLOW
COLOR UR: YELLOW
CREAT SERPL-MCNC: 1.1 MG/DL
DIFFERENTIAL METHOD: NORMAL
EOSINOPHIL # BLD AUTO: 0.1 K/UL
EOSINOPHIL NFR BLD: 1 %
ERYTHROCYTE [DISTWIDTH] IN BLOOD BY AUTOMATED COUNT: 12.4 %
EST. GFR  (AFRICAN AMERICAN): >60 ML/MIN/1.73 M^2
EST. GFR  (NON AFRICAN AMERICAN): 59 ML/MIN/1.73 M^2
GLUCOSE SERPL-MCNC: 117 MG/DL
GLUCOSE UR QL STRIP: NEGATIVE
GLUCOSE UR QL STRIP: NEGATIVE
HCT VFR BLD AUTO: 39.3 %
HGB BLD-MCNC: 12.9 G/DL
HGB UR QL STRIP: NEGATIVE
HGB UR QL STRIP: NEGATIVE
KETONES UR QL STRIP: NEGATIVE
KETONES UR QL STRIP: NEGATIVE
LEUKOCYTE ESTERASE UR QL STRIP: NEGATIVE
LEUKOCYTE ESTERASE UR QL STRIP: NEGATIVE
LIPASE SERPL-CCNC: 42 U/L
LYMPHOCYTES # BLD AUTO: 1.8 K/UL
LYMPHOCYTES NFR BLD: 26.1 %
MCH RBC QN AUTO: 28.8 PG
MCHC RBC AUTO-ENTMCNC: 32.8 G/DL
MCV RBC AUTO: 88 FL
MONOCYTES # BLD AUTO: 0.4 K/UL
MONOCYTES NFR BLD: 6.4 %
NEUTROPHILS # BLD AUTO: 4.5 K/UL
NEUTROPHILS NFR BLD: 66.1 %
NITRITE UR QL STRIP: NEGATIVE
NITRITE UR QL STRIP: NEGATIVE
PH UR STRIP: 7 [PH] (ref 5–8)
PH UR STRIP: 7 [PH] (ref 5–8)
PLATELET # BLD AUTO: 262 K/UL
PMV BLD AUTO: 10 FL
POTASSIUM SERPL-SCNC: 3.9 MMOL/L
PROT SERPL-MCNC: 6.7 G/DL
PROT UR QL STRIP: NEGATIVE
PROT UR QL STRIP: NEGATIVE
RBC # BLD AUTO: 4.48 M/UL
SODIUM SERPL-SCNC: 141 MMOL/L
SP GR UR STRIP: 1.01 (ref 1–1.03)
SP GR UR STRIP: 1.01 (ref 1–1.03)
URN SPEC COLLECT METH UR: NORMAL
URN SPEC COLLECT METH UR: NORMAL
UROBILINOGEN UR STRIP-ACNC: NEGATIVE EU/DL
UROBILINOGEN UR STRIP-ACNC: NEGATIVE EU/DL
WBC # BLD AUTO: 6.64 K/UL

## 2018-05-10 PROCEDURE — 81003 URINALYSIS AUTO W/O SCOPE: CPT

## 2018-05-10 PROCEDURE — C9113 INJ PANTOPRAZOLE SODIUM, VIA: HCPCS | Performed by: EMERGENCY MEDICINE

## 2018-05-10 PROCEDURE — 25000003 PHARM REV CODE 250: Performed by: EMERGENCY MEDICINE

## 2018-05-10 PROCEDURE — 25000003 PHARM REV CODE 250: Performed by: PHYSICIAN ASSISTANT

## 2018-05-10 PROCEDURE — 96374 THER/PROPH/DIAG INJ IV PUSH: CPT | Mod: 59

## 2018-05-10 PROCEDURE — 96375 TX/PRO/DX INJ NEW DRUG ADDON: CPT

## 2018-05-10 PROCEDURE — 63600175 PHARM REV CODE 636 W HCPCS: Performed by: PHYSICIAN ASSISTANT

## 2018-05-10 PROCEDURE — 63600175 PHARM REV CODE 636 W HCPCS: Performed by: EMERGENCY MEDICINE

## 2018-05-10 PROCEDURE — 83690 ASSAY OF LIPASE: CPT

## 2018-05-10 PROCEDURE — 85025 COMPLETE CBC W/AUTO DIFF WBC: CPT

## 2018-05-10 PROCEDURE — 80053 COMPREHEN METABOLIC PANEL: CPT

## 2018-05-10 PROCEDURE — 25500020 PHARM REV CODE 255: Performed by: EMERGENCY MEDICINE

## 2018-05-10 PROCEDURE — 99284 EMERGENCY DEPT VISIT MOD MDM: CPT | Mod: 25

## 2018-05-10 RX ORDER — SUCRALFATE 1 G/1
1 TABLET ORAL 4 TIMES DAILY
Qty: 30 TABLET | Refills: 0 | Status: SHIPPED | OUTPATIENT
Start: 2018-05-10 | End: 2018-08-16

## 2018-05-10 RX ORDER — ONDANSETRON 4 MG/1
4 TABLET, ORALLY DISINTEGRATING ORAL EVERY 8 HOURS PRN
Qty: 15 TABLET | Refills: 0 | Status: SHIPPED | OUTPATIENT
Start: 2018-05-10 | End: 2020-01-08

## 2018-05-10 RX ORDER — MORPHINE SULFATE 4 MG/ML
4 INJECTION, SOLUTION INTRAMUSCULAR; INTRAVENOUS
Status: COMPLETED | OUTPATIENT
Start: 2018-05-10 | End: 2018-05-10

## 2018-05-10 RX ORDER — PANTOPRAZOLE SODIUM 40 MG/10ML
40 INJECTION, POWDER, LYOPHILIZED, FOR SOLUTION INTRAVENOUS
Status: DISCONTINUED | OUTPATIENT
Start: 2018-05-10 | End: 2018-05-10

## 2018-05-10 RX ADMIN — LIDOCAINE HYDROCHLORIDE: 20 SOLUTION ORAL; TOPICAL at 05:05

## 2018-05-10 RX ADMIN — DEXTROSE 40 MG: 50 INJECTION, SOLUTION INTRAVENOUS at 05:05

## 2018-05-10 RX ADMIN — IOHEXOL 75 ML: 350 INJECTION, SOLUTION INTRAVENOUS at 03:05

## 2018-05-10 RX ADMIN — MORPHINE SULFATE 4 MG: 4 INJECTION INTRAVENOUS at 02:05

## 2018-05-10 RX ADMIN — SODIUM CHLORIDE 1000 ML: 0.9 INJECTION, SOLUTION INTRAVENOUS at 02:05

## 2018-05-10 NOTE — ED TRIAGE NOTES
Pt states she can't take the pain of her gallstones anymore. Gas had them for 3-4 months. Points to luq and epigastric region to show pain

## 2018-05-18 ENCOUNTER — HOSPITAL ENCOUNTER (OUTPATIENT)
Dept: RADIOLOGY | Facility: HOSPITAL | Age: 48
Discharge: HOME | End: 2018-05-18
Attending: SURGERY
Payer: MEDICAID

## 2018-05-18 DIAGNOSIS — K81.9 CHOLECYSTITIS: ICD-10-CM

## 2018-05-18 PROCEDURE — 63600175 PHARM REV CODE 636 W HCPCS: Performed by: RADIOLOGY

## 2018-05-18 PROCEDURE — A9537 TC99M MEBROFENIN: HCPCS

## 2018-05-18 PROCEDURE — 78226 HEPATOBILIARY SYSTEM IMAGING: CPT | Mod: 26,,, | Performed by: RADIOLOGY

## 2018-05-18 RX ORDER — MORPHINE SULFATE 4 MG/ML
3 INJECTION, SOLUTION INTRAMUSCULAR; INTRAVENOUS ONCE
Status: COMPLETED | OUTPATIENT
Start: 2018-05-18 | End: 2018-05-18

## 2018-05-18 RX ADMIN — MORPHINE SULFATE 3 MG: 4 INJECTION INTRAVENOUS at 10:05

## 2018-08-16 ENCOUNTER — HOSPITAL ENCOUNTER (EMERGENCY)
Facility: HOSPITAL | Age: 48
Discharge: HOME | End: 2018-08-16
Attending: EMERGENCY MEDICINE
Payer: MEDICAID

## 2018-08-16 ENCOUNTER — DOCUMENTATION ONLY (OUTPATIENT)
Dept: FAMILY MEDICINE | Facility: HOSPITAL | Age: 48
End: 2018-08-16

## 2018-08-16 VITALS
TEMPERATURE: 98 F | HEIGHT: 64 IN | HEART RATE: 76 BPM | RESPIRATION RATE: 16 BRPM | DIASTOLIC BLOOD PRESSURE: 62 MMHG | OXYGEN SATURATION: 100 % | SYSTOLIC BLOOD PRESSURE: 136 MMHG | WEIGHT: 183 LBS | BODY MASS INDEX: 31.24 KG/M2

## 2018-08-16 DIAGNOSIS — K80.20 CALCULUS OF GALLBLADDER WITHOUT CHOLECYSTITIS WITHOUT OBSTRUCTION: ICD-10-CM

## 2018-08-16 DIAGNOSIS — K80.50 BILIARY COLIC: Primary | ICD-10-CM

## 2018-08-16 LAB
ALBUMIN SERPL BCP-MCNC: 3.1 G/DL
ALP SERPL-CCNC: 79 U/L
ALT SERPL W/O P-5'-P-CCNC: 11 U/L
ANION GAP SERPL CALC-SCNC: 7 MMOL/L
AST SERPL-CCNC: 15 U/L
B-HCG UR QL: NEGATIVE
BACTERIA #/AREA URNS HPF: ABNORMAL /HPF
BASOPHILS # BLD AUTO: 0.01 K/UL
BASOPHILS NFR BLD: 0.2 %
BILIRUB SERPL-MCNC: 0.4 MG/DL
BILIRUB UR QL STRIP: NEGATIVE
BUN SERPL-MCNC: 13 MG/DL
CALCIUM SERPL-MCNC: 9.1 MG/DL
CHLORIDE SERPL-SCNC: 108 MMOL/L
CLARITY UR: CLEAR
CO2 SERPL-SCNC: 26 MMOL/L
COLOR UR: YELLOW
CREAT SERPL-MCNC: 1.1 MG/DL
CTP QC/QA: YES
DIFFERENTIAL METHOD: ABNORMAL
EOSINOPHIL # BLD AUTO: 0.2 K/UL
EOSINOPHIL NFR BLD: 3.6 %
ERYTHROCYTE [DISTWIDTH] IN BLOOD BY AUTOMATED COUNT: 13.2 %
EST. GFR  (AFRICAN AMERICAN): >60 ML/MIN/1.73 M^2
EST. GFR  (NON AFRICAN AMERICAN): 59 ML/MIN/1.73 M^2
GLUCOSE SERPL-MCNC: 137 MG/DL
GLUCOSE UR QL STRIP: NEGATIVE
HCT VFR BLD AUTO: 36.5 %
HGB BLD-MCNC: 11.6 G/DL
HGB UR QL STRIP: ABNORMAL
KETONES UR QL STRIP: NEGATIVE
LEUKOCYTE ESTERASE UR QL STRIP: NEGATIVE
LIPASE SERPL-CCNC: 44 U/L
LYMPHOCYTES # BLD AUTO: 2.1 K/UL
LYMPHOCYTES NFR BLD: 47.6 %
MCH RBC QN AUTO: 28.2 PG
MCHC RBC AUTO-ENTMCNC: 31.8 G/DL
MCV RBC AUTO: 89 FL
MICROSCOPIC COMMENT: ABNORMAL
MONOCYTES # BLD AUTO: 0.3 K/UL
MONOCYTES NFR BLD: 7.3 %
NEUTROPHILS # BLD AUTO: 1.9 K/UL
NEUTROPHILS NFR BLD: 41.1 %
NITRITE UR QL STRIP: NEGATIVE
PH UR STRIP: 6 [PH] (ref 5–8)
PLATELET # BLD AUTO: 260 K/UL
PMV BLD AUTO: 10.1 FL
POTASSIUM SERPL-SCNC: 4.3 MMOL/L
PROT SERPL-MCNC: 6.8 G/DL
PROT UR QL STRIP: NEGATIVE
RBC # BLD AUTO: 4.12 M/UL
RBC #/AREA URNS HPF: 50 /HPF (ref 0–4)
SODIUM SERPL-SCNC: 141 MMOL/L
SP GR UR STRIP: 1.02 (ref 1–1.03)
SQUAMOUS #/AREA URNS HPF: 3 /HPF
URN SPEC COLLECT METH UR: ABNORMAL
UROBILINOGEN UR STRIP-ACNC: NEGATIVE EU/DL
WBC # BLD AUTO: 4.5 K/UL
WBC #/AREA URNS HPF: 0 /HPF (ref 0–5)

## 2018-08-16 PROCEDURE — 96374 THER/PROPH/DIAG INJ IV PUSH: CPT

## 2018-08-16 PROCEDURE — 96375 TX/PRO/DX INJ NEW DRUG ADDON: CPT

## 2018-08-16 PROCEDURE — 81000 URINALYSIS NONAUTO W/SCOPE: CPT

## 2018-08-16 PROCEDURE — 96376 TX/PRO/DX INJ SAME DRUG ADON: CPT

## 2018-08-16 PROCEDURE — 85025 COMPLETE CBC W/AUTO DIFF WBC: CPT

## 2018-08-16 PROCEDURE — 96361 HYDRATE IV INFUSION ADD-ON: CPT

## 2018-08-16 PROCEDURE — 25000003 PHARM REV CODE 250: Performed by: EMERGENCY MEDICINE

## 2018-08-16 PROCEDURE — 99285 EMERGENCY DEPT VISIT HI MDM: CPT | Mod: 25

## 2018-08-16 PROCEDURE — 80053 COMPREHEN METABOLIC PANEL: CPT

## 2018-08-16 PROCEDURE — 81025 URINE PREGNANCY TEST: CPT | Performed by: EMERGENCY MEDICINE

## 2018-08-16 PROCEDURE — 83690 ASSAY OF LIPASE: CPT

## 2018-08-16 PROCEDURE — 63600175 PHARM REV CODE 636 W HCPCS: Performed by: EMERGENCY MEDICINE

## 2018-08-16 RX ORDER — HYDROCODONE BITARTRATE AND ACETAMINOPHEN 5; 325 MG/1; MG/1
1 TABLET ORAL EVERY 6 HOURS PRN
Qty: 14 TABLET | Refills: 0 | Status: SHIPPED | OUTPATIENT
Start: 2018-08-16 | End: 2018-08-28

## 2018-08-16 RX ORDER — MORPHINE SULFATE 2 MG/ML
2 INJECTION, SOLUTION INTRAMUSCULAR; INTRAVENOUS
Status: COMPLETED | OUTPATIENT
Start: 2018-08-16 | End: 2018-08-16

## 2018-08-16 RX ORDER — ONDANSETRON 4 MG/1
4 TABLET, FILM COATED ORAL EVERY 6 HOURS
Qty: 12 TABLET | Refills: 0 | Status: SHIPPED | OUTPATIENT
Start: 2018-08-16 | End: 2022-08-05

## 2018-08-16 RX ORDER — MORPHINE SULFATE 4 MG/ML
4 INJECTION, SOLUTION INTRAMUSCULAR; INTRAVENOUS
Status: COMPLETED | OUTPATIENT
Start: 2018-08-16 | End: 2018-08-16

## 2018-08-16 RX ORDER — ONDANSETRON 2 MG/ML
4 INJECTION INTRAMUSCULAR; INTRAVENOUS
Status: COMPLETED | OUTPATIENT
Start: 2018-08-16 | End: 2018-08-16

## 2018-08-16 RX ADMIN — MORPHINE SULFATE 4 MG: 4 INJECTION INTRAVENOUS at 11:08

## 2018-08-16 RX ADMIN — SODIUM CHLORIDE 1000 ML: 0.9 INJECTION, SOLUTION INTRAVENOUS at 11:08

## 2018-08-16 RX ADMIN — ONDANSETRON 4 MG: 2 INJECTION INTRAMUSCULAR; INTRAVENOUS at 11:08

## 2018-08-16 RX ADMIN — MORPHINE SULFATE 2 MG: 2 INJECTION, SOLUTION INTRAMUSCULAR; INTRAVENOUS at 01:08

## 2018-08-16 NOTE — ED PROVIDER NOTES
Encounter Date: 8/16/2018    SCRIBE #1 NOTE: I, Zak Caceres, am scribing for, and in the presence of,  Dr. Lopez. I have scribed the entire note.       History     Chief Complaint   Patient presents with    Abdominal Pain     pt c/o RUQ pain, nausea, vomiting, and frequent constipation for the past few weeks. States symptoms have been gradually worsening. Pt has known gallstones and was scheduled for surgery in July, but surgery was postponed     Lorelei Norris is a 48 y.o. female who  has a past medical history of Abnormal Pap smear of cervix, DVT (deep venous thrombosis), and Hypertension..     The patient presents to the ED due to right sided upper abdominal pain that began 2 weeks ago. She reports history of cholelithiasis. Patient admits to nausea and vomiting but denies any blood in vomit, diarrhea, fever, or recent trauma. She denies any other complaints. Upon patient chart review, patient has PMHx of DVT and has been on Xarelto for last 4 months for a DVT. No CP or dyspnea        The history is provided by the patient.     Review of patient's allergies indicates:   Allergen Reactions    Corticosteroids (glucocorticoids) Swelling     Past Medical History:   Diagnosis Date    Abnormal Pap smear of cervix     5 years ago    DVT (deep venous thrombosis)     left leg    Hypertension      Past Surgical History:   Procedure Laterality Date    HYSTERECTOMY      TONSILLECTOMY, ADENOIDECTOMY  06/2017    TUBAL LIGATION       No family history on file.  Social History     Tobacco Use    Smoking status: Never Smoker    Smokeless tobacco: Never Used   Substance Use Topics    Alcohol use: No    Drug use: No     Review of Systems   Constitutional: Negative for chills and fever.   HENT: Negative for congestion, rhinorrhea and sore throat.    Eyes: Negative for redness and visual disturbance.   Respiratory: Negative for cough, shortness of breath and wheezing.    Cardiovascular: Negative for chest pain and  palpitations.   Gastrointestinal: Positive for abdominal pain, nausea and vomiting. Negative for diarrhea.   Genitourinary: Negative for dysuria and hematuria.   Musculoskeletal: Negative for back pain, myalgias and neck pain.   Skin: Negative for rash.   Neurological: Negative for dizziness, weakness and light-headedness.   Psychiatric/Behavioral: Negative for confusion.   All other systems reviewed and are negative.      Physical Exam     Initial Vitals [08/16/18 1014]   BP Pulse Resp Temp SpO2   131/87 89 20 98.3 °F (36.8 °C) 98 %      MAP       --         Physical Exam    Nursing note and vitals reviewed.  Constitutional: She appears well-developed and well-nourished. No distress.   HENT:   Head: Normocephalic and atraumatic.   Mouth/Throat: Oropharynx is clear and moist.   Eyes: Conjunctivae are normal.   Neck: Normal range of motion. Neck supple.   Cardiovascular: Normal rate, regular rhythm, normal heart sounds and intact distal pulses.   Pulmonary/Chest: No respiratory distress.   Abdominal: Soft. She exhibits no distension. There is tenderness (RUQ). There is guarding. There is no rebound.   Minimal voluntary guarding.   Musculoskeletal: Normal range of motion. She exhibits no edema.   Neurological: She is alert and oriented to person, place, and time. She has normal strength.   Skin: Skin is warm and dry.   Psychiatric: She has a normal mood and affect. Thought content normal.         ED Course   Procedures  Labs Reviewed   CBC W/ AUTO DIFFERENTIAL   COMPREHENSIVE METABOLIC PANEL   LIPASE   URINALYSIS, REFLEX TO URINE CULTURE   POCT URINE PREGNANCY          Imaging Results    None          Medical Decision Making:   Initial Assessment:   49 y/o female presents to the ED due to right sided upper abdominal pain that began 2 weeks.  Clinical Tests:   Lab Tests: Ordered and Reviewed  Radiological Study: Ordered and Reviewed  ED Management:  12:30 PM   Patient complaining of persistent pain. Will administer  additional Morphine. Microscopic hematuria on labs, will obtain CT to rule out any ureteral obstruction. Patient is a poor historian.  1:59 PM   Spoke U general surgery; Dr. Jeffers agrees patient can follow up in outpatient clinic.  Will refer patient to [facility] family medicine to help coordinate her anticoagulation in surgery.  Patient has appeared non-toxic while in ED. Aside from cholelithiasis, NAF on today's imaging. Patient tolerating po intake. Will discharge to home and recommend close follow-up with PCP and General Surgery  15:00  Dr. Jeffers has spoken to patient (via phone) regarding follow-up with Dr. Watt who was the surgeon that had recently seen patient              Attending Attestation:           Physician Attestation for Scribe:  Physician Attestation Statement for Scribe #1: I, Dr. Amaury Lopez, reviewed documentation, as scribed by Zak Caceres in my presence, and it is both accurate and complete.                    Clinical Impression:     1. Biliary colic    2. Calculus of gallbladder without cholecystitis without obstruction                                 Amaury Lopez MD  08/16/18 1405       Amaury Lopez MD  08/16/18 1519

## 2018-08-16 NOTE — ED NOTES
Notified MD that pt states she has a DV history to left leg,denies PE, but is on xarelto for 6 more months.

## 2018-08-16 NOTE — ED NOTES
Pt here c/o RUQ abd pain worse with palpation for last 2 weeks. Daily vomiting,decrease in appetite and constipation. Skin WDL,resp are regular and unlabored. Pt is AAOx4.  at side.

## 2018-08-16 NOTE — PROGRESS NOTES
Patient saw Dr. Watt in May with similar complaints.  He was wanting her to return to his clinic in 2 months in order to coordinate the Xarelto (treating DVT of popliteal vein of LLE) and then for surgical planning.    I discovered her phone number within the Epic system here was incorrect.  Correct number is [phone].  I changed it within Epic system.      She is in moderate pain that does improve with meds and she is willing to follow-up in clinic.  Ultrasound was neg for acute cholecystitis, though positive for cholelithiasis. WBC was normal. Vital signs were normal     Spoke with Dr. Watt.  He agrees pt can follow-up as out-patient for surgical planning.  Arrangements to be made with [facility] clinic to either stop the Xarelto or cover with lovenox and stop for the day prior to surgery.    Pt given Dr. Watt's clinic number: [phone]      Discussed further with Dr. John Jeffers  [facility] Resident on Neuroendocrine Surgery Service  08/16/2018  3:10 PM

## 2018-08-23 DIAGNOSIS — I82.432 ACUTE DEEP VEIN THROMBOSIS (DVT) OF POPLITEAL VEIN OF LEFT LOWER EXTREMITY: Primary | ICD-10-CM

## 2018-08-23 DIAGNOSIS — I10 ESSENTIAL HYPERTENSION: ICD-10-CM

## 2018-08-23 DIAGNOSIS — K81.2 ACUTE ON CHRONIC CHOLECYSTITIS: ICD-10-CM

## 2018-08-23 RX ORDER — ENOXAPARIN SODIUM 100 MG/ML
80 INJECTION SUBCUTANEOUS EVERY 12 HOURS
Qty: 14 SYRINGE | Refills: 0 | Status: SHIPPED | OUTPATIENT
Start: 2018-08-28 | End: 2018-09-14

## 2018-08-23 NOTE — PROGRESS NOTES
Pt notified lovenox 1 mg/kg q 12 hours is prescribed so she can stop xarelto before surgery with Dr. Watt.      She meets with him next week for surgical planning.

## 2018-08-31 ENCOUNTER — HOSPITAL ENCOUNTER (OUTPATIENT)
Facility: HOSPITAL | Age: 48
Discharge: HOME | End: 2018-08-31
Attending: SURGERY | Admitting: SURGERY
Payer: MEDICAID

## 2018-08-31 ENCOUNTER — ANESTHESIA EVENT (OUTPATIENT)
Dept: SURGERY | Facility: HOSPITAL | Age: 48
End: 2018-08-31
Payer: MEDICAID

## 2018-08-31 ENCOUNTER — ANESTHESIA (OUTPATIENT)
Dept: SURGERY | Facility: HOSPITAL | Age: 48
End: 2018-08-31
Payer: MEDICAID

## 2018-08-31 VITALS
BODY MASS INDEX: 31.24 KG/M2 | HEIGHT: 64 IN | WEIGHT: 183 LBS | TEMPERATURE: 98 F | SYSTOLIC BLOOD PRESSURE: 148 MMHG | RESPIRATION RATE: 20 BRPM | DIASTOLIC BLOOD PRESSURE: 72 MMHG | OXYGEN SATURATION: 99 % | HEART RATE: 72 BPM

## 2018-08-31 DIAGNOSIS — K81.2 ACUTE ON CHRONIC CHOLECYSTITIS: ICD-10-CM

## 2018-08-31 DIAGNOSIS — I10 HYPERTENSION: ICD-10-CM

## 2018-08-31 DIAGNOSIS — N84.0 ENDOMETRIAL POLYP: ICD-10-CM

## 2018-08-31 DIAGNOSIS — K80.12 CALCULUS OF GALLBLADDER WITH ACUTE ON CHRONIC CHOLECYSTITIS WITHOUT OBSTRUCTION: ICD-10-CM

## 2018-08-31 DIAGNOSIS — I82.432 ACUTE DEEP VEIN THROMBOSIS (DVT) OF POPLITEAL VEIN OF LEFT LOWER EXTREMITY: ICD-10-CM

## 2018-08-31 DIAGNOSIS — R87.619 ABNORMAL CERVICAL PAPANICOLAOU SMEAR, UNSPECIFIED ABNORMAL PAP FINDING: Primary | ICD-10-CM

## 2018-08-31 DIAGNOSIS — I10 ESSENTIAL HYPERTENSION: ICD-10-CM

## 2018-08-31 LAB
B-HCG UR QL: NEGATIVE
CTP QC/QA: YES

## 2018-08-31 PROCEDURE — 63600175 PHARM REV CODE 636 W HCPCS: Performed by: NURSE ANESTHETIST, CERTIFIED REGISTERED

## 2018-08-31 PROCEDURE — 00790 ANES IPER UPR ABD NOS: CPT | Performed by: SURGERY

## 2018-08-31 PROCEDURE — 63600175 PHARM REV CODE 636 W HCPCS: Performed by: SURGERY

## 2018-08-31 PROCEDURE — S0020 INJECTION, BUPIVICAINE HYDRO: HCPCS | Performed by: SURGERY

## 2018-08-31 PROCEDURE — 93005 ELECTROCARDIOGRAM TRACING: CPT | Mod: 59

## 2018-08-31 PROCEDURE — 27201423 OPTIME MED/SURG SUP & DEVICES STERILE SUPPLY: Performed by: SURGERY

## 2018-08-31 PROCEDURE — 93010 ELECTROCARDIOGRAM REPORT: CPT | Mod: ,,, | Performed by: INTERNAL MEDICINE

## 2018-08-31 PROCEDURE — 88304 TISSUE EXAM BY PATHOLOGIST: CPT | Mod: 26,,, | Performed by: PATHOLOGY

## 2018-08-31 PROCEDURE — 88304 TISSUE EXAM BY PATHOLOGIST: CPT | Performed by: PATHOLOGY

## 2018-08-31 PROCEDURE — 25000003 PHARM REV CODE 250: Performed by: NURSE ANESTHETIST, CERTIFIED REGISTERED

## 2018-08-31 PROCEDURE — 25000003 PHARM REV CODE 250: Performed by: SURGERY

## 2018-08-31 PROCEDURE — 37000009 HC ANESTHESIA EA ADD 15 MINS: Performed by: SURGERY

## 2018-08-31 PROCEDURE — 36000709 HC OR TIME LEV III EA ADD 15 MIN: Performed by: SURGERY

## 2018-08-31 PROCEDURE — 37000008 HC ANESTHESIA 1ST 15 MINUTES: Performed by: SURGERY

## 2018-08-31 PROCEDURE — 71000016 HC POSTOP RECOV ADDL HR: Performed by: SURGERY

## 2018-08-31 PROCEDURE — 51798 US URINE CAPACITY MEASURE: CPT | Performed by: SURGERY

## 2018-08-31 PROCEDURE — 36000708 HC OR TIME LEV III 1ST 15 MIN: Performed by: SURGERY

## 2018-08-31 PROCEDURE — 71000015 HC POSTOP RECOV 1ST HR: Performed by: SURGERY

## 2018-08-31 PROCEDURE — 71000033 HC RECOVERY, INTIAL HOUR: Performed by: SURGERY

## 2018-08-31 RX ORDER — HYDROMORPHONE HYDROCHLORIDE 2 MG/ML
0.5 INJECTION, SOLUTION INTRAMUSCULAR; INTRAVENOUS; SUBCUTANEOUS EVERY 5 MIN PRN
Status: DISCONTINUED | OUTPATIENT
Start: 2018-08-31 | End: 2018-08-31 | Stop reason: HOSPADM

## 2018-08-31 RX ORDER — ACETAMINOPHEN 10 MG/ML
INJECTION, SOLUTION INTRAVENOUS
Status: DISCONTINUED | OUTPATIENT
Start: 2018-08-31 | End: 2018-08-31

## 2018-08-31 RX ORDER — SODIUM CHLORIDE 0.9 % (FLUSH) 0.9 %
3 SYRINGE (ML) INJECTION
Status: DISCONTINUED | OUTPATIENT
Start: 2018-08-31 | End: 2018-08-31 | Stop reason: HOSPADM

## 2018-08-31 RX ORDER — PROPOFOL 10 MG/ML
VIAL (ML) INTRAVENOUS
Status: DISCONTINUED | OUTPATIENT
Start: 2018-08-31 | End: 2018-08-31

## 2018-08-31 RX ORDER — HYDROCODONE BITARTRATE AND ACETAMINOPHEN 10; 325 MG/1; MG/1
1 TABLET ORAL EVERY 4 HOURS PRN
Status: DISCONTINUED | OUTPATIENT
Start: 2018-08-31 | End: 2018-08-31 | Stop reason: HOSPADM

## 2018-08-31 RX ORDER — HYDROCODONE BITARTRATE AND ACETAMINOPHEN 5; 325 MG/1; MG/1
1 TABLET ORAL EVERY 6 HOURS PRN
Qty: 20 TABLET | Refills: 0 | OUTPATIENT
Start: 2018-08-31 | End: 2021-10-29

## 2018-08-31 RX ORDER — SODIUM CHLORIDE, SODIUM LACTATE, POTASSIUM CHLORIDE, CALCIUM CHLORIDE 600; 310; 30; 20 MG/100ML; MG/100ML; MG/100ML; MG/100ML
INJECTION, SOLUTION INTRAVENOUS CONTINUOUS PRN
Status: DISCONTINUED | OUTPATIENT
Start: 2018-08-31 | End: 2018-08-31

## 2018-08-31 RX ORDER — FENTANYL CITRATE 50 UG/ML
INJECTION, SOLUTION INTRAMUSCULAR; INTRAVENOUS
Status: DISCONTINUED | OUTPATIENT
Start: 2018-08-31 | End: 2018-08-31

## 2018-08-31 RX ORDER — MIDAZOLAM HYDROCHLORIDE 1 MG/ML
INJECTION INTRAMUSCULAR; INTRAVENOUS
Status: DISCONTINUED | OUTPATIENT
Start: 2018-08-31 | End: 2018-08-31

## 2018-08-31 RX ORDER — HYDROMORPHONE HYDROCHLORIDE 2 MG/ML
INJECTION, SOLUTION INTRAMUSCULAR; INTRAVENOUS; SUBCUTANEOUS
Status: DISCONTINUED | OUTPATIENT
Start: 2018-08-31 | End: 2018-08-31

## 2018-08-31 RX ORDER — ACETAMINOPHEN 325 MG/1
650 TABLET ORAL EVERY 4 HOURS PRN
Status: DISCONTINUED | OUTPATIENT
Start: 2018-08-31 | End: 2018-08-31 | Stop reason: HOSPADM

## 2018-08-31 RX ORDER — SODIUM CHLORIDE 9 MG/ML
INJECTION, SOLUTION INTRAVENOUS CONTINUOUS
Status: DISCONTINUED | OUTPATIENT
Start: 2018-08-31 | End: 2018-08-31 | Stop reason: HOSPADM

## 2018-08-31 RX ORDER — NEOSTIGMINE METHYLSULFATE 1 MG/ML
INJECTION, SOLUTION INTRAVENOUS
Status: DISCONTINUED | OUTPATIENT
Start: 2018-08-31 | End: 2018-08-31

## 2018-08-31 RX ORDER — KETOROLAC TROMETHAMINE 30 MG/ML
INJECTION, SOLUTION INTRAMUSCULAR; INTRAVENOUS
Status: DISCONTINUED | OUTPATIENT
Start: 2018-08-31 | End: 2018-08-31

## 2018-08-31 RX ORDER — ONDANSETRON 2 MG/ML
4 INJECTION INTRAMUSCULAR; INTRAVENOUS ONCE AS NEEDED
Status: DISCONTINUED | OUTPATIENT
Start: 2018-08-31 | End: 2018-08-31 | Stop reason: HOSPADM

## 2018-08-31 RX ORDER — BUPIVACAINE HYDROCHLORIDE 2.5 MG/ML
INJECTION, SOLUTION INFILTRATION; PERINEURAL
Status: DISCONTINUED | OUTPATIENT
Start: 2018-08-31 | End: 2018-08-31 | Stop reason: HOSPADM

## 2018-08-31 RX ORDER — SUCCINYLCHOLINE CHLORIDE 20 MG/ML
INJECTION INTRAMUSCULAR; INTRAVENOUS
Status: DISCONTINUED | OUTPATIENT
Start: 2018-08-31 | End: 2018-08-31

## 2018-08-31 RX ORDER — LIDOCAINE HCL/PF 100 MG/5ML
SYRINGE (ML) INTRAVENOUS
Status: DISCONTINUED | OUTPATIENT
Start: 2018-08-31 | End: 2018-08-31

## 2018-08-31 RX ORDER — ROCURONIUM BROMIDE 10 MG/ML
INJECTION, SOLUTION INTRAVENOUS
Status: DISCONTINUED | OUTPATIENT
Start: 2018-08-31 | End: 2018-08-31

## 2018-08-31 RX ORDER — GLYCOPYRROLATE 0.2 MG/ML
INJECTION INTRAMUSCULAR; INTRAVENOUS
Status: DISCONTINUED | OUTPATIENT
Start: 2018-08-31 | End: 2018-08-31

## 2018-08-31 RX ORDER — SODIUM CHLORIDE 0.9 % (FLUSH) 0.9 %
3 SYRINGE (ML) INJECTION EVERY 8 HOURS
Status: DISCONTINUED | OUTPATIENT
Start: 2018-08-31 | End: 2018-08-31 | Stop reason: HOSPADM

## 2018-08-31 RX ORDER — ONDANSETRON HYDROCHLORIDE 2 MG/ML
INJECTION, SOLUTION INTRAMUSCULAR; INTRAVENOUS
Status: DISCONTINUED | OUTPATIENT
Start: 2018-08-31 | End: 2018-08-31

## 2018-08-31 RX ADMIN — KETOROLAC TROMETHAMINE 30 MG: 30 INJECTION, SOLUTION INTRAMUSCULAR; INTRAVENOUS at 10:08

## 2018-08-31 RX ADMIN — PROPOFOL 150 MG: 10 INJECTION, EMULSION INTRAVENOUS at 09:08

## 2018-08-31 RX ADMIN — ONDANSETRON 8 MG: 2 INJECTION, SOLUTION INTRAMUSCULAR; INTRAVENOUS at 10:08

## 2018-08-31 RX ADMIN — SUCCINYLCHOLINE CHLORIDE 100 MG: 20 INJECTION, SOLUTION INTRAMUSCULAR; INTRAVENOUS at 09:08

## 2018-08-31 RX ADMIN — HYDROMORPHONE HYDROCHLORIDE 1 MG: 2 INJECTION INTRAMUSCULAR; INTRAVENOUS; SUBCUTANEOUS at 10:08

## 2018-08-31 RX ADMIN — ROCURONIUM BROMIDE 5 MG: 10 INJECTION, SOLUTION INTRAVENOUS at 09:08

## 2018-08-31 RX ADMIN — LIDOCAINE HYDROCHLORIDE 80 MG: 20 INJECTION, SOLUTION INTRAVENOUS at 09:08

## 2018-08-31 RX ADMIN — MIDAZOLAM HYDROCHLORIDE 2 MG: 1 INJECTION, SOLUTION INTRAMUSCULAR; INTRAVENOUS at 08:08

## 2018-08-31 RX ADMIN — FENTANYL CITRATE 150 MCG: 50 INJECTION, SOLUTION INTRAMUSCULAR; INTRAVENOUS at 09:08

## 2018-08-31 RX ADMIN — NEOSTIGMINE METHYLSULFATE 5 MG: 1 INJECTION INTRAVENOUS at 10:08

## 2018-08-31 RX ADMIN — HYDROMORPHONE HYDROCHLORIDE 0.6 MG: 2 INJECTION INTRAMUSCULAR; INTRAVENOUS; SUBCUTANEOUS at 10:08

## 2018-08-31 RX ADMIN — GLYCOPYRROLATE 0.8 MG: 0.2 INJECTION, SOLUTION INTRAMUSCULAR; INTRAVENOUS at 10:08

## 2018-08-31 RX ADMIN — SODIUM CHLORIDE, SODIUM LACTATE, POTASSIUM CHLORIDE, AND CALCIUM CHLORIDE: .6; .31; .03; .02 INJECTION, SOLUTION INTRAVENOUS at 08:08

## 2018-08-31 RX ADMIN — HYDROMORPHONE HYDROCHLORIDE 0.4 MG: 2 INJECTION INTRAMUSCULAR; INTRAVENOUS; SUBCUTANEOUS at 10:08

## 2018-08-31 RX ADMIN — PROMETHAZINE HYDROCHLORIDE 6.25 MG: 25 INJECTION INTRAMUSCULAR; INTRAVENOUS at 12:08

## 2018-08-31 RX ADMIN — FENTANYL CITRATE 100 MCG: 50 INJECTION, SOLUTION INTRAMUSCULAR; INTRAVENOUS at 09:08

## 2018-08-31 RX ADMIN — ROCURONIUM BROMIDE 30 MG: 10 INJECTION, SOLUTION INTRAVENOUS at 09:08

## 2018-08-31 RX ADMIN — HYDROCODONE BITARTRATE AND ACETAMINOPHEN 1 TABLET: 10; 325 TABLET ORAL at 11:08

## 2018-08-31 RX ADMIN — ACETAMINOPHEN 1000 MG: 10 INJECTION, SOLUTION INTRAVENOUS at 09:08

## 2018-08-31 NOTE — PLAN OF CARE
Phenergan given around 1230 for nausea, vss, patient drowsy, no complaints of nausea, still having pain, will continue to monitor and get patient up to urinate shortly

## 2018-08-31 NOTE — OP NOTE
DATE OF PROCEDURE:  08/31/2018.    PREOPERATIVE DIAGNOSES:  Cholecystitis and cholelithiasis.    POSTOPERATIVE DIAGNOSES:  Cholecystitis and cholelithiasis.    OPERATION:  Laparoscopic cholecystectomy.    SURGEON:  Aki Watt M.D.    ASSISTANT:  Dr. Johnson Damico.    ANESTHESIA:  General.    ESTIMATED BLOOD LOSS:  30 mL.    SPECIMEN:  Gallbladder.    PROCEDURE AND FINDINGS:  This patient was admitted as an outpatient electively   for laparoscopic cholecystectomy.  The patient had history of DVT of the leg.    He was on Xarelto.  The patient was converted to subcutaneous heparin and   admitted electively for laparoscopic cholecystectomy.  After satisfactory   general anesthesia, the patient in supine position, abdomen was prepped and   draped in normal sterile manner using ChloraPrep.  Timeout was called.  The   patient's identity was confirmed.  Small supraumbilical incision was made.    Veress needle was introduced.  Abdominal cavity was insufflated using CO2 up to   a pressure of 15.  A 10-mm trocar cannula was introduced through the umbilical   incision and two 5 mm probes right side of the abdomen, one epigastric area.    After properly positioning the patient, the patient had adhesion of the omentum   to the gallbladder, was found to be small contracted gallbladder.  Dissection   was started at the gallbladder cystic duct junction.  Cystic duct was isolated,   doubly hemoclipped and then incised.  Then cystic artery was isolated, doubly   hemoclipped and incised.  Gallbladder was then removed from below upwards using   electrocautery.  Hemostasis satisfactorily maintained.  Gallbladder was   retrieved through the umbilical incision.  Abdominal cavity was thoroughly   irrigated with normal saline solution.  Hemostasis satisfactorily maintained,   was adequately desufflated.  All probes were withdrawn under direct vision.    Closure of all wounds was performed using 0 Vicryl for the fascia.  Skin was    closed with running 4-0 Monocryl.  Incision was infiltrated using 0.25% Marcaine   solution.  Sterile gauze dressing was applied.  The instrument count, sponge   count, and needle count was correct.  The patient tolerated it well.  Estimated   blood loss was 30 mL.  Specimen removed was gallbladder.      MS/AMARIS  dd: 08/31/2018 10:40:06 (CDT)  td: 08/31/2018 12:42:42 (CDT)  Doc ID   #[id number]  Job ID #[id number]    CC:

## 2018-08-31 NOTE — DISCHARGE INSTRUCTIONS
Discharge Instructions for Laparoscopic Cholecystectomy  You have had a procedure known as a laparoscopic cholecystectomy. A laparoscopic cholecystectomy is a procedure to remove your gallbladder. People who have this procedure usually recover more quickly and have less pain than with open gallbladder surgery (called open cholecystectomy). Many surgeons recommend a low-fat diet, avoiding fried food in particular, for the first month after surgery.   You can live a full and healthy life without your gallbladder. This includes eating the foods and doing the things you enjoyed before your gallbladder problems started.  Home care  Recommendations for home care include the following:   · Ask someone to drive you to your appointments for the next 3 days. Dont drive until you are no longer taking pain medicine and are able to step on the brake pedal without hesitation.   · Eat your regular diet. It is wise to stay away from rich, greasy, or spicy food for a few days.  · Remember, it takes at least 1 week for you to get most of your strength and energy back.  · Make an office visit to talk to your healthcare provider if the following symptoms dont go away within a week after your surgery:  ¨ Fatigue  ¨ Pain around the incision  ¨ Diarrhea or constipation  ¨ Loss of appetite     When to call your healthcare provider  Call your healthcare provider immediately if you have any of the following:  · Yellowing of your eyes or skin (jaundice)  · Chills  · Fever of 100.4°F (38.0°C) or higher, or as directed by your healthcare provider   · Redness, swelling, increasing pain, pus, or a foul smell at the incision site  · Dark or rust-colored urine  · Stool that is maynor-colored or light in color instead of brown  · Increasing belly pain  · Rectal bleeding  · Leg swelling or shortness of breath   Date Last Reviewed: 7/1/2016  © 5590-4928 [employer]. [address] [place] [address], [place] [address]. All rights reserved. This  information is not intended as a substitute for professional medical care. Always follow your healthcare professional's instructions.    After Gallbladder Surgery  You can usually go home the same day as your surgery. In some cases, you may need to stay overnight. After open laparoscopic surgery, you will usually need to stay in the hospital for 2 to 5 days. Once youre at home, be sure to follow all your healthcare providers instructions.  In the hospital  Bandages will cover your incisions and you may have special boots on your legs to prevent blood clots. To aid recovery, youll be asked to get up and move as soon as possible. You may also be asked to use a device that helps promote deep breathing to keep your lungs clear.  At home  You can get back to your normal routine as soon as you feel able. To speed healing:  · Take any prescribed pain medicines as directed.  · Follow your healthcare providers instructions about bathing and caring for your incisions.  · Walk and move around as often as possible, but follow your healthcare provider's instructions on how much weight you can lift.   · Ask your healthcare provider about driving and going back to work. This is often about 5 to 10 days after surgery.  Eating normally again  Removing the gallbladder doesnt mean you have to be on a special diet. But you may want to start with light meals. It can also take a few weeks for your digestion to adjust. You may have indigestion, loose stools, or diarrhea. This is common and should go away in time.  Following up  Keep follow-up appointments during your recovery. These allow your healthcare provider to check your progress and answer any questions. Be sure to mention if you have any new symptoms. Also mention if you have diarrhea that doesnt go away.     Call your healthcare provider  Contact your healthcare provider if you have any of the following:  · Fever of 100.4º F (38.0ºC) or higher, or as directed by your  healthcare provider  · Chills  · Increasing pain, redness, or drainage at an incision site  · Vomiting or nausea that lasts more than 12 hours  · Prolonged diarrhea  · Belly pain  · Leg swelling or trouble breathing  · Difficulty urinating  · Bleeding from the rectum   Date Last Reviewed: 7/1/2016  © 9468-0950 [employer]. [address] [place] [address], [place] [address]. All rights reserved. This information is not intended as a substitute for professional medical care. Always follow your healthcare professional's instructions.        ANESTHESIA  -For the first 24 hours after surgery:  Do not drive, use heavy equipment, make important decisions, or drink alcohol  -It is normal to feel sleepy for several hours.  Rest until you are more awake.  -Have someone stay with you, if needed.  They can watch for problems and help keep you safe.  -Some possible post anesthesia side effects include: nausea and vomiting, sore throat and hoarseness, sleepiness, and dizziness.    PAIN  -If you have pain after surgery, pain medicine will help you feel better.  Take it as directed, before pain becomes severe.  Most pain relievers taken by mouth need at least 20-30 minutes to start working.  -Do not drive or drink alcohol while taking pain medicine.  -Pain medication can upset your stomach.  Taking them with a little food may help.  -Other ways to help control pain: elevation, ice, and relaxation  -Call your surgeon if still having unmanageable pain an hour after taking pain medicine.  -Pain medicine can cause constipation.  Taking an over-the counter stool softener while on prescription pain medicine and drinking plenty of fluids can prevent this side effect.  -Call your surgeon if you have severe side effects like: breathing problems, trouble waking up, dizziness, confusion, or severe constipation.    NAUSEA  -Some people have nausea after surgery.  This is often because of anesthesia, pain, pain medicine, or the stress of  surgery.  -Do not push yourself to eat.  Start off with clear liquids and soup.  Slowly move to solid foods.  Don't eat fatty, rich, spicy foods at first.  Eat smaller amounts.  -If you develop persistent nausea and vomiting please notify your surgeon immediately.    BLEEDING  -Different types of surgery require different types of care and dressing changes.  It is important to follow all instructions and advice from your surgeon.  Change dressing as directed.  Call your surgeon for any concerns regarding postop bleeding.    SIGNS OF INFECTION  -Signs of infection include: fever, swelling, drainage, and redness  -Notify your surgeon if you have a fever of 100.4 F (38.0 C) or higher.  -Notify your surgeon if you notice redness, swelling, increased pain, pus, or a foul smell at the incision site.      Acetaminophen; Hydrocodone tablets or capsules  What is this medicine?  ACETAMINOPHEN; HYDROCODONE (a set a HELEN prashanth fen; jessica droe KOE done) is a pain reliever. It is used to treat moderate to severe pain.  How should I use this medicine?  Take this medicine by mouth with a glass of water. Follow the directions on the prescription label. You can take it with or without food. If it upsets your stomach, take it with food. Do not take your medicine more often than directed.  A special MedGuide will be given to you by the pharmacist with each prescription and refill. Be sure to read this information carefully each time.  Talk to your pediatrician regarding the use of this medicine in children. Special care may be needed.  What side effects may I notice from receiving this medicine?  Side effects that you should report to your doctor or health care professional as soon as possible:  · allergic reactions like skin rash, itching or hives, swelling of the face, lips, or tongue  · breathing problems  · confusion  · redness, blistering, peeling or loosening of the skin, including inside the mouth  · signs and symptoms of low blood  pressure like dizziness; feeling faint or lightheaded, falls; unusually weak or tired  · trouble passing urine or change in the amount of urine  · yellowing of the eyes or skin  Side effects that usually do not require medical attention (report to your doctor or health care professional if they continue or are bothersome):  · constipation  · dry mouth  · nausea, vomiting  · tiredness  What may interact with this medicine?  This medicine may interact with the following medications:  · alcohol  · antiviral medicines for HIV or AIDS  · atropine  · antihistamines for allergy, cough and cold  · certain antibiotics like erythromycin, clarithromycin  · certain medicines for anxiety or sleep  · certain medicines for bladder problems like oxybutynin, tolterodine  · certain medicines for depression like amitriptyline, fluoxetine, sertraline  · certain medicines for fungal infections like ketoconazole and itraconazole  · certain medicines for Parkinson's disease like benztropine, trihexyphenidyl  · certain medicines for seizures like carbamazepine, phenobarbital, phenytoin, primidone  · certain medicines for stomach problems like dicyclomine, hyoscyamine  · certain medicines for travel sickness like scopolamine  · general anesthetics like halothane, isoflurane, methoxyflurane, propofol  · ipratropium  · local anesthetics like lidocaine, pramoxine, tetracaine  · MAOIs like Carbex, Eldepryl, Marplan, Nardil, and Parnate  · medicines that relax muscles for surgery  · other medicines with acetaminophen  · other narcotic medicines for pain or cough  · phenothiazines like chlorpromazine, mesoridazine, prochlorperazine, thioridazine  · rifampin  What if I miss a dose?  If you miss a dose, take it as soon as you can. If it is almost time for your next dose, take only that dose. Do not take double or extra doses.  Where should I keep my medicine?  Keep out of the reach of children. This medicine can be abused. Keep your medicine in a  safe place to protect it from theft. Do not share this medicine with anyone. Selling or giving away this medicine is dangerous and against the law.  This medicine may cause accidental overdose and death if it taken by other adults, children, or pets. Mix any unused medicine with a substance like cat litter or coffee grounds. Then throw the medicine away in a sealed container like a sealed bag or a coffee can with a lid. Do not use the medicine after the expiration date.  Store at room temperature between 15 and 30 degrees C (59 and 86 degrees F).  What should I tell my health care provider before I take this medicine?  They need to know if you have any of these conditions:  · brain tumor  · Crohn's disease, inflammatory bowel disease, or ulcerative colitis  · drug abuse or addiction  · head injury  · heart or circulation problems  · if you often drink alcohol  · kidney disease or problems going to the bathroom  · liver disease  · lung disease, asthma, or breathing problems  · an unusual or allergic reaction to acetaminophen, hydrocodone, other opioid analgesics, other medicines, foods, dyes, or preservatives  · pregnant or trying to get pregnant  · breast-feeding  What should I watch for while using this medicine?  Tell your doctor or health care professional if your pain does not go away, if it gets worse, or if you have new or a different type of pain. You may develop tolerance to the medicine. Tolerance means that you will need a higher dose of the medicine for pain relief. Tolerance is normal and is expected if you take the medicine for a long time.  Do not suddenly stop taking your medicine because you may develop a severe reaction. Your body becomes used to the medicine. This does NOT mean you are addicted. Addiction is a behavior related to getting and using a drug for a non-medical reason. If you have pain, you have a medical reason to take pain medicine. Your doctor will tell you how much medicine to take.  If your doctor wants you to stop the medicine, the dose will be slowly lowered over time to avoid any side effects.  There are different types of narcotic medicines (opiates). If you take more than one type at the same time or if you are taking another medicine that also causes drowsiness, you may have more side effects. Give your health care provider a list of all medicines you use. Your doctor will tell you how much medicine to take. Do not take more medicine than directed. Call emergency for help if you have problems breathing or unusual sleepiness.  Do not take other medicines that contain acetaminophen with this medicine. Always read labels carefully. If you have questions, ask your doctor or pharmacist.  If you take too much acetaminophen get medical help right away. Too much acetaminophen can be very dangerous and cause liver damage. Even if you do not have symptoms, it is important to get help right away.  You may get drowsy or dizzy. Do not drive, use machinery, or do anything that needs mental alertness until you know how this medicine affects you. Do not stand or sit up quickly, especially if you are an older patient. This reduces the risk of dizzy or fainting spells. Alcohol may interfere with the effect of this medicine. Avoid alcoholic drinks.  The medicine will cause constipation. Try to have a bowel movement at least every 2 to 3 days. If you do not have a bowel movement for 3 days, call your doctor or health care professional.  Your mouth may get dry. Chewing sugarless gum or sucking hard candy, and drinking plenty of water may help. Contact your doctor if the problem does not go away or is severe.  NOTE:This sheet is a summary. It may not cover all possible information. If you have questions about this medicine, talk to your doctor, pharmacist, or health care provider. Copyright© 2017 [employer]

## 2018-08-31 NOTE — ANESTHESIA PREPROCEDURE EVALUATION
08/31/2018  Lorelei Norris is a 48 y.o., female.    Anesthesia Evaluation      I have reviewed the Medications.     Review of Systems  Anesthesia Hx:  Denies Family Hx of Anesthesia complications.   Social:  Non-Smoker, No Alcohol Use    Cardiovascular:   Hypertension  Deep Venous Thrombosis (DVT)        Physical Exam  General:  Well nourished    Airway/Jaw/Neck:  Airway Findings: Mouth Opening: Normal Tongue: Normal  General Airway Assessment: Adult  Mallampati: II      Dental:  Dental Findings: In tact   Chest/Lungs:  Chest/Lungs Findings: Clear to auscultation, Normal Respiratory Rate     Heart/Vascular:  Heart Findings: Rate: Normal  Rhythm: Regular Rhythm        Mental Status:  Mental Status Findings:  Cooperative, Alert and Oriented         Anesthesia Plan  Type of Anesthesia, risks & benefits discussed:  Anesthesia Type:  general  Patient's Preference: general  Intra-op Monitoring Plan:   Intra-op Monitoring Plan Comments:   Post Op Pain Control Plan:   Post Op Pain Control Plan Comments:   Induction:   IV  Beta Blocker:  Patient is not currently on a Beta-Blocker (No further documentation required).       Informed Consent: Patient understands risks and agrees with Anesthesia plan.  Questions answered. Anesthesia consent signed with patient.  ASA Score: 2     Day of Surgery Review of History & Physical:            Ready For Surgery From Anesthesia Perspective.

## 2018-08-31 NOTE — BRIEF OP NOTE
Operative Note       Surgery Date: 8/31/2018     Surgeon(s) and Role:     * Aki Watt MD - Primary  Assistant Surgeon: Dr.Johnson Damico  Pre-op Diagnosis:  Essential hypertension [I10]  Acute deep vein thrombosis (DVT) of popliteal vein of left lower extremity [I82.432]  Acute on chronic cholecystitis [K81.2]  Calculus of gallbladder with acute on chronic cholecystitis without obstruction [K80.12]    Post-op Diagnosis: Post-Op Diagnosis Codes:     * Essential hypertension [I10]     * Acute deep vein thrombosis (DVT) of popliteal vein of left lower extremity [I82.432]     * Acute on chronic cholecystitis [K81.2]     * Calculus of gallbladder with acute on chronic cholecystitis without obstruction [K80.12]    Procedure(s) (LRB):  CHOLECYSTECTOMY, LAPAROSCOPIC (N/A)    Anesthesia: General    Procedure in Detail/Findings:  Laparoscopic cholecystectomy done for chronic cholecystitis and cholelithiasis done under general anaesthesia . Patient tolerated procedure well and was sent to recovery room in stable condition, intra op finding chronic cholecystitis and cholelithiasis.  No intra op complications  Estimated Blood Loss:    30 cc      Specimens (From admission, onward)    Start     Ordered    08/31/18 0956  Specimen to Pathology - Surgery  Gall bladder    Start Status   08/31/18 0956 Collected (08/31/18 1002)       08/31/18 0956        Implants: * No implants in log *           Disposition: PACU - hemodynamically stable.           Condition: Good    Attestation:  I performed the procedure.           Discharge Note    Admit Date: 8/31/2018    Attending Physician: Aki Watt MD     Discharge Physician: Aki Watt MD    Final Diagnosis: Post-Op Diagnosis Codes:     * Essential hypertension [I10]     * Acute deep vein thrombosis (DVT) of popliteal vein of left lower extremity [I82.432]     * Acute on chronic cholecystitis [K81.2]     * Calculus of gallbladder with acute on chronic  cholecystitis without obstruction [K80.12]    Disposition: Home or Self Care    Patient Instructions:   Current Discharge Medication List      START taking these medications    Details   HYDROcodone-acetaminophen (NORCO) 5-325 mg per tablet Take 1 tablet by mouth every 6 (six) hours as needed for Pain.  Qty: 20 tablet, Refills: 0         CONTINUE these medications which have NOT CHANGED    Details   albuterol 90 mcg/actuation inhaler Inhale 2 puffs into the lungs every 6 (six) hours as needed for Wheezing. Rescue  Qty: 18 g, Refills: 1    Associated Diagnoses: Mild intermittent asthma without complication      enoxaparin (LOVENOX) 80 mg/0.8 mL Syrg Inject 0.8 mLs (80 mg total) into the skin every 12 (twelve) hours. Stop Xarelto while using lovenox for before surgery  Qty: 14 Syringe, Refills: 0    Associated Diagnoses: Acute deep vein thrombosis (DVT) of popliteal vein of left lower extremity      lisinopril 10 MG tablet Take 1 tablet (10 mg total) by mouth once daily.  Qty: 20 tablet, Refills: 0    Associated Diagnoses: Essential hypertension      naproxen (NAPROSYN) 250 MG tablet       ondansetron (ZOFRAN) 4 MG tablet Take 1 tablet (4 mg total) by mouth every 6 (six) hours.  Qty: 12 tablet, Refills: 0      epinephrine (EPIPEN) 0.3 mg/0.3 mL AtIn Inject 0.3 mLs (0.3 mg total) into the muscle as needed.  Qty: 1 Device, Refills: 0      ondansetron (ZOFRAN-ODT) 4 MG TbDL Take 1 tablet (4 mg total) by mouth every 8 (eight) hours as needed.  Qty: 15 tablet, Refills: 0      XARELTO 20 mg Tab              Low salt diet, no heavy lifting , keep dressing dry and intact , return to office one week.   Discharge Procedure Orders (must include Diet, Follow-up, Activity)   Diet general     Keep surgical extremity elevated     Lifting restrictions     Call MD for:  temperature >100.4     Call MD for:  persistent nausea and vomiting     Call MD for:  severe uncontrolled pain     Call MD for:  redness, tenderness, or signs of  infection (pain, swelling, redness, odor or green/yellow discharge around incision site)     Leave dressing on - Keep it clean, dry, and intact until clinic visit        Discharge Date: 8/31/2018.

## 2018-08-31 NOTE — H&P
Today`s Date: 8/31/2018     Admit Date: 8/31/2018    Admitting Physician: Aki Watt MD    Patient`s Name: Lorelei Norris , 48 y.o. female    HISTORY AND CHIEF COMPLAINT  Admitted with recurrent right upper quadrant pain associated with nausea and vomiting, been on blood thinners, for lap cholecystectomy today, no urinary complaints   Patient Active Problem List   Diagnosis    Essential hypertension    Abnormal Pap smear of cervix    Endometrial polyp    Acute deep vein thrombosis (DVT) of popliteal vein of left lower extremity    Acute on chronic cholecystitis       Past Medical History:   Diagnosis Date    Abnormal Pap smear of cervix     5 years ago    DVT (deep venous thrombosis) 06/2018    left leg    Hypertension        Past Surgical History:   Procedure Laterality Date    HYSTERECTOMY      TONSILLECTOMY, ADENOIDECTOMY  06/2017    TUBAL LIGATION         Prior to Admission medications    Medication Sig Start Date End Date Taking? Authorizing Provider   albuterol 90 mcg/actuation inhaler Inhale 2 puffs into the lungs every 6 (six) hours as needed for Wheezing. Rescue 3/23/17 8/31/18 Yes Han Kc MD   enoxaparin (LOVENOX) 80 mg/0.8 mL Syrg Inject 0.8 mLs (80 mg total) into the skin every 12 (twelve) hours. Stop Xarelto while using lovenox for before surgery 8/28/18  Yes Servando Jeffers MD   lisinopril 10 MG tablet Take 1 tablet (10 mg total) by mouth once daily. 8/11/17  Yes NATALIA Gagnon   naproxen (NAPROSYN) 250 MG tablet  7/24/18  Yes Historical Provider, MD   ondansetron (ZOFRAN) 4 MG tablet Take 1 tablet (4 mg total) by mouth every 6 (six) hours. 8/16/18  Yes Amaury Lopez MD   epinephrine (EPIPEN) 0.3 mg/0.3 mL AtIn Inject 0.3 mLs (0.3 mg total) into the muscle as needed. 3/20/17 3/20/18  Guy J. Lefort, MD   ondansetron (ZOFRAN-ODT) 4 MG TbDL Take 1 tablet (4 mg total) by mouth every 8 (eight) hours as needed. 5/10/18   DAMARIS Torres   XARELTO 20  mg Tab  5/3/18   Historical Provider, MD     No current facility-administered medications on file prior to encounter.      Current Outpatient Medications on File Prior to Encounter   Medication Sig Dispense Refill    albuterol 90 mcg/actuation inhaler Inhale 2 puffs into the lungs every 6 (six) hours as needed for Wheezing. Rescue 18 g 1    enoxaparin (LOVENOX) 80 mg/0.8 mL Syrg Inject 0.8 mLs (80 mg total) into the skin every 12 (twelve) hours. Stop Xarelto while using lovenox for before surgery 14 Syringe 0    lisinopril 10 MG tablet Take 1 tablet (10 mg total) by mouth once daily. 20 tablet 0    naproxen (NAPROSYN) 250 MG tablet       ondansetron (ZOFRAN) 4 MG tablet Take 1 tablet (4 mg total) by mouth every 6 (six) hours. 12 tablet 0    epinephrine (EPIPEN) 0.3 mg/0.3 mL AtIn Inject 0.3 mLs (0.3 mg total) into the muscle as needed. 1 Device 0    ondansetron (ZOFRAN-ODT) 4 MG TbDL Take 1 tablet (4 mg total) by mouth every 8 (eight) hours as needed. 15 tablet 0    XARELTO 20 mg Tab           Review of patient's allergies indicates:   Allergen Reactions    Corticosteroids (glucocorticoids) Swelling       Social History:   reports that  has never smoked. she has never used smokeless tobacco. She reports that she does not drink alcohol or use drugs.     History reviewed. No pertinent family history.    PHYSICAL EXAMINATION  Temp:  [97.7 °F (36.5 °C)] 97.7 °F (36.5 °C)  Pulse:  [84] 84  Resp:  [20] 20  SpO2:  [98 %] 98 %  BP: (141)/(85) 141/85    General Condition:   alert x 3    Head & Neck  Anemia: None  Jaundice: None  Neck vein: Not distended  Carotid Bruits: none  Lymph nodes: none palpable  Thyroid: normal    Chest: normal    Heart: normal    Rt. Breast: not examined  Lt. Breast: not examined  Axillary lymph nodes: none    Abdomen: Soft, right upper quadrant  tender with no palpable mass or organ  Hernia: none    Rectal: Defered    Extremities: normal    Vascular: normal    Specific focus  Examination    Imp: chronic cholecystitis and chololithiasis, obesity, copd, h/o dvt    Plan: Lap cholecystectomy today.

## 2018-08-31 NOTE — PLAN OF CARE
Patient still unable to urinate, vss, Dr Watt called and wants to leave patient to attempt to urinate again then in and out cath and send home if unable to urinate

## 2018-08-31 NOTE — TRANSFER OF CARE
"Anesthesia Transfer of Care Note    Patient: Lorelei Norris    Procedure(s) Performed: Procedure(s) (LRB):  CHOLECYSTECTOMY, LAPAROSCOPIC (N/A)    Patient location: PACU    Anesthesia Type: general    Transport from OR: Transported from OR on 6-10 L/min O2 by face mask with adequate spontaneous ventilation    Post pain: adequate analgesia (pt given dilaudid in PACU by CRNA)    Post assessment: no apparent anesthetic complications and tolerated procedure well    Post vital signs: stable    Level of consciousness: awake and responds to stimulation (opens eyes to verbal stimuli)    Nausea/Vomiting: no nausea/vomiting    Complications: none    Transfer of care protocol was followed      Last vitals:   Visit Vitals  BP (!) 141/85 (BP Location: Left arm, Patient Position: Lying)   Pulse 84   Temp 36.5 °C (97.7 °F) (Skin)   Resp 20   Ht 5' 4" (1.626 m)   Wt 83 kg (183 lb)   LMP 08/23/2018   SpO2 98%   BMI 31.41 kg/m²     "

## 2018-09-01 NOTE — ANESTHESIA RELEASE NOTE
"Anesthesia Release from PACU Note    Patient: Lorelei Norris    Procedure(s) Performed: Procedure(s) (LRB):  CHOLECYSTECTOMY, LAPAROSCOPIC (N/A)    Anesthesia type: general    Post pain: Adequate analgesia    Post assessment: no apparent anesthetic complications    Last Vitals:   Visit Vitals  BP (!) 148/72   Pulse 72   Temp 36.5 °C (97.7 °F)   Resp 20   Ht 5' 4" (1.626 m)   Wt 83 kg (183 lb)   LMP 08/23/2018   SpO2 99%   BMI 31.41 kg/m²       Post vital signs: stable    Level of consciousness: awake    Nausea/Vomiting: no nausea/no vomiting    Complications: none    Airway Patency: patent    Respiratory: unassisted, spontaneous ventilation    Cardiovascular: stable and blood pressure at baseline    Hydration: euvolemic  "

## 2018-09-01 NOTE — ANESTHESIA POSTPROCEDURE EVALUATION
"Anesthesia Post Evaluation    Patient: Lorelei Norris    Procedure(s) Performed: Procedure(s) (LRB):  CHOLECYSTECTOMY, LAPAROSCOPIC (N/A)    Final Anesthesia Type: general  Patient location during evaluation: PACU  Patient participation: Yes- Able to Participate  Level of consciousness: awake  Post-procedure vital signs: reviewed and stable  Pain management: adequate  Airway patency: patent  PONV status at discharge: No PONV  Anesthetic complications: no      Cardiovascular status: stable  Respiratory status: unassisted  Hydration status: euvolemic  Follow-up not needed.        Visit Vitals  BP (!) 148/72   Pulse 72   Temp 36.5 °C (97.7 °F)   Resp 20   Ht 5' 4" (1.626 m)   Wt 83 kg (183 lb)   LMP 08/23/2018   SpO2 99%   BMI 31.41 kg/m²       Pain/Cedrick Score: Pain Assessment Performed: Yes (8/31/2018  6:40 PM)  Presence of Pain: denies (8/31/2018  6:40 PM)  Pain Rating Prior to Med Admin: 7 (8/31/2018 11:10 AM)  Cedrick Score: 10 (8/31/2018  6:40 PM)        "

## 2018-09-05 ENCOUNTER — DOCUMENTATION ONLY (OUTPATIENT)
Dept: HEPATOLOGY | Facility: HOSPITAL | Age: 48
End: 2018-09-05

## 2018-09-05 NOTE — PROGRESS NOTES
Pt is now POD#5 s/p saray herrera with Dr. Watt.  She had been on eliquis for bilat LE DVT's prior.  Was bridged prior to surgery on therapeutic lovenox, and subsequently resumed on eliquis POD#2.  She did complain of bilat LE calf swelling and pain which she was treating with ice packs and elevation    She also did describe some mild SOB that she attributed asthma (not documented as a problem in her chart) and was using albuterol with relief at home.    I recommended pt come into office early in the AM yesterday, but fam practice could not fit her in until later in the afternoon.  She was frustrated she arrived in the office but could not be seen until later and chose to leave.    I had told her multiple times that if symptoms of SOB worsened or if she had chest pain, she should go to the ER. She has had difficulty navigating doctor's office visits on her own.

## 2018-09-10 ENCOUNTER — HOSPITAL ENCOUNTER (EMERGENCY)
Facility: HOSPITAL | Age: 48
Discharge: LEFT BEFORE DISCHARGE | End: 2018-09-10
Attending: EMERGENCY MEDICINE
Payer: MEDICAID

## 2018-09-10 VITALS
HEART RATE: 93 BPM | SYSTOLIC BLOOD PRESSURE: 159 MMHG | WEIGHT: 184 LBS | RESPIRATION RATE: 20 BRPM | BODY MASS INDEX: 31.41 KG/M2 | TEMPERATURE: 98 F | DIASTOLIC BLOOD PRESSURE: 88 MMHG | HEIGHT: 64 IN | OXYGEN SATURATION: 100 %

## 2018-09-10 DIAGNOSIS — R07.9 CHEST PAIN: ICD-10-CM

## 2018-09-10 DIAGNOSIS — Z92.29 PERSONAL HISTORY OF LONG-TERM (CURRENT) USE OF ANTICOAGULANTS: ICD-10-CM

## 2018-09-10 DIAGNOSIS — Z98.890 POST-OPERATIVE STATE: Primary | ICD-10-CM

## 2018-09-10 PROBLEM — M79.675 CHRONIC TOE PAIN, LEFT FOOT: Status: ACTIVE | Noted: 2018-09-10

## 2018-09-10 PROBLEM — Z90.49 S/P LAPAROSCOPIC CHOLECYSTECTOMY: Status: ACTIVE | Noted: 2018-09-10

## 2018-09-10 PROBLEM — G89.29 CHRONIC TOE PAIN, LEFT FOOT: Status: ACTIVE | Noted: 2018-09-10

## 2018-09-10 LAB
ALBUMIN SERPL BCP-MCNC: 3.6 G/DL
ALP SERPL-CCNC: 93 U/L
ALT SERPL W/O P-5'-P-CCNC: 19 U/L
ANION GAP SERPL CALC-SCNC: 10 MMOL/L
AST SERPL-CCNC: 20 U/L
BASOPHILS # BLD AUTO: 0.02 K/UL
BASOPHILS NFR BLD: 0.4 %
BILIRUB SERPL-MCNC: 0.3 MG/DL
BNP SERPL-MCNC: <10 PG/ML
BUN SERPL-MCNC: 14 MG/DL
CALCIUM SERPL-MCNC: 10 MG/DL
CHLORIDE SERPL-SCNC: 106 MMOL/L
CO2 SERPL-SCNC: 25 MMOL/L
CREAT SERPL-MCNC: 1.1 MG/DL
DIFFERENTIAL METHOD: ABNORMAL
EOSINOPHIL # BLD AUTO: 0.2 K/UL
EOSINOPHIL NFR BLD: 3.8 %
ERYTHROCYTE [DISTWIDTH] IN BLOOD BY AUTOMATED COUNT: 13.1 %
EST. GFR  (AFRICAN AMERICAN): >60 ML/MIN/1.73 M^2
EST. GFR  (NON AFRICAN AMERICAN): 59 ML/MIN/1.73 M^2
GLUCOSE SERPL-MCNC: 101 MG/DL
HCT VFR BLD AUTO: 40.8 %
HGB BLD-MCNC: 13 G/DL
LYMPHOCYTES # BLD AUTO: 2.5 K/UL
LYMPHOCYTES NFR BLD: 47.2 %
MCH RBC QN AUTO: 28.4 PG
MCHC RBC AUTO-ENTMCNC: 31.9 G/DL
MCV RBC AUTO: 89 FL
MONOCYTES # BLD AUTO: 0.6 K/UL
MONOCYTES NFR BLD: 11.2 %
NEUTROPHILS # BLD AUTO: 2 K/UL
NEUTROPHILS NFR BLD: 37.4 %
PLATELET # BLD AUTO: 274 K/UL
PMV BLD AUTO: 10.2 FL
POTASSIUM SERPL-SCNC: 3.9 MMOL/L
PROT SERPL-MCNC: 7.9 G/DL
RBC # BLD AUTO: 4.57 M/UL
SODIUM SERPL-SCNC: 141 MMOL/L
TROPONIN I SERPL DL<=0.01 NG/ML-MCNC: <0.006 NG/ML
WBC # BLD AUTO: 5.25 K/UL

## 2018-09-10 PROCEDURE — 93005 ELECTROCARDIOGRAM TRACING: CPT

## 2018-09-10 PROCEDURE — 99284 EMERGENCY DEPT VISIT MOD MDM: CPT

## 2018-09-10 PROCEDURE — 83880 ASSAY OF NATRIURETIC PEPTIDE: CPT

## 2018-09-10 PROCEDURE — 84484 ASSAY OF TROPONIN QUANT: CPT

## 2018-09-10 PROCEDURE — 80053 COMPREHEN METABOLIC PANEL: CPT

## 2018-09-10 PROCEDURE — 85025 COMPLETE CBC W/AUTO DIFF WBC: CPT

## 2018-09-10 PROCEDURE — 93010 ELECTROCARDIOGRAM REPORT: CPT | Mod: ,,, | Performed by: INTERNAL MEDICINE

## 2018-09-10 RX ORDER — TRAMADOL HYDROCHLORIDE 50 MG/1
50 TABLET ORAL EVERY 6 HOURS PRN
Qty: 12 TABLET | Refills: 0 | Status: SHIPPED | OUTPATIENT
Start: 2018-09-10 | End: 2018-09-20

## 2018-09-10 NOTE — DISCHARGE INSTRUCTIONS
PLEASE RETURN TO THE ED FOR FURTHER WORKUP OF YOUR CHEST PAIN AND SHORTNESS OF BREATH GIVEN YOUR RECENT POST-OPERATIVE STATE

## 2018-09-14 ENCOUNTER — HOSPITAL ENCOUNTER (EMERGENCY)
Facility: HOSPITAL | Age: 48
Discharge: HOME | End: 2018-09-14
Attending: EMERGENCY MEDICINE
Payer: MEDICAID

## 2018-09-14 VITALS
TEMPERATURE: 98 F | RESPIRATION RATE: 18 BRPM | OXYGEN SATURATION: 100 % | HEIGHT: 64 IN | BODY MASS INDEX: 31.41 KG/M2 | SYSTOLIC BLOOD PRESSURE: 125 MMHG | DIASTOLIC BLOOD PRESSURE: 70 MMHG | WEIGHT: 184 LBS | HEART RATE: 71 BPM

## 2018-09-14 DIAGNOSIS — R07.9 CHEST PAIN IN ADULT: ICD-10-CM

## 2018-09-14 DIAGNOSIS — M79.669 PAIN AND SWELLING OF LOWER LEG: ICD-10-CM

## 2018-09-14 DIAGNOSIS — R07.89 ATYPICAL CHEST PAIN: Primary | ICD-10-CM

## 2018-09-14 DIAGNOSIS — R07.9 CHEST PAIN: ICD-10-CM

## 2018-09-14 DIAGNOSIS — M79.89 PAIN AND SWELLING OF LOWER LEG: ICD-10-CM

## 2018-09-14 LAB
ALBUMIN SERPL BCP-MCNC: 3.5 G/DL
ALP SERPL-CCNC: 100 U/L
ALT SERPL W/O P-5'-P-CCNC: 15 U/L
ANION GAP SERPL CALC-SCNC: 10 MMOL/L
APTT BLDCRRT: 27.9 SEC
AST SERPL-CCNC: 15 U/L
BASOPHILS # BLD AUTO: 0.03 K/UL
BASOPHILS NFR BLD: 0.7 %
BILIRUB SERPL-MCNC: 0.3 MG/DL
BUN SERPL-MCNC: 18 MG/DL
CALCIUM SERPL-MCNC: 9.7 MG/DL
CHLORIDE SERPL-SCNC: 105 MMOL/L
CO2 SERPL-SCNC: 27 MMOL/L
CREAT SERPL-MCNC: 1.1 MG/DL
DIFFERENTIAL METHOD: ABNORMAL
EOSINOPHIL # BLD AUTO: 0.2 K/UL
EOSINOPHIL NFR BLD: 5.6 %
ERYTHROCYTE [DISTWIDTH] IN BLOOD BY AUTOMATED COUNT: 12.5 %
EST. GFR  (AFRICAN AMERICAN): >60 ML/MIN/1.73 M^2
EST. GFR  (NON AFRICAN AMERICAN): 59 ML/MIN/1.73 M^2
GLUCOSE SERPL-MCNC: 103 MG/DL
HCT VFR BLD AUTO: 39.1 %
HGB BLD-MCNC: 12.3 G/DL
INR PPP: 1
LYMPHOCYTES # BLD AUTO: 2.4 K/UL
LYMPHOCYTES NFR BLD: 55.2 %
MCH RBC QN AUTO: 27.8 PG
MCHC RBC AUTO-ENTMCNC: 31.5 G/DL
MCV RBC AUTO: 89 FL
MONOCYTES # BLD AUTO: 0.2 K/UL
MONOCYTES NFR BLD: 5.1 %
NEUTROPHILS # BLD AUTO: 1.4 K/UL
NEUTROPHILS NFR BLD: 33.2 %
PLATELET # BLD AUTO: 280 K/UL
PMV BLD AUTO: 9.8 FL
POTASSIUM SERPL-SCNC: 4.2 MMOL/L
PROT SERPL-MCNC: 7.5 G/DL
PROTHROMBIN TIME: 10.1 SEC
RBC # BLD AUTO: 4.42 M/UL
SODIUM SERPL-SCNC: 142 MMOL/L
TROPONIN I SERPL DL<=0.01 NG/ML-MCNC: <0.006 NG/ML
WBC # BLD AUTO: 4.29 K/UL

## 2018-09-14 PROCEDURE — 93005 ELECTROCARDIOGRAM TRACING: CPT

## 2018-09-14 PROCEDURE — 93010 ELECTROCARDIOGRAM REPORT: CPT | Mod: ,,, | Performed by: INTERNAL MEDICINE

## 2018-09-14 PROCEDURE — 85025 COMPLETE CBC W/AUTO DIFF WBC: CPT

## 2018-09-14 PROCEDURE — 84484 ASSAY OF TROPONIN QUANT: CPT

## 2018-09-14 PROCEDURE — 25500020 PHARM REV CODE 255: Performed by: EMERGENCY MEDICINE

## 2018-09-14 PROCEDURE — 99284 EMERGENCY DEPT VISIT MOD MDM: CPT | Mod: 25

## 2018-09-14 PROCEDURE — 85610 PROTHROMBIN TIME: CPT

## 2018-09-14 PROCEDURE — 80053 COMPREHEN METABOLIC PANEL: CPT

## 2018-09-14 PROCEDURE — 85730 THROMBOPLASTIN TIME PARTIAL: CPT

## 2018-09-14 RX ADMIN — IOHEXOL 100 ML: 350 INJECTION, SOLUTION INTRAVENOUS at 01:09

## 2018-09-14 NOTE — ED PROVIDER NOTES
Encounter Date: 9/14/2018       History     Chief Complaint   Patient presents with    Chest Pain     c/o pain to left upper chest and SOB x1 week. Pt was taking lovenox, but stopped taking them last week prior to having her gallbladder removed     Lorelei Norris is a 48 y.o. female who  has a past medical history of Abnormal Pap smear of cervix, DVT (deep venous thrombosis) (06/2018), and Hypertension.    The patient presents to the ED due to intermitted upper left chest pain that occurs only at night for one week. She reports that the pain occurred since stopping Lovenox, blood thinner, due to gallbladder removal. She also reports of SOB, but denies fever, cough, nausea, or vomiting.  She was seen by Dr. Watt who stated to report to the ED. She has a history of DVT to the right leg and denies history of blood clots to her lungs.     She also reports of chronic left foot pain since removal of boot from foot fracture at occurred approximately 6 months ago. She states she is unable to bend her left ankle. She also states she has not followed up with her PCP about her injury since removal of boot.       The history is provided by the patient.     Review of patient's allergies indicates:   Allergen Reactions    Corticosteroids (glucocorticoids) Swelling     Past Medical History:   Diagnosis Date    Abnormal Pap smear of cervix     5 years ago    DVT (deep venous thrombosis) 06/2018    left leg    Hypertension      Past Surgical History:   Procedure Laterality Date    ABLATION-ENDOMETRIAL N/A 8/15/2017    Performed by [facility]endez [facility] at [facility] OR    CHOLECYSTECTOMY, LAPAROSCOPIC N/A 8/31/2018    Performed by [facility]ki [facility]att [facility] at [facility] OR    HYSTERECTOMY      [id number] N/A 8/15/2017    Performed by [facility] at [facility] OR    LAPAROSCOPIC CHOLECYSTECTOMY N/A 8/31/2018    Procedure: CHOLECYSTECTOMY, LAPAROSCOPIC;  Surgeon: Aki Watt MD;  Location: [facility] OR;   Service: General;  Laterality: N/A;  No PAT Available-Anes in AM  VIDEO    TONSILLECTOMY, ADENOIDECTOMY  06/2017    TUBAL LIGATION       No family history on file.  Social History     Tobacco Use    Smoking status: Never Smoker    Smokeless tobacco: Never Used   Substance Use Topics    Alcohol use: No    Drug use: No     Review of Systems   Constitutional: Negative for chills and fever.   HENT: Negative for congestion, rhinorrhea and sore throat.    Eyes: Negative for redness and visual disturbance.   Respiratory: Positive for shortness of breath. Negative for cough and wheezing.    Cardiovascular: Positive for chest pain (Left upper chest). Negative for palpitations.   Gastrointestinal: Negative for abdominal pain, diarrhea, nausea and vomiting.   Genitourinary: Negative for dysuria and hematuria.   Musculoskeletal: Negative for back pain, myalgias and neck pain.        Left foot pain   Skin: Negative for rash.   Neurological: Negative for dizziness, weakness and light-headedness.   Psychiatric/Behavioral: Negative for confusion.       Physical Exam     Initial Vitals [09/14/18 1014]   BP Pulse Resp Temp SpO2   128/85 101 20 98 °F (36.7 °C) 100 %      MAP       --         Physical Exam    Constitutional: She appears well-developed and well-nourished.   HENT:   Head: Normocephalic and atraumatic.   Eyes: EOM are normal. Pupils are equal, round, and reactive to light.   Neck: Normal range of motion. Neck supple. No tracheal deviation present.   Cardiovascular: Normal rate, regular rhythm and normal heart sounds.   Pulmonary/Chest: Breath sounds normal. No respiratory distress.   Abdominal: Soft. Bowel sounds are normal. There is no tenderness.   Musculoskeletal:   Left chronic ankle that is neurovascularly intact   Neurological: She is alert and oriented to person, place, and time.   Skin: Skin is warm and dry.         ED Course   Procedures  Labs Reviewed   CBC W/ AUTO DIFFERENTIAL - Abnormal; Notable for the  following components:       Result Value    MCHC 31.5 (*)     Gran # (ANC) 1.4 (*)     Mono # 0.2 (*)     Gran% 33.2 (*)     Lymph% 55.2 (*)     All other components within normal limits   COMPREHENSIVE METABOLIC PANEL - Abnormal; Notable for the following components:    eGFR if non  59 (*)     All other components within normal limits   TROPONIN I   PROTIME-INR   APTT     EKG Readings: (Independently Interpreted)   Normal sinus rhythm at 85 bpm. Normal intervals, normal axis, no acute ischemic changes.       Imaging Results          CTA Chest Non-Coronary (Final result)  Result time 09/14/18 14:10:30    Final result by Thuan Malin MD (09/14/18 14:10:30)                 Impression:      No evidence of pulmonary thromboembolism or other acute findings in the chest.    Findings suggest renal medullary nephrocalcinosis.      Electronically signed by: Thuan Malin MD  Date:    09/14/2018  Time:    14:10             Narrative:    EXAMINATION:  CTA CHEST NON CORONARY    CLINICAL HISTORY:  cp sob hx dvt PE;Chest pain, unspecified    TECHNIQUE:  Low dose axial images, sagittal and coronal reformations were obtained from the thoracic inlet to the lung bases following the IV administration of 100 mL of Omnipaque 350.  Contrast timing was optimized to evaluate the pulmonary arteries.  MIP images were performed.    COMPARISON:  Chest radiograph the same day    FINDINGS:  The structures at the base of the neck demonstrate no significant abnormality.  There is no abnormal axillary or mediastinal lymph node enlargement.  The heart is not enlarged.  No significant pericardial fluid.  Aorta is normal in caliber.  No evidence of pulmonary thromboembolism.    Trachea and central airways are patent.  Lungs are symmetrically expanded without evidence of mass, consolidation, effusion, or pneumothorax.    Limited review of the upper abdomen demonstrates surgical absence of the gallbladder.  Multiple calcifications in  the kidneys suggesting medullary nephrocalcinosis.    Bones demonstrate no acute abnormality.                               US Lower Extremity Veins Bilateral (Final result)  Result time 09/14/18 13:44:33    Final result by Prosper Collado MD (09/14/18 13:44:33)                 Impression:      No evidence of deep venous thrombosis in either lower extremity.      Electronically signed by: Prosper Collado MD  Date:    09/14/2018  Time:    13:44             Narrative:    EXAMINATION:  US LOWER EXTREMITY VEINS BILATERAL    CLINICAL HISTORY:  Pain in unspecified lower leg    TECHNIQUE:  Duplex and color flow Doppler and dynamic compression was performed of the bilateral lower extremity veins was performed.    COMPARISON:  None    FINDINGS:  Right thigh veins: The common femoral, femoral, popliteal, upper greater saphenous, and deep femoral veins are patent and free of thrombus. The veins are normally compressible and have normal phasic flow and augmentation response.    Right calf veins: The visualized calf veins are patent.    Left thigh veins: The common femoral, femoral, popliteal, upper greater saphenous, and deep femoral veins are patent and free of thrombus. The veins are normally compressible and have normal phasic flow and augmentation response.    Left calf veins: The visualized calf veins are patent.    Miscellaneous: None                               X-Ray Chest PA And Lateral (Final result)  Result time 09/14/18 12:02:47    Final result by Yusuf Topete MD (09/14/18 12:02:47)                 Impression:      No acute cardiopulmonary process.      Electronically signed by: Yusuf Topete MD  Date:    09/14/2018  Time:    12:02             Narrative:    EXAMINATION:  XR CHEST PA AND LATERAL    CLINICAL HISTORY:  Chest pain, unspecified    TECHNIQUE:  PA and lateral views of the chest were performed.    COMPARISON:  09/10/2018    FINDINGS:  There is no consolidation, effusion, or pneumothorax.  Cardiomediastinal  silhouette is unremarkable.  Regional osseous structures are unremarkable.  Surgical clips noted in the right upper quadrant.                                 Medical Decision Making:   Clinical Tests:   Lab Tests: Ordered and Reviewed  Radiological Study: Ordered and Reviewed  Medical Tests: Ordered and Reviewed                      Clinical Impression:   Atypical chest pain    Disposition:   Disposition: Discharged  48-year-old female presents to the ER complaining of 1 week of episodic chest pain happens only at night not constant can't really describe how long it lasts when it comes on she is chest pain-free now no fevers no cough no shortness of breath for the she has history of hypertension DVT on Lovenox she was here Monday for the same complaints and left AMA temperature 98° pulse 101 respiratory rate of 18 blood pressure 128/85 100% sat on room air lungs clear auscultation bilaterally heart regular rate rhythm no S3-S4 murmurs bilateral lower extremities are neurovascularly intact without any edema swelling calf cords signs of infection or tenderness on palpation with full range of motion at all joints.  No evidence of any trauma. Skin is normal over both lower extremities. Negative Homans sign bilaterally.  CBC normal comprehensive metabolic panel normal EKG sinus rhythm with no ischemic changes troponin negative coags normal chest x-ray no acute process per radiologist CT pulmonary angiogram negative per radiologist for PE.  Ultrasound bilateral lower extremities negative for DVT per radiologist.  Patient will be discharged home diagnosis atypical chest pain asked her follow up with her primary care physician today return to the ER for any increasing chest pain shortness of breath fevers cough nausea vomiting    I, Dr. Eleazar Ko, personally performed the services described in this documentation. All medical record entries made by the scribe were at my direction and in my presence. I have reviewed  the chart and agree that the record is accurate and complete. Eleazar Ko MD.                    Eleazar Ko MD  09/14/18 [id number]

## 2018-10-11 NOTE — ED PROVIDER NOTES
Encounter Date: 9/10/2018       History     Chief Complaint   Patient presents with    Chest Pain     48 year old female presents to ed cc of mid sternal chest pain and bilateral leg swelling. patient states was seen in dr. doan office today for post op eval of gallbladder when patient told roderick of symptoms was told to come to er for further eval     47 yo F was seen in SORT, by provider, however wanted to leave before being seen by ER physician.           Review of patient's allergies indicates:   Allergen Reactions    Corticosteroids (glucocorticoids) Swelling     Past Medical History:   Diagnosis Date    Abnormal Pap smear of cervix     5 years ago    DVT (deep venous thrombosis) 06/2018    left leg    Hypertension      Past Surgical History:   Procedure Laterality Date    ABLATION-ENDOMETRIAL N/A 8/15/2017    [facility] at [facility]    CHOLECYSTECTOMY, LAPAROSCOPIC N/A 8/31/2018    [facility] by [facility] at [facility]    HYSTERECTOMY      [id number] N/A 8/15/2017    [facility] by [facility] at [facility] OR    LAPAROSCOPIC CHOLECYSTECTOMY N/A 8/31/2018    Procedure: CHOLECYSTECTOMY, LAPAROSCOPIC;  Surgeon: Aki Watt MD;  Location: [facility] OR;  Service: General;  Laterality: N/A;  No PAT Available-Anes in AM  VIDEO    TONSILLECTOMY, ADENOIDECTOMY  06/2017    TUBAL LIGATION       History reviewed. No pertinent family history.  Social History     Tobacco Use    Smoking status: Never Smoker    Smokeless tobacco: Never Used   Substance Use Topics    Alcohol use: No    Drug use: No     Review of Systems   Unable to perform ROS: Other     Patient wanted to leave before full exam or history taken  Physical Exam     Initial Vitals [09/10/18 1521]   BP Pulse Resp Temp SpO2   (!) 159/88 93 20 98 °F (36.7 °C) 100 %      MAP       --         Physical Exam    Nursing note and vitals reviewed.  Constitutional:   Awake and alert, capable of  making her own decisions         ED Course   Procedures  Labs Reviewed   CBC W/ AUTO DIFFERENTIAL - Abnormal; Notable for the following components:       Result Value    MCHC 31.9 (*)     Gran% 37.4 (*)     All other components within normal limits   COMPREHENSIVE METABOLIC PANEL - Abnormal; Notable for the following components:    eGFR if non  59 (*)     All other components within normal limits   TROPONIN I   B-TYPE NATRIURETIC PEPTIDE          Imaging Results          X-Ray Chest PA And Lateral (Final result)  Result time 09/10/18 15:59:14    Final result by Yesi Ravi MD (09/10/18 15:59:14)                 Impression:      The source of the patient's chest pain is not established on this study.      Electronically signed by: Yesi Ravi MD  Date:    09/10/2018  Time:    15:59             Narrative:    EXAMINATION:  XR CHEST PA AND LATERAL    CLINICAL HISTORY:  Chest Pain;    TECHNIQUE:  PA and lateral views of the chest were performed.    COMPARISON:  CT renal stone study: 08/16/2018.    CT abdomen and pelvis: 05/10/2018.    Chest CTA 08/19/2017.    Chest radiograph 08/19/2017.    FINDINGS:  X-ray beam attenuation and scatter occur in generous overlying soft tissues.  Soft tissues of the patient's arms project over lateral view obscuring some detail the retrosternal airspace and mediastinal margins.    Mediastinal structures are midline.  Cardiac silhouette and pulmonary vascular distribution appear normal.    Lung volumes are normal and symmetric.  I detect no convincing evidence of pulmonary disease and no pleural fluid, lymph node enlargement, cardiac decompensation, pneumothorax, pneumomediastinum, pneumoperitoneum or significant osseous abnormality.  The source of the patient's chest pain is not apparent to me on this study.    However chest CT performed August 19, 2017 revealed patchy subsegmental ground-glass opacities that were not apparent on the chest radiograph performed the  same day.  If there is persistent clinical concern for pulmonary parenchymal disease I recommend chest CT for more sensitive assessment, preferably with noncontrast technique and high-resolution images.                                 Medical Decision Making:   ED Management:  I have explained to the patient the nature of his/her illness, injury, or disease and the need and advisability of continued in-hospital medical care/treatment together with the known risks of discontinuing medical care/treatment at this time. Furthermore, I have offered the patient the opportunity to ask and have answered all questions regarding his/her condition, the need for continued treatment and the possible risks associated with prematurely discontinuing in-hospital medical care/treatment and has reiterated his/her desire and intention to leave [facility] against medical advice                     ED Course as of Oct 11 1523   Mon Sep 10, 2018   1529 Sort note: Lorelei Norris nontoxic/afebrile 48 y.o.  presented to the ED with c/o CP for the last several days that typically occurs at night. She reports associated SOB with pain and fever on Friday. She denies any presently. She reports increased bilateral leg swelling after lap Susan with history of DVT. Decreased BM's and nausea as well.    Patient seen and medically screened by Physician assistant in Sort process due to ED crowding.  Appropriate tests and/or medications ordered.  Care transferred to an alternate provider when patient was placed in an Exam Room from the [facility] for physical exam, additional orders, and disposition. 3:29 PM. LC    [LC]      ED Course User Index  [LC] DAMARIS Torres     Clinical Impression:   The primary encounter diagnosis was Post-operative state. Diagnoses of Chest pain and Personal history of long-term (current) use of anticoagulants were also pertinent to this visit.                             Judith Quinonez,  MD  10/11/18 1526

## 2018-10-16 ENCOUNTER — DOCUMENTATION ONLY (OUTPATIENT)
Dept: FAMILY MEDICINE | Facility: HOSPITAL | Age: 48
End: 2018-10-16

## 2018-10-16 NOTE — PROGRESS NOTES
I contacted patient for follow-up with primary care physician. Patient is currently out of town and won't return until December. Pt says she will contact office at a later date to schedule that appointment.

## 2019-03-04 ENCOUNTER — HOSPITAL ENCOUNTER (OUTPATIENT)
Dept: MEDSURG UNIT | Facility: HOSPITAL | Age: 49
End: 2019-03-05
Attending: INTERNAL MEDICINE | Admitting: INTERNAL MEDICINE

## 2019-03-04 ENCOUNTER — HISTORICAL (OUTPATIENT)
Dept: PHYSICAL THERAPY | Facility: HOSPITAL | Age: 49
End: 2019-03-04

## 2019-03-04 LAB
ABS NEUT (OLG): 2.39 X10(3)/MCL (ref 2.1–9.2)
ALBUMIN SERPL-MCNC: 3.2 GM/DL (ref 3.4–5)
ALBUMIN/GLOB SERPL: 0.7 RATIO (ref 1.1–2)
ALP SERPL-CCNC: 87 UNIT/L (ref 45–117)
ALT SERPL-CCNC: 17 UNIT/L (ref 12–78)
ANISOCYTOSIS BLD QL SMEAR: ABNORMAL
APTT PPP: 24.6 SECOND(S) (ref 23.3–37)
AST SERPL-CCNC: 12 UNIT/L (ref 15–37)
BASOPHILS NFR BLD MANUAL: 0 %
BILIRUB SERPL-MCNC: 0.3 MG/DL (ref 0.2–1)
BILIRUBIN DIRECT+TOT PNL SERPL-MCNC: <0.1 MG/DL
BILIRUBIN DIRECT+TOT PNL SERPL-MCNC: ABNORMAL MG/DL
BUN SERPL-MCNC: 16 MG/DL (ref 7–18)
CALCIUM SERPL-MCNC: 8.9 MG/DL (ref 8.5–10.1)
CHLORIDE SERPL-SCNC: 112 MMOL/L (ref 98–107)
CO2 SERPL-SCNC: 27 MMOL/L (ref 21–32)
CREAT SERPL-MCNC: 1.2 MG/DL (ref 0.6–1.3)
EOSINOPHIL NFR BLD MANUAL: 2 %
ERYTHROCYTE [DISTWIDTH] IN BLOOD BY AUTOMATED COUNT: 13.1 % (ref 11.5–14.5)
GLOBULIN SER-MCNC: 4.4 GM/ML (ref 2.3–3.5)
GLUCOSE SERPL-MCNC: 100 MG/DL (ref 74–106)
GRANULOCYTES NFR BLD MANUAL: 33 % (ref 43–75)
HCT VFR BLD AUTO: 40.5 % (ref 35–46)
HGB BLD-MCNC: 13 GM/DL (ref 12–16)
INR PPP: 0.91 (ref 0.9–1.2)
LYMPHOCYTES NFR BLD MANUAL: 44 % (ref 20.5–51.1)
MCH RBC QN AUTO: 28.8 PG (ref 26–34)
MCHC RBC AUTO-ENTMCNC: 32.1 GM/DL (ref 31–37)
MCV RBC AUTO: 89.6 FL (ref 80–100)
MICROCYTES BLD QL SMEAR: ABNORMAL
MONOCYTES NFR BLD MANUAL: 13 % (ref 2–9)
NEUTS BAND NFR BLD MANUAL: 1 % (ref 0–10)
PLATELET # BLD AUTO: 274 X10(3)/MCL (ref 130–400)
PLATELET # BLD EST: NORMAL 10*3/UL
PMV BLD AUTO: 9.9 FL (ref 7.4–10.4)
POTASSIUM SERPL-SCNC: 3.8 MMOL/L (ref 3.5–5.1)
PROT SERPL-MCNC: 7.6 GM/DL (ref 6.4–8.2)
PROTHROMBIN TIME: 12.2 SECOND(S) (ref 11.9–14.4)
RBC # BLD AUTO: 4.52 X10(6)/MCL (ref 4–5.2)
RBC MORPH BLD: ABNORMAL
SODIUM SERPL-SCNC: 143 MMOL/L (ref 136–145)
TROPONIN I SERPL-MCNC: <0.015 NG/ML (ref 0–0.05)
TROPONIN I SERPL-MCNC: <0.015 NG/ML (ref 0–0.05)
WBC # SPEC AUTO: 6.5 X10(3)/MCL (ref 4.5–11)

## 2019-03-05 LAB
ABS NEUT (OLG): 1.17 X10(3)/MCL
ANISOCYTOSIS BLD QL SMEAR: ABNORMAL
BASOPHILS NFR BLD MANUAL: 1 %
BUN SERPL-MCNC: 14 MG/DL (ref 7–18)
CALCIUM SERPL-MCNC: 8.4 MG/DL (ref 8.5–10.1)
CHLORIDE SERPL-SCNC: 113 MMOL/L (ref 98–107)
CO2 SERPL-SCNC: 27 MMOL/L (ref 21–32)
CREAT SERPL-MCNC: 1.1 MG/DL (ref 0.6–1.3)
CREAT/UREA NIT SERPL: 13
EOSINOPHIL NFR BLD MANUAL: 3 %
ERYTHROCYTE [DISTWIDTH] IN BLOOD BY AUTOMATED COUNT: 13.3 % (ref 11.5–14.5)
GLUCOSE SERPL-MCNC: 97 MG/DL (ref 74–106)
GRANULOCYTES NFR BLD MANUAL: 34 % (ref 43–75)
HCT VFR BLD AUTO: 35.4 % (ref 35–46)
HGB BLD-MCNC: 11.3 GM/DL (ref 12–16)
LYMPHOCYTES NFR BLD MANUAL: 2 %
LYMPHOCYTES NFR BLD MANUAL: 47 % (ref 20.5–51.1)
MCH RBC QN AUTO: 28.7 PG (ref 26–34)
MCHC RBC AUTO-ENTMCNC: 31.9 GM/DL (ref 31–37)
MCV RBC AUTO: 89.8 FL (ref 80–100)
MICROCYTES BLD QL SMEAR: ABNORMAL
MONOCYTES NFR BLD MANUAL: 13 % (ref 2–9)
PLATELET # BLD AUTO: 245 X10(3)/MCL (ref 130–400)
PLATELET # BLD EST: ADEQUATE 10*3/UL
PMV BLD AUTO: 10.2 FL (ref 7.4–10.4)
POTASSIUM SERPL-SCNC: 3.8 MMOL/L (ref 3.5–5.1)
RBC # BLD AUTO: 3.94 X10(6)/MCL (ref 4–5.2)
RBC MORPH BLD: ABNORMAL
SODIUM SERPL-SCNC: 145 MMOL/L (ref 136–145)
WBC # SPEC AUTO: 5 X10(3)/MCL (ref 4.5–11)

## 2019-03-12 ENCOUNTER — HISTORICAL (OUTPATIENT)
Dept: PHYSICAL THERAPY | Facility: HOSPITAL | Age: 49
End: 2019-03-12

## 2019-03-15 ENCOUNTER — HISTORICAL (OUTPATIENT)
Dept: PHYSICAL THERAPY | Facility: HOSPITAL | Age: 49
End: 2019-03-15

## 2019-03-19 ENCOUNTER — HISTORICAL (OUTPATIENT)
Dept: PHYSICAL THERAPY | Facility: HOSPITAL | Age: 49
End: 2019-03-19

## 2019-03-21 ENCOUNTER — HISTORICAL (OUTPATIENT)
Dept: PHYSICAL THERAPY | Facility: HOSPITAL | Age: 49
End: 2019-03-21

## 2019-03-26 ENCOUNTER — HISTORICAL (OUTPATIENT)
Dept: PHYSICAL THERAPY | Facility: HOSPITAL | Age: 49
End: 2019-03-26

## 2019-03-28 ENCOUNTER — HISTORICAL (OUTPATIENT)
Dept: PHYSICAL THERAPY | Facility: HOSPITAL | Age: 49
End: 2019-03-28

## 2019-04-02 ENCOUNTER — HISTORICAL (OUTPATIENT)
Dept: PHYSICAL THERAPY | Facility: HOSPITAL | Age: 49
End: 2019-04-02

## 2019-04-04 ENCOUNTER — HISTORICAL (OUTPATIENT)
Dept: PHYSICAL THERAPY | Facility: HOSPITAL | Age: 49
End: 2019-04-04

## 2020-01-08 ENCOUNTER — HOSPITAL ENCOUNTER (EMERGENCY)
Facility: HOSPITAL | Age: 50
Discharge: HOME | End: 2020-01-08
Attending: EMERGENCY MEDICINE
Payer: MEDICAID

## 2020-01-08 VITALS
DIASTOLIC BLOOD PRESSURE: 77 MMHG | HEIGHT: 64 IN | TEMPERATURE: 98 F | SYSTOLIC BLOOD PRESSURE: 110 MMHG | HEART RATE: 71 BPM | OXYGEN SATURATION: 100 % | WEIGHT: 184 LBS | RESPIRATION RATE: 17 BRPM | BODY MASS INDEX: 31.41 KG/M2

## 2020-01-08 DIAGNOSIS — M25.539 WRIST PAIN: ICD-10-CM

## 2020-01-08 DIAGNOSIS — K44.9 HIATAL HERNIA: Primary | ICD-10-CM

## 2020-01-08 DIAGNOSIS — R10.13 EPIGASTRIC PAIN: ICD-10-CM

## 2020-01-08 DIAGNOSIS — R10.9 ABDOMINAL PAIN: ICD-10-CM

## 2020-01-08 DIAGNOSIS — M25.532 LEFT WRIST PAIN: ICD-10-CM

## 2020-01-08 LAB
ALBUMIN SERPL BCP-MCNC: 3.3 G/DL (ref 3.5–5.2)
ALP SERPL-CCNC: 95 U/L (ref 55–135)
ALT SERPL W/O P-5'-P-CCNC: 14 U/L (ref 10–44)
ANION GAP SERPL CALC-SCNC: 8 MMOL/L (ref 8–16)
AST SERPL-CCNC: 12 U/L (ref 10–40)
B-HCG UR QL: NEGATIVE
BACTERIA #/AREA URNS HPF: ABNORMAL /HPF
BASOPHILS # BLD AUTO: 0.01 K/UL (ref 0–0.2)
BASOPHILS NFR BLD: 0.2 % (ref 0–1.9)
BILIRUB SERPL-MCNC: 0.3 MG/DL (ref 0.1–1)
BILIRUB UR QL STRIP: NEGATIVE
BUN SERPL-MCNC: 11 MG/DL (ref 6–20)
CALCIUM SERPL-MCNC: 9.1 MG/DL (ref 8.7–10.5)
CHLORIDE SERPL-SCNC: 106 MMOL/L (ref 95–110)
CLARITY UR: CLEAR
CO2 SERPL-SCNC: 26 MMOL/L (ref 23–29)
COLOR UR: YELLOW
CREAT SERPL-MCNC: 1.2 MG/DL (ref 0.5–1.4)
CTP QC/QA: YES
DIFFERENTIAL METHOD: NORMAL
EOSINOPHIL # BLD AUTO: 0.2 K/UL (ref 0–0.5)
EOSINOPHIL NFR BLD: 3.2 % (ref 0–8)
ERYTHROCYTE [DISTWIDTH] IN BLOOD BY AUTOMATED COUNT: 12.9 % (ref 11.5–14.5)
EST. GFR  (AFRICAN AMERICAN): >60 ML/MIN/1.73 M^2
EST. GFR  (NON AFRICAN AMERICAN): 53 ML/MIN/1.73 M^2
GLUCOSE SERPL-MCNC: 148 MG/DL (ref 70–110)
GLUCOSE UR QL STRIP: NEGATIVE
HCT VFR BLD AUTO: 40.8 % (ref 37–48.5)
HGB BLD-MCNC: 13.2 G/DL (ref 12–16)
HGB UR QL STRIP: ABNORMAL
KETONES UR QL STRIP: NEGATIVE
LEUKOCYTE ESTERASE UR QL STRIP: NEGATIVE
LIPASE SERPL-CCNC: 49 U/L (ref 4–60)
LYMPHOCYTES # BLD AUTO: 2.1 K/UL (ref 1–4.8)
LYMPHOCYTES NFR BLD: 45.7 % (ref 18–48)
MCH RBC QN AUTO: 29.3 PG (ref 27–31)
MCHC RBC AUTO-ENTMCNC: 32.4 G/DL (ref 32–36)
MCV RBC AUTO: 91 FL (ref 82–98)
MICROSCOPIC COMMENT: ABNORMAL
MONOCYTES # BLD AUTO: 0.4 K/UL (ref 0.3–1)
MONOCYTES NFR BLD: 8.5 % (ref 4–15)
NEUTROPHILS # BLD AUTO: 2 K/UL (ref 1.8–7.7)
NEUTROPHILS NFR BLD: 42.4 % (ref 38–73)
NITRITE UR QL STRIP: NEGATIVE
PH UR STRIP: 6 [PH] (ref 5–8)
PLATELET # BLD AUTO: 236 K/UL (ref 150–350)
PMV BLD AUTO: 10 FL (ref 9.2–12.9)
POTASSIUM SERPL-SCNC: 4 MMOL/L (ref 3.5–5.1)
PROT SERPL-MCNC: 6.8 G/DL (ref 6–8.4)
PROT UR QL STRIP: NEGATIVE
RBC # BLD AUTO: 4.51 M/UL (ref 4–5.4)
RBC #/AREA URNS HPF: 10 /HPF (ref 0–4)
SODIUM SERPL-SCNC: 140 MMOL/L (ref 136–145)
SP GR UR STRIP: 1.02 (ref 1–1.03)
SQUAMOUS #/AREA URNS HPF: 10 /HPF
URN SPEC COLLECT METH UR: ABNORMAL
UROBILINOGEN UR STRIP-ACNC: NEGATIVE EU/DL
WBC # BLD AUTO: 4.68 K/UL (ref 3.9–12.7)
WBC #/AREA URNS HPF: 1 /HPF (ref 0–5)
WBC CLUMPS URNS QL MICRO: ABNORMAL
YEAST URNS QL MICRO: ABNORMAL

## 2020-01-08 PROCEDURE — 25000003 PHARM REV CODE 250: Performed by: EMERGENCY MEDICINE

## 2020-01-08 PROCEDURE — 25500020 PHARM REV CODE 255: Performed by: EMERGENCY MEDICINE

## 2020-01-08 PROCEDURE — 80053 COMPREHEN METABOLIC PANEL: CPT

## 2020-01-08 PROCEDURE — 81000 URINALYSIS NONAUTO W/SCOPE: CPT

## 2020-01-08 PROCEDURE — 85025 COMPLETE CBC W/AUTO DIFF WBC: CPT

## 2020-01-08 PROCEDURE — 63600175 PHARM REV CODE 636 W HCPCS: Performed by: EMERGENCY MEDICINE

## 2020-01-08 PROCEDURE — 81025 URINE PREGNANCY TEST: CPT | Performed by: EMERGENCY MEDICINE

## 2020-01-08 PROCEDURE — 99285 EMERGENCY DEPT VISIT HI MDM: CPT | Mod: 25

## 2020-01-08 PROCEDURE — 83690 ASSAY OF LIPASE: CPT

## 2020-01-08 RX ORDER — PANTOPRAZOLE SODIUM 20 MG/1
20 TABLET, DELAYED RELEASE ORAL DAILY
Qty: 30 TABLET | Refills: 0 | Status: SHIPPED | OUTPATIENT
Start: 2020-01-08 | End: 2023-03-18

## 2020-01-08 RX ORDER — ONDANSETRON 4 MG/1
4 TABLET, ORALLY DISINTEGRATING ORAL
Status: COMPLETED | OUTPATIENT
Start: 2020-01-08 | End: 2020-01-08

## 2020-01-08 RX ORDER — SODIUM CHLORIDE 9 MG/ML
1000 INJECTION, SOLUTION INTRAVENOUS
Status: COMPLETED | OUTPATIENT
Start: 2020-01-08 | End: 2020-01-08

## 2020-01-08 RX ORDER — ACETAMINOPHEN 325 MG/1
650 TABLET ORAL
Status: COMPLETED | OUTPATIENT
Start: 2020-01-08 | End: 2020-01-08

## 2020-01-08 RX ORDER — ONDANSETRON 4 MG/1
4 TABLET, ORALLY DISINTEGRATING ORAL EVERY 8 HOURS PRN
Qty: 15 TABLET | Refills: 0 | OUTPATIENT
Start: 2020-01-08 | End: 2021-10-29

## 2020-01-08 RX ADMIN — IOHEXOL 100 ML: 350 INJECTION, SOLUTION INTRAVENOUS at 01:01

## 2020-01-08 RX ADMIN — ONDANSETRON 4 MG: 4 TABLET, ORALLY DISINTEGRATING ORAL at 12:01

## 2020-01-08 RX ADMIN — ACETAMINOPHEN 650 MG: 325 TABLET ORAL at 12:01

## 2020-01-08 RX ADMIN — SODIUM CHLORIDE 1000 ML: 0.9 INJECTION, SOLUTION INTRAVENOUS at 12:01

## 2020-01-08 RX ADMIN — LIDOCAINE HYDROCHLORIDE: 20 SOLUTION ORAL; TOPICAL at 12:01

## 2020-01-08 NOTE — ED TRIAGE NOTES
Pt complains of generalized abd pain intermittent x 2 weeks, pt also states she had trip and fall yesterday and complains of left wrist pain, slight swelling noted, +2 pulse noted

## 2020-01-08 NOTE — ED PROVIDER NOTES
Encounter Date: 1/8/2020    SCRIBE #1 NOTE: I, Michelle Ahumada, am scribing for, and in the presence of,  Dr. Denny. I have scribed the entire note.       History     Chief Complaint   Patient presents with    Abdominal Pain     pt presents to ED today c/o vomiting after eating x 2 week     Fall     pt reprots fall yesterday onto left side of body. pt reports swelling to left wrist + radial pulse and cap refill      Time seen by provider: 11:01 AM    This is a 49 y.o. female with a history of GERD, HTN, and DVT who presents to the ED with chief complaint of mid abdominal pain for about 2 weeks and left wrist pain since last night. Patient reports to have a constant abdominal cramping with associated nausea and vomiting for 2 weeks. She notes she is unable to tolerate any solid PO intake as it leads her to vomit. She notes the cramping pain may occasionally radiate to her back. She notes that before the vomiting, pt would use the restroom immediately after eating. She notes her BM are soft but denies diarrhea. Denies history of hernia. Pt had a cholecystectomy about a year ago. Pt also complains of left wrist pain after falling and landing on her left side in her bathtub last night. She notes to have hit her wrist against the railing in the bathroom. She noticed slight swelling to the area.    The history is provided by the patient.     Review of patient's allergies indicates:   Allergen Reactions    Corticosteroids (glucocorticoids) Swelling     Past Medical History:   Diagnosis Date    Abnormal Pap smear of cervix     5 years ago    DVT (deep venous thrombosis) 06/2018    left leg    Hypertension      Past Surgical History:   Procedure Laterality Date    HYSTERECTOMY      LAPAROSCOPIC CHOLECYSTECTOMY N/A 8/31/2018    Procedure: CHOLECYSTECTOMY, LAPAROSCOPIC;  Surgeon: Aki Watt MD;  Location: [facility] OR;  Service: General;  Laterality: N/A;  No PAT Available-Anes in AM  VIDEO     TONSILLECTOMY, ADENOIDECTOMY  06/2017    TUBAL LIGATION       History reviewed. No pertinent family history.  Social History     Tobacco Use    Smoking status: Never Smoker    Smokeless tobacco: Never Used   Substance Use Topics    Alcohol use: No    Drug use: No     Review of Systems   Constitutional: Negative for chills and fever.   HENT: Negative for congestion, rhinorrhea and sore throat.    Eyes: Negative for redness and visual disturbance.   Respiratory: Negative for cough, shortness of breath and wheezing.    Cardiovascular: Negative for chest pain and palpitations.   Gastrointestinal: Positive for abdominal pain, nausea and vomiting. Negative for diarrhea.   Genitourinary: Negative for dysuria and hematuria.   Musculoskeletal: Positive for arthralgias, back pain, joint swelling and myalgias. Negative for neck pain.   Skin: Negative for rash.   Neurological: Negative for dizziness, weakness and light-headedness.   Psychiatric/Behavioral: Negative for confusion.   All other systems reviewed and are negative.      Physical Exam     Initial Vitals   BP Pulse Resp Temp SpO2   01/08/20 1125 01/08/20 1125 01/08/20 1206 01/08/20 1157 01/08/20 1125   119/75 85 19 97.8 °F (36.6 °C) 98 %      MAP       --                Physical Exam    Nursing note and vitals reviewed.  Constitutional: She appears well-developed and well-nourished. No distress.   HENT:   Head: Normocephalic and atraumatic.   Mouth/Throat: Oropharynx is clear and moist.   Eyes: Conjunctivae and EOM are normal. Pupils are equal, round, and reactive to light.   Neck: Normal range of motion. Neck supple. No stridor present. No tracheal deviation present.   Cardiovascular: Normal rate, regular rhythm and normal heart sounds.   No murmur heard.  Pulmonary/Chest: Breath sounds normal. No respiratory distress.   Abdominal: Soft. Bowel sounds are normal. There is tenderness. There is no rebound and no guarding.   Mild mid epigastric tenderness  No  rebound  No guarding  No peritoneal signs  Normal bowel sounds in all four quadrants   Musculoskeletal: Normal range of motion. She exhibits tenderness. She exhibits no edema.        Left wrist: She exhibits tenderness and swelling.   Tenderness to the radial aspect of the left wrist with active ROM. Mild swelling to the distal, radial aspect of the left wrist. No so-called anatomical snuffbox tenderness concerning for scaphoid fx.    Neurological: She is alert and oriented to person, place, and time. No sensory deficit.   Skin: Skin is warm and dry. Capillary refill takes less than 2 seconds.   Psychiatric: She has a normal mood and affect. Her behavior is normal.         ED Course   Procedures  Labs Reviewed   COMPREHENSIVE METABOLIC PANEL - Abnormal; Notable for the following components:       Result Value    Glucose 148 (*)     Albumin 3.3 (*)     eGFR if non  53 (*)     All other components within normal limits   URINALYSIS, REFLEX TO URINE CULTURE - Abnormal; Notable for the following components:    Occult Blood UA 1+ (*)     All other components within normal limits    Narrative:     Preferred Collection Type->Urine, Clean Catch   URINALYSIS MICROSCOPIC - Abnormal; Notable for the following components:    RBC, UA 10 (*)     All other components within normal limits    Narrative:     Preferred Collection Type->Urine, Clean Catch   CBC W/ AUTO DIFFERENTIAL   LIPASE   POCT URINE PREGNANCY          X-Rays:   Independently Interpreted Readings:   Other Readings:  Reviewed by myself, read by radiology.    Imaging Results          CT Abdomen Pelvis With Contrast (Final result)  Result time 01/08/20 13:45:39    Final result by Jadiel Arango MD (01/08/20 13:45:39)                 Impression:      1. Findings suggesting bilateral nephrocalcinosis noting mild prominence of the right renal collecting system.  No definite right ureteral calculi seen.  Clinical correlation is recommended with  urinalysis although the appearance is similar to the previous examination.  2. Findings may reflect hepatic steatosis, correlation with LFTs advised.  3. Colonic diverticulosis without diverticulitis.  4. Small amount of fluid in the endometrial canal, correlation recommended.  5. Additional findings above.      Electronically signed by: Jadiel Arango MD  Date:    01/08/2020  Time:    13:45             Narrative:    EXAMINATION:  CT ABDOMEN PELVIS WITH CONTRAST    CLINICAL HISTORY:  Abdominal distension;Nausea, vomiting, diarrhea;    TECHNIQUE:  Low dose axial images, sagittal and coronal reformations were obtained from the lung bases to the pubic symphysis following the IV administration of 75 mL of Omnipaque 350 .  Oral contrast was not given.    COMPARISON:  08/16/2018    FINDINGS:  Images of the lower thorax are remarkable for minimal dependent atelectasis.    The liver is hypoattenuating, suggesting phase of contrast however correlation with LFTs is recommended as this could reflect steatosis.  The spleen, pancreas and adrenal glands are unremarkable.  The gallbladder is surgically absent.  There is no significant biliary dilation or ascites.  The pancreatic duct is not dilated.  There is a minimal hiatal hernia.  The portal vein, splenic vein, SMV, celiac axis and SMA all are patent.  No significant abdominal lymphadenopathy.    There is cholelithiasis/bilateral nephrocalcinosis.  There is a low attenuating lesion arising from the interpolar region of the right kidney measuring 1.4 cm, too small for characterization.  The kidneys enhance symmetrically without left hydronephrosis.  There is mild prominence of the right renal collecting system, stable.  There is a low attenuating lesion arising from the upper pole of the left kidney, too small for characterization.  There is apparent scarring involving the interpolar region of the left kidney, and lower pole and interpolar regions of the right kidney noting  overall lobular renal contour bilaterally.  The bilateral ureters are unable to be followed in their entirety to the urinary bladder, no definite calculi seen.  The urinary bladder is unremarkable.  There is a small amount of fluid in the cervical canal.  The adnexa is unremarkable.    There are a few scattered colonic diverticula without colonic wall thickening or inflammation.  The terminal ileum and appendix are unremarkable.  The small bowel is grossly unremarkable.  Scattered shotty periaortic and paracaval lymph nodes are noted.  No focal organized pelvic fluid collection.    Degenerative changes are noted of the spine.  No significant inguinal lymphadenopathy.                               X-Ray Abdomen Flat And Erect (Final result)  Result time 01/08/20 12:37:14    Final result by Jadiel Arango MD (01/08/20 12:37:14)                 Impression:      1. Moderate stool in the colon, no findings to suggest high-grade obstruction.  2. Multiple calcifications projected in the region of the kidneys bilaterally, better evaluated 08/16/2018.      Electronically signed by: Jadiel Arango MD  Date:    01/08/2020  Time:    12:37             Narrative:    EXAMINATION:  XR ABDOMEN FLAT AND ERECT    CLINICAL HISTORY:  Unspecified abdominal pain    TECHNIQUE:  Flat and erect AP views of the abdomen were performed.    COMPARISON:  12/05/2017, CT 08/16/2018    FINDINGS:  Two upright views, 2 supine views.    No significant air-fluid levels on upright view.  Air and stool is seen within the large bowel and projected over the rectum noting moderate to large amount of stool within the colon.  Colonic diverticula noted.  No focally dilated small bowel loops.  No large volume free air or pneumatosis.  Surgical changes noted of the right upper quadrant and lower pelvis.  There are multiple calcifications in the region of the kidneys, better evaluated on CT 08/16/2018.  The lower lung zones are grossly clear.  The osseous  "structures are grossly intact.                               X-Ray Wrist Complete Left (Final result)  Result time 01/08/20 12:38:57    Final result by Jadiel Arango MD (01/08/20 12:38:57)                 Impression:      1. No acute displaced fracture or dislocation of the left wrist.      Electronically signed by: Jadiel Arango MD  Date:    01/08/2020  Time:    12:38             Narrative:    EXAMINATION:  XR WRIST COMPLETE 3 VIEWS LEFT    CLINICAL HISTORY:  Pain in unspecified wrist    TECHNIQUE:  PA, lateral, and oblique views of the left wrist were performed.    COMPARISON:  None    FINDINGS:  Three views left wrist.    No acute displaced fracture or dislocation of the wrist.  No radiopaque foreign body.  No significant edema.                              Medical Decision Making:   Clinical Tests:   Radiological Study: Ordered and Reviewed  ED Management:  - Pt well-appearing, non-toxic; no acute distress  - AXR without free air, dilated loops of bowel, or air fluid levels; no acute process per my interpretation, final radiology read   - Plain radiograph of L wrist without acute fracture, dislocation or other osseous abnormality per my interpretation, final radiology read  - UPT neg   - CBC w/diff WNL: H/H stable; no significant leukocytosis  - CMP WNL; no significant electrolyte abnormality noted; Cr WNL: GFR >60  - UA without findings suggestive of infectious process  - Lipase WNL  - Pt administered GI cocktail, APAP   - CT abd/pelvis demonstrates possible bilateral nephrocalcinosis noting mild prominence of the right renal collecting system; no definite right ureteral calculi seen; hepatic steatosis; diverticulosis w/o diverticulitis per final radiology read; small hiatal hernia  - will start on PPI and refer pt to Gen Surg for hiatal hernia   - will recommend symptomatic care ("RICE" therapy) for wrist swelling; no snuff box tenderness noted   - VSS; pt able to tolerate PO for me prior to " discharge  - No further intervention is indicated at this time after having taken into account the patient's history, physical exam findings, and empirical and objective data obtained during the patient's emergency department workup.   - The patient is at low risk for an emergent medical condition at this time, and I am of the belief that that it is safe to discharge the patient from the emergency department.   - The patient is instructed to follow up as outpatient as indicated on the discharge paperwork.    - I have discussed the specifics of the workup with the patient and the patient has verbalized understanding of the details of the workup, the diagnosis, the treatment plan, and the need for outpatient follow-up.    - Although the patient has no emergent etiology today this does not preclude the development of an emergent condition so, in addition, I have advised the patient that they can return to the ED and/or activate EMS at any time with worsening of their symptoms, change of their symptoms, or with any other medical complaint.    - The patient remained comfortable and stable during their visit in the ED.    - Discharge and follow-up instructions discussed with the patient who expressed understanding and willingness to comply with my recommendations.  - Results of all emergency department tests  discussed thoroughly with patient; all patient questions answered; pt in agreement with plan  - Pt instructed to follow up with PCP in 2-3 days for recheck of today's complaints  - Pt given strict emergency department return precautions for any new or worsening of symptoms  - Pt discharged from the emergency department in stable condition, in no acute distress                                   Clinical Impression:     1. Hiatal hernia    2. Abdominal pain    3. Wrist pain    4. Left wrist pain    5. Epigastric pain        Disposition:   Disposition: Discharged  Condition: Stable      I, Reji Denny,  personally  performed the services described in this documentation. All medical record entries made by the scribe were at my direction and in my presence.  I have reviewed the chart and agree that the record reflects my personal performance and is accurate and complete. Reji Denny M.D. 5:05 PM01/08/2020               Reji Denny MD  01/08/20 3042

## 2020-01-17 ENCOUNTER — OFFICE VISIT (OUTPATIENT)
Dept: SURGERY | Facility: CLINIC | Age: 50
End: 2020-01-17
Payer: MEDICAID

## 2020-01-17 VITALS
SYSTOLIC BLOOD PRESSURE: 134 MMHG | HEART RATE: 86 BPM | HEIGHT: 64 IN | BODY MASS INDEX: 33.64 KG/M2 | TEMPERATURE: 98 F | WEIGHT: 197.06 LBS | DIASTOLIC BLOOD PRESSURE: 61 MMHG

## 2020-01-17 DIAGNOSIS — K21.9 HIATAL HERNIA WITH GASTROESOPHAGEAL REFLUX: Primary | ICD-10-CM

## 2020-01-17 DIAGNOSIS — K44.9 HIATAL HERNIA WITH GASTROESOPHAGEAL REFLUX: Primary | ICD-10-CM

## 2020-01-17 DIAGNOSIS — Z90.49 S/P LAPAROSCOPIC CHOLECYSTECTOMY: ICD-10-CM

## 2020-01-17 PROCEDURE — 99213 OFFICE O/P EST LOW 20 MIN: CPT | Mod: PBBFAC,PO | Performed by: STUDENT IN AN ORGANIZED HEALTH CARE EDUCATION/TRAINING PROGRAM

## 2020-01-17 PROCEDURE — 99999 PR PBB SHADOW E&M-EST. PATIENT-LVL III: ICD-10-PCS | Mod: PBBFAC,,, | Performed by: STUDENT IN AN ORGANIZED HEALTH CARE EDUCATION/TRAINING PROGRAM

## 2020-01-17 PROCEDURE — 99999 PR PBB SHADOW E&M-EST. PATIENT-LVL III: CPT | Mod: PBBFAC,,, | Performed by: STUDENT IN AN ORGANIZED HEALTH CARE EDUCATION/TRAINING PROGRAM

## 2020-01-17 PROCEDURE — 99204 OFFICE O/P NEW MOD 45 MIN: CPT | Mod: S$PBB,,, | Performed by: STUDENT IN AN ORGANIZED HEALTH CARE EDUCATION/TRAINING PROGRAM

## 2020-01-17 PROCEDURE — 99204 PR OFFICE/OUTPT VISIT, NEW, LEVL IV, 45-59 MIN: ICD-10-PCS | Mod: S$PBB,,, | Performed by: STUDENT IN AN ORGANIZED HEALTH CARE EDUCATION/TRAINING PROGRAM

## 2020-01-17 NOTE — PROGRESS NOTES
History & Physical  Surgery      SUBJECTIVE:     Chief Complaint/Reason for Admission: Hiatal Hernia    History of Present Illness: Lorelei Norris is a 49 y.o. female with hx of DVT (no longer on xarelto) presents to clinic after recent visit to ED for abdominal pain, nausea, and vomiting. She states she dealt with the pain and symptoms for 2 weeks prior to presenting to the ED. Never had an episode like this before. States the pain is located in the upper central abdomen. It is exacerbated by eating but does occur spontaneously and is relieved by pressing on the area. She also reports that her food gets stuck in the lower chest/upper abdomen when she eats which is accompanied by nausea and sometimes regurgitation.   Work up in the ED including a CT scan which showed a small hiatal hernia.       Current Outpatient Medications on File Prior to Visit   Medication Sig    albuterol 90 mcg/actuation inhaler Inhale 2 puffs into the lungs every 6 (six) hours as needed for Wheezing. Rescue    epinephrine (EPIPEN) 0.3 mg/0.3 mL AtIn Inject 0.3 mLs (0.3 mg total) into the muscle as needed.    lisinopril 10 MG tablet Take 1 tablet (10 mg total) by mouth once daily.    naproxen (NAPROSYN) 250 MG tablet     ondansetron (ZOFRAN) 4 MG tablet Take 1 tablet (4 mg total) by mouth every 6 (six) hours.    ondansetron (ZOFRAN-ODT) 4 MG TbDL Take 1 tablet (4 mg total) by mouth every 8 (eight) hours as needed (nausea).    pantoprazole (PROTONIX) 20 MG tablet Take 1 tablet (20 mg total) by mouth once daily.    XARELTO 20 mg Tab     HYDROcodone-acetaminophen (NORCO) 5-325 mg per tablet Take 1 tablet by mouth every 6 (six) hours as needed for Pain. (Patient not taking: Reported on 1/17/2020)     Current Facility-Administered Medications on File Prior to Visit   Medication    lidocaine (PF) 10 mg/ml (1%) injection 10 mg       Review of patient's allergies indicates:   Allergen Reactions    Corticosteroids (glucocorticoids)  Swelling       Past Medical History:   Diagnosis Date    Abnormal Pap smear of cervix     5 years ago    DVT (deep venous thrombosis) 06/2018    left leg    Hypertension      Past Surgical History:   Procedure Laterality Date    HYSTERECTOMY      LAPAROSCOPIC CHOLECYSTECTOMY N/A 8/31/2018    Procedure: CHOLECYSTECTOMY, LAPAROSCOPIC;  Surgeon: Aki Watt MD;  Location: [facility];  Service: General;  Laterality: N/A;  No PAT Available-Anes in AM  VIDEO    TONSILLECTOMY, ADENOIDECTOMY  06/2017    TUBAL LIGATION       No family history on file.  Social History     Tobacco Use    Smoking status: Never Smoker    Smokeless tobacco: Never Used   Substance Use Topics    Alcohol use: No    Drug use: No        Review of Systems   Constitutional: Negative for chills, diaphoresis and fever.   HENT: Negative for congestion and sore throat.    Respiratory: Positive for shortness of breath (at night, in bed). Negative for wheezing.    Cardiovascular: Negative for chest pain and palpitations.   Gastrointestinal: Positive for abdominal distention, abdominal pain (upper middle abdomen), nausea and vomiting. Negative for constipation and diarrhea.   Genitourinary: Positive for dysuria (history of kidney stones). Negative for difficulty urinating and frequency.   Musculoskeletal: Negative for arthralgias and myalgias.   Skin: Negative for color change and wound.   Neurological: Negative for light-headedness and headaches.   Psychiatric/Behavioral: Negative for confusion and hallucinations.     OBJECTIVE:     Vital Signs (Most Recent)  Temp: 98.2 °F (36.8 °C) (01/17/20 0841)  Pulse: 86 (01/17/20 0841)  BP: 134/61 (01/17/20 0841)    Physical Exam   Constitutional: She is oriented to person, place, and time. She appears well-developed and well-nourished. No distress.   HENT:   Head: Normocephalic and atraumatic.   Eyes: Conjunctivae and EOM are normal.   Neck: Normal range of motion. Neck supple.   Cardiovascular: Normal  rate, regular rhythm and intact distal pulses.   Pulmonary/Chest: Effort normal. No respiratory distress.   Abdominal: Soft. She exhibits no distension. There is tenderness (Epigastric, RUQ, RLQ, LUQ). There is no rebound and no guarding.   Musculoskeletal: Normal range of motion. She exhibits no edema.   Neurological: She is alert and oriented to person, place, and time. No cranial nerve deficit.   Skin: Skin is warm and dry.   Psychiatric: She has a normal mood and affect. Thought content normal.   Vitals reviewed.    Laboratory  ED labs reviewed: CBC, CMP, Lipase    Diagnostic Results:  CT scan reviewed       The liver is hypoattenuating, suggesting phase of contrast however correlation with LFTs is recommended as this could reflect steatosis.  The spleen, pancreas and adrenal glands are unremarkable.  The gallbladder is surgically absent.  There is no significant biliary dilation or ascites.  The pancreatic duct is not dilated.  There is a minimal hiatal hernia.  The portal vein, splenic vein, SMV, celiac axis and SMA all are patent.  No significant abdominal lymphadenopathy.    There is cholelithiasis/bilateral nephrocalcinosis.  There is a low attenuating lesion arising from the interpolar region of the right kidney measuring 1.4 cm, too small for characterization.  The kidneys enhance symmetrically without left hydronephrosis.  There is mild prominence of the right renal collecting system, stable.  There is a low attenuating lesion arising from the upper pole of the left kidney, too small for characterization.  There is apparent scarring involving the interpolar region of the left kidney, and lower pole and interpolar regions of the right kidney noting overall lobular renal contour bilaterally.  The bilateral ureters are unable to be followed in their entirety to the urinary bladder, no definite calculi seen.  The urinary bladder is unremarkable.  There is a small amount of fluid in the cervical canal.  The  adnexa is unremarkable.    There are a few scattered colonic diverticula without colonic wall thickening or inflammation.  The terminal ileum and appendix are unremarkable.  The small bowel is grossly unremarkable.  Scattered shotty periaortic and paracaval lymph nodes are noted.  No focal organized pelvic fluid collection.    Degenerative changes are noted of the spine.  No significant inguinal lymphadenopathy.                  ASSESSMENT/PLAN:     A/P: 48 yo F presents with small Type I Hiatal Hernia after ED visit for symptoms possibly related to her hernia.    Ultimately patient needs further work up before any surgical intervention.  Ordered barium swallow, EGD (referral to GI), and esophageal manometry.  Follow up after completion of above studies.  Reports she has not taken xarelto in 2 years.    Bill Braun MD  Surgery PGY-2  [phone]

## 2020-01-17 NOTE — LETTER
January 17, 2020      Reji Denny MD  [address] [place] [address]           [facility] Surgery  [address], [address]  [facility] [address]  Phone: [phone]          Patient: Lorelei Norris   MR Number: [id number]   YOB: 1970   Date of Visit: 1/17/2020       Dear Dr. Reji Denny:    Thank you for referring Lorelei Norris to me for evaluation. Attached you will find relevant portions of my assessment and plan of care.    If you have questions, please do not hesitate to call me. I look forward to following Lorelei Norris along with you.    Sincerely,    Jarad Perez MD    Enclosure  CC:  No Recipients    If you would like to receive this communication electronically, please contact [email or web address] or [phone] to request more information on [facility] [employer] access.    For providers and/or their staff who would like to refer a patient to [facility], please contact us through our one-stop-shop provider referral line, [facility], at [phone].    If you feel you have received this communication in error or would no longer like to receive these types of communications, please e-mail [email or web address]

## 2020-01-17 NOTE — PATIENT INSTRUCTIONS
EGD Prep Instructions    [facility] [address] [place] [address]    You are scheduled for an EGD with Dr. Baron on 2/5/2020 at [facility] located at [address] [place] [address].  Check in at the admit desk, first floor of the hospital (which is the building on the left).   You will receive a call 2-3 days before your EGD to tell you the time to arrive.  If you have not received a call by the day before your procedure, call the Endoscopy Lab at [phone].    Nothing to eat or drink after midnight before the procedure.  You MAY brush your teeth.    You MAY take your blood pressure, heart, and seizure medication on the morning of the procedure, with a SIP of water.  Hold ALL other medications until after the procedure.    If you are on blood thinners THAT YOU HAVE BEEN INSTRUCTED TO HOLD BY YOUR DOCTOR FOR THIS PROCEDURE, then do NOT take this the morning of your EGD.  Do NOT stop these medications on your own, they must be approved to be held by your doctor.  Your EGD can NOT be done if you are on these medications.  Examples of blood thinners include: Coumadin, Aggrenox, Plavix, Pradaxa, Reapro, Pletal, Xarelto, Ticagrelor, Brilinta, Eliquis, and high dose aspirin (325 mg).  You do not have to stop baby aspirin 81 mg.    Hold Xarelto on 2/3/20      You will receive a call 2-3 days before your EGD to tell you the time to arrive.  If you have not received a call by the day before your procedure, call the Endoscopy department at [phone].

## 2020-01-22 ENCOUNTER — TELEPHONE (OUTPATIENT)
Dept: GASTROENTEROLOGY | Facility: CLINIC | Age: 50
End: 2020-01-22

## 2020-01-22 NOTE — TELEPHONE ENCOUNTER
Spoke with patient about rescheduling her procedure. Patient scheduled on 2/26/2020 with Dr. Baron.

## 2020-01-22 NOTE — TELEPHONE ENCOUNTER
----- Message from Henna Castillo sent at 1/22/2020  8:11 AM CST -----  Contact: patient  Patient called to reschedule her procedure that is set for 02/05/20.    Please call [phone] to discuss today.

## 2020-02-21 ENCOUNTER — TELEPHONE (OUTPATIENT)
Dept: ENDOSCOPY | Facility: HOSPITAL | Age: 50
End: 2020-02-21

## 2020-05-19 ENCOUNTER — TELEPHONE (OUTPATIENT)
Dept: GASTROENTEROLOGY | Facility: CLINIC | Age: 50
End: 2020-05-19

## 2020-05-27 ENCOUNTER — TELEPHONE (OUTPATIENT)
Dept: GASTROENTEROLOGY | Facility: CLINIC | Age: 50
End: 2020-05-27

## 2020-06-12 ENCOUNTER — TELEPHONE (OUTPATIENT)
Dept: GASTROENTEROLOGY | Facility: CLINIC | Age: 50
End: 2020-06-12

## 2020-09-24 ENCOUNTER — TELEPHONE (OUTPATIENT)
Dept: NEUROLOGY | Facility: HOSPITAL | Age: 50
End: 2020-09-24

## 2020-09-24 NOTE — TELEPHONE ENCOUNTER
----- Message from Nidia Ortiz sent at 9/24/2020  3:42 PM CDT -----  JPKESHIA- Patient came into office to schedule with Dr. Hou. The patient stated she wants to schedule surgery with Dr. Hou in order for him to remove her hernia. I informed the patient that the nurse is currently in clinic, but she will be reaching out to her in a timely matter to get her scheduled.

## 2020-09-24 NOTE — TELEPHONE ENCOUNTER
Returned patients call. She advised that she was looking for Dr. Perez's office so she can get her Hernia surgery rescheduled. Provided patient with Ana's office contact information. Patient has no further questions at this time.

## 2020-09-25 ENCOUNTER — TELEPHONE (OUTPATIENT)
Dept: SURGERY | Facility: CLINIC | Age: 50
End: 2020-09-25

## 2020-09-25 NOTE — TELEPHONE ENCOUNTER
----- Message from Preethi Torres sent at 9/25/2020  8:17 AM CDT -----  Contact: SELF [phone]  Patient would like to speak with you about needing to schedule her surgery. Please advise    9/25/20  10:40am  Attempted to return patient's call. No answer. Mailbox full.

## 2020-10-15 ENCOUNTER — OFFICE VISIT (OUTPATIENT)
Dept: GASTROENTEROLOGY | Facility: CLINIC | Age: 50
End: 2020-10-15
Payer: MEDICAID

## 2020-10-15 VITALS
WEIGHT: 182.13 LBS | HEART RATE: 116 BPM | DIASTOLIC BLOOD PRESSURE: 72 MMHG | BODY MASS INDEX: 31.26 KG/M2 | SYSTOLIC BLOOD PRESSURE: 127 MMHG

## 2020-10-15 DIAGNOSIS — K58.1 IRRITABLE BOWEL SYNDROME WITH CONSTIPATION: Primary | ICD-10-CM

## 2020-10-15 DIAGNOSIS — K44.9 HIATAL HERNIA: ICD-10-CM

## 2020-10-15 DIAGNOSIS — Z01.812 PRE-PROCEDURE LAB EXAM: ICD-10-CM

## 2020-10-15 DIAGNOSIS — R13.10 DYSPHAGIA, UNSPECIFIED TYPE: ICD-10-CM

## 2020-10-15 DIAGNOSIS — Z12.11 COLON CANCER SCREENING: ICD-10-CM

## 2020-10-15 PROCEDURE — 99213 OFFICE O/P EST LOW 20 MIN: CPT | Mod: PBBFAC,PO | Performed by: INTERNAL MEDICINE

## 2020-10-15 PROCEDURE — 99204 OFFICE O/P NEW MOD 45 MIN: CPT | Mod: S$PBB,,, | Performed by: INTERNAL MEDICINE

## 2020-10-15 PROCEDURE — 99999 PR PBB SHADOW E&M-EST. PATIENT-LVL III: ICD-10-PCS | Mod: PBBFAC,,, | Performed by: INTERNAL MEDICINE

## 2020-10-15 PROCEDURE — 99204 PR OFFICE/OUTPT VISIT, NEW, LEVL IV, 45-59 MIN: ICD-10-PCS | Mod: S$PBB,,, | Performed by: INTERNAL MEDICINE

## 2020-10-15 PROCEDURE — 99999 PR PBB SHADOW E&M-EST. PATIENT-LVL III: CPT | Mod: PBBFAC,,, | Performed by: INTERNAL MEDICINE

## 2020-10-15 RX ORDER — SODIUM, POTASSIUM,MAG SULFATES 17.5-3.13G
1 SOLUTION, RECONSTITUTED, ORAL ORAL DAILY
Qty: 1 KIT | Refills: 0 | Status: SHIPPED | OUTPATIENT
Start: 2020-10-15 | End: 2020-10-17

## 2020-10-15 NOTE — PATIENT INSTRUCTIONS
SUPREP Instructions    [facility] [address]  Clinic Office [phone]  Endoscopy Lab [phone]    You are scheduled for a Colonoscopy with Dr.Christopher Baron on 11/25/2020 at [facility].  You will check in at the Information desk on the first floor of the hospital. Please contact the office to reschedule if needed at     Do not follow instructions listed in the box.     A responsible adult (family member or friend) must come with you and transport you home.  You are not allowed to drive, take a taxi/ride share or bus or leave the Endoscopy Center alone.  If you do not have a responsible adult with you to take you home, your exam will be cancelled.      Please follow instructions of  if you are taking anticoagulant/blood thinning medications such as Aggrenox, Brilinta, Effient, Eliquis, Lovenox, Plavix, Pletal, Pradaxa, Ticilid, Xarelto or Coumadin.      Please skip your morning dose of insulin or other oral medications for diabetes the morning of the procedure unless instructed otherwise by your doctor. You should take your blood pressure, heart, anti-rejection and or seizure medication the morning of your procedure.     To ensure that your test is accurate and complete, you must follow these instructions - please do not use the instructions provided from the pharmacy.     For your safety and the safety of the providers and staff, You will be required to have a screening Covid test 72 hours before procedure.    Do not follow instructions listed in the box.      The Day Before The Procedure:     Follow a CLEAR LIQUID DIET for the entire day before your scheduled colonoscopy.  This means no solid food the entire day.   A clear liquid diet includes the following:  o Water, Coffee or decaffeinated coffee (no milk or cream)  o Tea, Herbal tea  o Carbonated beverages (soft drinks), regular and sugar free  o Gelatin  o Apple Juice, white grape  juice, white cranberry juice  o Gatorade, Power Aid, Crystal Light, Chandan Aid (Yellow, Green, Clear)  o Lemonade and Limeade  o Bouillon, clear consomme'  o Snowball, popsicles  (Yellow, Green, Clear)    DO NOT DRINK ANY LIQUIDS CONTAINING RED DYE  DO NOT DRINK ANY LIQUIDS NOT SPECIFICALLY LISTED  DO NOT DRINK ALCOHOL     At 5 pm the evening before your colonoscopy:  o Pour one (1) bottle of SUPREP into the container provided in the box. Add water to the line on the container and mix well.  Drink the whole container and then drink 2 more containers of water over 1 hour.  - This is sometimes easier to drink if this solution is cold, so you can mix the solution 20 minutes ahead of time and place in the refrigerator prior to drinking.  You have to drink the solution within 30-45 minutes of mixing it.  Do NOT put this solution over ice.  It IS ok to drink with a straw.    The Day of the Procedure - The Endoscopy department will call you 2 days prior to your procedure to give you the exact time     5 hours prior to your ARRIVAL TIME (this may be in the middle of the night)  o Pour the 2nd bottle of SUPREP into the container provided in the box.  Add water to the line on the container and mix well.  Drink the whole container and then drink 2 more containers of water over 1 hour.    o AFTER YOU HAVE COMPLETED YOUR PREP, YOU MAY HAVE NOTHING ELSE BY MOUTH    o Please leave all valuables and jewelry at home.    o At 600 am, you may take the last dose of any medications you are allowed to take with a small sip of water (blood pressure, heart, anti-rejection and or seizure medication).  If you use inhalers bring them with you.    o You may call the Endoscopy department at [phone] with any questions regarding your procedure.

## 2020-10-15 NOTE — PROGRESS NOTES
Subjective:       Patient ID: Lorelei Norris is a 50 y.o. female.    Chief Complaint: Follow-up    Patient here today to establish care with aforementioned complaints.  Patient reports history of abdominal pain that began approximately 1 year ago.  She characterizes pain is constant with associated nausea, vomiting, and constipation.  Pain originates in her epigastric and can radiate to her pelvis/and her back.  It does seem to be worsened by eating as well as having a bowel movement.  She has attempted to treat her complaints by increasing her fiber/water intake in addition to over-the-counter MiraLax and stool softeners without significant improvement.  She denies prior endoscopy.  She does report history of hiatal hernia which was diagnosed radiographically.    Patient apparently had an appointment to see me in February, however had to cancel due to death of her grandson.      Past Medical History:   Diagnosis Date    Abnormal Pap smear of cervix     5 years ago    DVT (deep venous thrombosis) 06/2018    left leg    Hypertension        Past Surgical History:   Procedure Laterality Date    HYSTERECTOMY      LAPAROSCOPIC CHOLECYSTECTOMY N/A 8/31/2018    Procedure: CHOLECYSTECTOMY, LAPAROSCOPIC;  Surgeon: Aki Watt MD;  Location: [facility];  Service: General;  Laterality: N/A;  No PAT Available-Anes in AM  VIDEO    TONSILLECTOMY, ADENOIDECTOMY  06/2017    TUBAL LIGATION         Social History  Social History     Tobacco Use    Smoking status: Never Smoker    Smokeless tobacco: Never Used   Substance Use Topics    Alcohol use: No    Drug use: No       No family history on file.    Review of Systems   Constitutional: Negative for chills, fever and unexpected weight change.   HENT: Negative for congestion and trouble swallowing.    Eyes: Negative for photophobia and visual disturbance.   Respiratory: Negative for cough and shortness of breath.    Cardiovascular: Negative for chest pain and leg  swelling.   Gastrointestinal: Positive for abdominal pain, constipation and nausea. Negative for abdominal distention, blood in stool, diarrhea and vomiting.   Genitourinary: Negative for dysuria and hematuria.   Musculoskeletal: Positive for arthralgias and back pain. Negative for myalgias.   Skin: Negative for color change and rash.   Neurological: Negative for dizziness, light-headedness and numbness.   Psychiatric/Behavioral: Negative for agitation and confusion. The patient is nervous/anxious.            Objective:      Physical Exam  Vitals signs and nursing note reviewed.   Constitutional:       Appearance: She is well-developed.   HENT:      Head: Normocephalic and atraumatic.   Eyes:      General: No scleral icterus.     Pupils: Pupils are equal, round, and reactive to light.   Neck:      Musculoskeletal: Normal range of motion and neck supple.   Cardiovascular:      Rate and Rhythm: Normal rate and regular rhythm.      Heart sounds: Normal heart sounds.   Pulmonary:      Effort: Pulmonary effort is normal. No respiratory distress.      Breath sounds: Normal breath sounds.   Abdominal:      General: Bowel sounds are normal. There is no distension.      Palpations: Abdomen is soft.      Tenderness: There is no abdominal tenderness.   Musculoskeletal: Normal range of motion.   Skin:     General: Skin is warm and dry.      Findings: No erythema or rash.   Neurological:      Mental Status: She is alert and oriented to person, place, and time.      Comments: No asterixis   Psychiatric:         Behavior: Behavior normal.           Pertinent labs and imaging studies reviewed    Assessment:       1. Irritable bowel syndrome with constipation    2. Colon cancer screening    3. Dysphagia, unspecified type    4. Hiatal hernia    5. Pre-procedure lab exam        Plan:       Place on trial of Linzess  Sent for EGD EGD to evaluate for intraluminal causes of dysphagia  Patient is due for colon cancer screening, can be  performed same day    (Portions of this note were dictated using voice recognition software and may contain dictation related errors in spelling/grammar/syntax not found on text review)

## 2020-11-23 ENCOUNTER — TELEPHONE (OUTPATIENT)
Dept: ENDOSCOPY | Facility: HOSPITAL | Age: 50
End: 2020-11-23

## 2020-11-23 NOTE — TELEPHONE ENCOUNTER
Left message instructing patient to call dept @ [phone] between 8am-4pm.    Arrival time to be given @ 0700  EGD & Colon/Suprep  (Message sent via text, no portal)

## 2020-11-24 ENCOUNTER — TELEPHONE (OUTPATIENT)
Dept: ENDOSCOPY | Facility: HOSPITAL | Age: 50
End: 2020-11-24

## 2020-11-24 NOTE — TELEPHONE ENCOUNTER
Left message instructing patient to call dept @ [phone] between 8am-4pm.    Arrival time to be given @ 0700  EGD/Colon/Suprep

## 2020-11-25 ENCOUNTER — TELEPHONE (OUTPATIENT)
Dept: GASTROENTEROLOGY | Facility: CLINIC | Age: 50
End: 2020-11-25

## 2020-11-25 ENCOUNTER — ANESTHESIA EVENT (OUTPATIENT)
Dept: ENDOSCOPY | Facility: HOSPITAL | Age: 50
End: 2020-11-25
Payer: MEDICAID

## 2020-11-25 ENCOUNTER — ANESTHESIA (OUTPATIENT)
Dept: ENDOSCOPY | Facility: HOSPITAL | Age: 50
End: 2020-11-25
Payer: MEDICAID

## 2020-11-25 NOTE — TELEPHONE ENCOUNTER
----- Message from Boni Castro III, RN sent at 11/25/2020  9:31 AM CST -----  Patient no show, please call patient to reschedule.

## 2020-11-25 NOTE — ANESTHESIA PREPROCEDURE EVALUATION
11/25/2020  Lorelei Norris is a 50 y.o., female.    Pre-op Assessment       I have reviewed the Medications.     Review of Systems  Anesthesia Hx:  Denies Family Hx of Anesthesia complications.    Social:  Non-Smoker, No Alcohol Use    Cardiovascular:   Hypertension  Deep Venous Thrombosis (DVT)        Physical Exam  General:  Well nourished    Airway/Jaw/Neck:  Airway Findings: Mouth Opening: Normal Tongue: Normal  General Airway Assessment: Adult  Mallampati: II      Dental:  Dental Findings: In tact   Chest/Lungs:  Chest/Lungs Findings: Clear to auscultation, Normal Respiratory Rate     Heart/Vascular:  Heart Findings: Rate: Normal  Rhythm: Regular Rhythm        Mental Status:  Mental Status Findings:  Cooperative, Alert and Oriented         Anesthesia Plan  Type of Anesthesia, risks & benefits discussed:  Anesthesia Type:  general  Patient's Preference: general  Intra-op Monitoring Plan:   Intra-op Monitoring Plan Comments:   Post Op Pain Control Plan:   Post Op Pain Control Plan Comments:   Induction:   IV  Beta Blocker:  Patient is not currently on a Beta-Blocker (No further documentation required).       Informed Consent: Patient understands risks and agrees with Anesthesia plan.  Questions answered. Anesthesia consent signed with patient.  ASA Score: 2     Day of Surgery Review of History & Physical:            Ready For Surgery From Anesthesia Perspective.

## 2021-06-12 ENCOUNTER — HOSPITAL ENCOUNTER (EMERGENCY)
Facility: HOSPITAL | Age: 51
Discharge: LEFT BEFORE DISCHARGE | End: 2021-06-12
Attending: EMERGENCY MEDICINE
Payer: MEDICAID

## 2021-06-12 VITALS
HEIGHT: 64 IN | RESPIRATION RATE: 18 BRPM | OXYGEN SATURATION: 99 % | BODY MASS INDEX: 31.76 KG/M2 | SYSTOLIC BLOOD PRESSURE: 159 MMHG | WEIGHT: 186 LBS | HEART RATE: 109 BPM | TEMPERATURE: 99 F | DIASTOLIC BLOOD PRESSURE: 88 MMHG
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
## 2021-06-12 LAB
B-HCG UR QL: NEGATIVE
BACTERIA #/AREA URNS HPF: NORMAL /HPF
BILIRUB UR QL STRIP: NEGATIVE
CLARITY UR: CLEAR
COLOR UR: YELLOW
CTP QC/QA: YES
GLUCOSE UR QL STRIP: ABNORMAL
HGB UR QL STRIP: NEGATIVE
KETONES UR QL STRIP: NEGATIVE
LEUKOCYTE ESTERASE UR QL STRIP: NEGATIVE
MICROSCOPIC COMMENT: NORMAL
NITRITE UR QL STRIP: NEGATIVE
PH UR STRIP: 7 [PH] (ref 5–8)
PROT UR QL STRIP: NEGATIVE
SP GR UR STRIP: 1.02 (ref 1–1.03)
SQUAMOUS #/AREA URNS HPF: 0 /HPF
URN SPEC COLLECT METH UR: ABNORMAL
UROBILINOGEN UR STRIP-ACNC: NEGATIVE EU/DL
YEAST URNS QL MICRO: NORMAL

## 2021-06-12 PROCEDURE — 81025 URINE PREGNANCY TEST: CPT | Performed by: EMERGENCY MEDICINE

## 2021-06-12 PROCEDURE — 81000 URINALYSIS NONAUTO W/SCOPE: CPT | Performed by: PHYSICIAN ASSISTANT

## 2021-06-12 PROCEDURE — 99283 EMERGENCY DEPT VISIT LOW MDM: CPT | Mod: 25

## 2021-06-22 ENCOUNTER — HOSPITAL ENCOUNTER (EMERGENCY)
Facility: HOSPITAL | Age: 51
Discharge: HOME | End: 2021-06-23
Attending: EMERGENCY MEDICINE
Payer: MEDICAID

## 2021-06-22 DIAGNOSIS — M94.0 COSTOCHONDRITIS: Primary | ICD-10-CM

## 2021-06-22 DIAGNOSIS — R07.9 CHEST PAIN: ICD-10-CM

## 2021-06-22 DIAGNOSIS — M79.669 CALF TENDERNESS: ICD-10-CM

## 2021-06-22 PROCEDURE — 96374 THER/PROPH/DIAG INJ IV PUSH: CPT

## 2021-06-22 PROCEDURE — 93005 ELECTROCARDIOGRAM TRACING: CPT

## 2021-06-22 PROCEDURE — 99285 EMERGENCY DEPT VISIT HI MDM: CPT | Mod: 25

## 2021-06-22 PROCEDURE — 93010 ELECTROCARDIOGRAM REPORT: CPT | Mod: ,,, | Performed by: INTERNAL MEDICINE

## 2021-06-22 PROCEDURE — 93010 EKG 12-LEAD: ICD-10-PCS | Mod: ,,, | Performed by: INTERNAL MEDICINE

## 2021-06-22 PROCEDURE — 96361 HYDRATE IV INFUSION ADD-ON: CPT

## 2021-06-22 RX ORDER — NAPROXEN SODIUM 220 MG/1
324 TABLET, FILM COATED ORAL
Status: COMPLETED | OUTPATIENT
Start: 2021-06-22 | End: 2021-06-23

## 2021-06-23 VITALS
DIASTOLIC BLOOD PRESSURE: 68 MMHG | SYSTOLIC BLOOD PRESSURE: 106 MMHG | HEART RATE: 102 BPM | OXYGEN SATURATION: 99 % | RESPIRATION RATE: 24 BRPM | WEIGHT: 180 LBS | BODY MASS INDEX: 30.73 KG/M2 | TEMPERATURE: 98 F | HEIGHT: 64 IN

## 2021-06-23 LAB
ALBUMIN SERPL BCP-MCNC: 3.2 G/DL (ref 3.5–5.2)
ALP SERPL-CCNC: 133 U/L (ref 55–135)
ALT SERPL W/O P-5'-P-CCNC: 23 U/L (ref 10–44)
ANION GAP SERPL CALC-SCNC: 13 MMOL/L (ref 8–16)
AST SERPL-CCNC: 15 U/L (ref 10–40)
BASOPHILS # BLD AUTO: 0.03 K/UL (ref 0–0.2)
BASOPHILS NFR BLD: 0.5 % (ref 0–1.9)
BILIRUB SERPL-MCNC: 0.3 MG/DL (ref 0.1–1)
BNP SERPL-MCNC: <10 PG/ML (ref 0–99)
BUN SERPL-MCNC: 15 MG/DL (ref 6–20)
CALCIUM SERPL-MCNC: 9.3 MG/DL (ref 8.7–10.5)
CHLORIDE SERPL-SCNC: 104 MMOL/L (ref 95–110)
CK SERPL-CCNC: 169 U/L (ref 20–180)
CO2 SERPL-SCNC: 21 MMOL/L (ref 23–29)
CREAT SERPL-MCNC: 1.3 MG/DL (ref 0.5–1.4)
D DIMER PPP IA.FEU-MCNC: 5.57 MG/L FEU
DIFFERENTIAL METHOD: ABNORMAL
EOSINOPHIL # BLD AUTO: 0.1 K/UL (ref 0–0.5)
EOSINOPHIL NFR BLD: 2.2 % (ref 0–8)
ERYTHROCYTE [DISTWIDTH] IN BLOOD BY AUTOMATED COUNT: 13.1 % (ref 11.5–14.5)
EST. GFR  (AFRICAN AMERICAN): 55 ML/MIN/1.73 M^2
EST. GFR  (NON AFRICAN AMERICAN): 47 ML/MIN/1.73 M^2
GLUCOSE SERPL-MCNC: 315 MG/DL (ref 70–110)
HCT VFR BLD AUTO: 41.5 % (ref 37–48.5)
HGB BLD-MCNC: 13.5 G/DL (ref 12–16)
IMM GRANULOCYTES # BLD AUTO: 0.02 K/UL (ref 0–0.04)
IMM GRANULOCYTES NFR BLD AUTO: 0.3 % (ref 0–0.5)
INR PPP: 0.9 (ref 0.8–1.2)
LYMPHOCYTES # BLD AUTO: 3.5 K/UL (ref 1–4.8)
LYMPHOCYTES NFR BLD: 54.7 % (ref 18–48)
MAGNESIUM SERPL-MCNC: 1.8 MG/DL (ref 1.6–2.6)
MCH RBC QN AUTO: 28 PG (ref 27–31)
MCHC RBC AUTO-ENTMCNC: 32.5 G/DL (ref 32–36)
MCV RBC AUTO: 86 FL (ref 82–98)
MONOCYTES # BLD AUTO: 0.6 K/UL (ref 0.3–1)
MONOCYTES NFR BLD: 9.1 % (ref 4–15)
NEUTROPHILS # BLD AUTO: 2.2 K/UL (ref 1.8–7.7)
NEUTROPHILS NFR BLD: 33.2 % (ref 38–73)
NRBC BLD-RTO: 0 /100 WBC
PLATELET # BLD AUTO: 266 K/UL (ref 150–450)
PMV BLD AUTO: 10.3 FL (ref 9.2–12.9)
POTASSIUM SERPL-SCNC: 4.1 MMOL/L (ref 3.5–5.1)
PROT SERPL-MCNC: 7.5 G/DL (ref 6–8.4)
PROTHROMBIN TIME: 9.7 SEC (ref 9–12.5)
RBC # BLD AUTO: 4.82 M/UL (ref 4–5.4)
SODIUM SERPL-SCNC: 138 MMOL/L (ref 136–145)
TROPONIN I SERPL DL<=0.01 NG/ML-MCNC: <0.006 NG/ML (ref 0–0.03)
WBC # BLD AUTO: 6.47 K/UL (ref 3.9–12.7)

## 2021-06-23 PROCEDURE — 83880 ASSAY OF NATRIURETIC PEPTIDE: CPT | Performed by: EMERGENCY MEDICINE

## 2021-06-23 PROCEDURE — 85025 COMPLETE CBC W/AUTO DIFF WBC: CPT | Performed by: EMERGENCY MEDICINE

## 2021-06-23 PROCEDURE — 80053 COMPREHEN METABOLIC PANEL: CPT | Performed by: EMERGENCY MEDICINE

## 2021-06-23 PROCEDURE — 82550 ASSAY OF CK (CPK): CPT | Performed by: EMERGENCY MEDICINE

## 2021-06-23 PROCEDURE — 84484 ASSAY OF TROPONIN QUANT: CPT | Performed by: EMERGENCY MEDICINE

## 2021-06-23 PROCEDURE — 85610 PROTHROMBIN TIME: CPT | Performed by: EMERGENCY MEDICINE

## 2021-06-23 PROCEDURE — 63600175 PHARM REV CODE 636 W HCPCS: Performed by: EMERGENCY MEDICINE

## 2021-06-23 PROCEDURE — 83735 ASSAY OF MAGNESIUM: CPT | Performed by: EMERGENCY MEDICINE

## 2021-06-23 PROCEDURE — 85379 FIBRIN DEGRADATION QUANT: CPT | Performed by: EMERGENCY MEDICINE

## 2021-06-23 PROCEDURE — 25000003 PHARM REV CODE 250: Performed by: EMERGENCY MEDICINE

## 2021-06-23 PROCEDURE — 25500020 PHARM REV CODE 255: Performed by: EMERGENCY MEDICINE

## 2021-06-23 RX ORDER — LIDOCAINE 50 MG/G
1 PATCH TOPICAL DAILY
Qty: 30 PATCH | Refills: 0 | Status: SHIPPED | OUTPATIENT
Start: 2021-06-23 | End: 2023-03-18 | Stop reason: SDUPTHER

## 2021-06-23 RX ORDER — METHOCARBAMOL 500 MG/1
1000 TABLET, FILM COATED ORAL 3 TIMES DAILY PRN
Qty: 30 TABLET | Refills: 0 | Status: SHIPPED | OUTPATIENT
Start: 2021-06-23 | End: 2021-06-28

## 2021-06-23 RX ORDER — IBUPROFEN 600 MG/1
600 TABLET ORAL EVERY 6 HOURS PRN
Qty: 20 TABLET | Refills: 0 | OUTPATIENT
Start: 2021-06-23 | End: 2022-08-05

## 2021-06-23 RX ORDER — KETOROLAC TROMETHAMINE 30 MG/ML
15 INJECTION, SOLUTION INTRAMUSCULAR; INTRAVENOUS
Status: COMPLETED | OUTPATIENT
Start: 2021-06-23 | End: 2021-06-23

## 2021-06-23 RX ORDER — METHOCARBAMOL 500 MG/1
1000 TABLET, FILM COATED ORAL
Status: COMPLETED | OUTPATIENT
Start: 2021-06-23 | End: 2021-06-23

## 2021-06-23 RX ORDER — LIDOCAINE 50 MG/G
1 PATCH TOPICAL
Status: DISCONTINUED | OUTPATIENT
Start: 2021-06-23 | End: 2021-06-23 | Stop reason: HOSPADM

## 2021-06-23 RX ADMIN — KETOROLAC TROMETHAMINE 15 MG: 30 INJECTION, SOLUTION INTRAMUSCULAR; INTRAVENOUS at 02:06

## 2021-06-23 RX ADMIN — ASPIRIN 324 MG: 81 TABLET, CHEWABLE ORAL at 12:06

## 2021-06-23 RX ADMIN — SODIUM CHLORIDE 500 ML: 0.9 INJECTION, SOLUTION INTRAVENOUS at 12:06

## 2021-06-23 RX ADMIN — METHOCARBAMOL TABLETS 1000 MG: 500 TABLET, COATED ORAL at 02:06

## 2021-06-23 RX ADMIN — IOHEXOL 100 ML: 350 INJECTION, SOLUTION INTRAVENOUS at 02:06

## 2021-06-23 RX ADMIN — LIDOCAINE 1 PATCH: 50 PATCH TOPICAL at 12:06

## 2021-10-29 ENCOUNTER — HOSPITAL ENCOUNTER (EMERGENCY)
Facility: HOSPITAL | Age: 51
Discharge: HOME | End: 2021-10-29
Attending: EMERGENCY MEDICINE
Payer: MEDICAID

## 2021-10-29 VITALS
BODY MASS INDEX: 30.9 KG/M2 | HEART RATE: 89 BPM | WEIGHT: 180 LBS | RESPIRATION RATE: 20 BRPM | TEMPERATURE: 98 F | OXYGEN SATURATION: 99 % | SYSTOLIC BLOOD PRESSURE: 110 MMHG | DIASTOLIC BLOOD PRESSURE: 61 MMHG

## 2021-10-29 DIAGNOSIS — K85.90 ACUTE PANCREATITIS, UNSPECIFIED COMPLICATION STATUS, UNSPECIFIED PANCREATITIS TYPE: Primary | ICD-10-CM

## 2021-10-29 DIAGNOSIS — R73.9 HYPERGLYCEMIA: ICD-10-CM

## 2021-10-29 LAB
ALBUMIN SERPL BCP-MCNC: 3.4 G/DL (ref 3.5–5.2)
ALP SERPL-CCNC: 140 U/L (ref 55–135)
ALT SERPL W/O P-5'-P-CCNC: 14 U/L (ref 10–44)
ANION GAP SERPL CALC-SCNC: 12 MMOL/L (ref 8–16)
AST SERPL-CCNC: 10 U/L (ref 10–40)
B-OH-BUTYR BLD STRIP-SCNC: 0.1 MMOL/L (ref 0–0.5)
BACTERIA #/AREA URNS HPF: NORMAL /HPF
BACTERIA GENITAL QL WET PREP: ABNORMAL
BASOPHILS # BLD AUTO: 0.03 K/UL (ref 0–0.2)
BASOPHILS NFR BLD: 0.5 % (ref 0–1.9)
BILIRUB SERPL-MCNC: 0.3 MG/DL (ref 0.1–1)
BILIRUB UR QL STRIP: NEGATIVE
BUN SERPL-MCNC: 19 MG/DL (ref 6–20)
CALCIUM SERPL-MCNC: 9.2 MG/DL (ref 8.7–10.5)
CHLORIDE SERPL-SCNC: 104 MMOL/L (ref 95–110)
CLARITY UR: CLEAR
CLUE CELLS VAG QL WET PREP: ABNORMAL
CO2 SERPL-SCNC: 20 MMOL/L (ref 23–29)
COLOR UR: COLORLESS
CREAT SERPL-MCNC: 1.5 MG/DL (ref 0.5–1.4)
DIFFERENTIAL METHOD: ABNORMAL
EOSINOPHIL # BLD AUTO: 0.1 K/UL (ref 0–0.5)
EOSINOPHIL NFR BLD: 1.4 % (ref 0–8)
ERYTHROCYTE [DISTWIDTH] IN BLOOD BY AUTOMATED COUNT: 13.6 % (ref 11.5–14.5)
EST. GFR  (AFRICAN AMERICAN): 46 ML/MIN/1.73 M^2
EST. GFR  (NON AFRICAN AMERICAN): 40 ML/MIN/1.73 M^2
FILAMENT FUNGI VAG WET PREP-#/AREA: ABNORMAL
GLUCOSE SERPL-MCNC: 496 MG/DL (ref 70–110)
GLUCOSE UR QL STRIP: ABNORMAL
HCT VFR BLD AUTO: 41.4 % (ref 37–48.5)
HGB BLD-MCNC: 13.2 G/DL (ref 12–16)
HGB UR QL STRIP: NEGATIVE
IMM GRANULOCYTES # BLD AUTO: 0.01 K/UL (ref 0–0.04)
IMM GRANULOCYTES NFR BLD AUTO: 0.2 % (ref 0–0.5)
KETONES UR QL STRIP: NEGATIVE
LEUKOCYTE ESTERASE UR QL STRIP: NEGATIVE
LIPASE SERPL-CCNC: 116 U/L (ref 4–60)
LYMPHOCYTES # BLD AUTO: 2.3 K/UL (ref 1–4.8)
LYMPHOCYTES NFR BLD: 36.7 % (ref 18–48)
MCH RBC QN AUTO: 28.1 PG (ref 27–31)
MCHC RBC AUTO-ENTMCNC: 31.9 G/DL (ref 32–36)
MCV RBC AUTO: 88 FL (ref 82–98)
MICROSCOPIC COMMENT: NORMAL
MONOCYTES # BLD AUTO: 0.5 K/UL (ref 0.3–1)
MONOCYTES NFR BLD: 7.5 % (ref 4–15)
NEUTROPHILS # BLD AUTO: 3.4 K/UL (ref 1.8–7.7)
NEUTROPHILS NFR BLD: 53.7 % (ref 38–73)
NITRITE UR QL STRIP: NEGATIVE
NRBC BLD-RTO: 0 /100 WBC
PH UR STRIP: 6 [PH] (ref 5–8)
PLATELET # BLD AUTO: 286 K/UL (ref 150–450)
PMV BLD AUTO: 10.4 FL (ref 9.2–12.9)
POCT GLUCOSE: 264 MG/DL (ref 70–110)
POCT GLUCOSE: 359 MG/DL (ref 70–110)
POCT GLUCOSE: 477 MG/DL (ref 70–110)
POTASSIUM SERPL-SCNC: 4 MMOL/L (ref 3.5–5.1)
PROT SERPL-MCNC: 7.6 G/DL (ref 6–8.4)
PROT UR QL STRIP: NEGATIVE
RBC # BLD AUTO: 4.69 M/UL (ref 4–5.4)
SODIUM SERPL-SCNC: 136 MMOL/L (ref 136–145)
SP GR UR STRIP: 1.03 (ref 1–1.03)
SPECIMEN SOURCE: ABNORMAL
SQUAMOUS #/AREA URNS HPF: 1 /HPF
T VAGINALIS GENITAL QL WET PREP: ABNORMAL
URN SPEC COLLECT METH UR: ABNORMAL
UROBILINOGEN UR STRIP-ACNC: NEGATIVE EU/DL
WBC # BLD AUTO: 6.24 K/UL (ref 3.9–12.7)
WBC #/AREA URNS HPF: 1 /HPF (ref 0–5)
WBC #/AREA VAG WET PREP: ABNORMAL
YEAST GENITAL QL WET PREP: ABNORMAL
YEAST URNS QL MICRO: NORMAL

## 2021-10-29 PROCEDURE — 96361 HYDRATE IV INFUSION ADD-ON: CPT

## 2021-10-29 PROCEDURE — 87491 CHLMYD TRACH DNA AMP PROBE: CPT | Performed by: NURSE PRACTITIONER

## 2021-10-29 PROCEDURE — 85025 COMPLETE CBC W/AUTO DIFF WBC: CPT | Performed by: NURSE PRACTITIONER

## 2021-10-29 PROCEDURE — 87210 SMEAR WET MOUNT SALINE/INK: CPT | Performed by: NURSE PRACTITIONER

## 2021-10-29 PROCEDURE — 87591 N.GONORRHOEAE DNA AMP PROB: CPT | Performed by: NURSE PRACTITIONER

## 2021-10-29 PROCEDURE — 96374 THER/PROPH/DIAG INJ IV PUSH: CPT

## 2021-10-29 PROCEDURE — 82010 KETONE BODYS QUAN: CPT | Performed by: PHYSICIAN ASSISTANT

## 2021-10-29 PROCEDURE — 80053 COMPREHEN METABOLIC PANEL: CPT | Performed by: NURSE PRACTITIONER

## 2021-10-29 PROCEDURE — 83690 ASSAY OF LIPASE: CPT | Performed by: NURSE PRACTITIONER

## 2021-10-29 PROCEDURE — 82962 GLUCOSE BLOOD TEST: CPT | Mod: 59

## 2021-10-29 PROCEDURE — 99284 EMERGENCY DEPT VISIT MOD MDM: CPT | Mod: 25

## 2021-10-29 PROCEDURE — 25000003 PHARM REV CODE 250: Performed by: PHYSICIAN ASSISTANT

## 2021-10-29 PROCEDURE — 81000 URINALYSIS NONAUTO W/SCOPE: CPT | Performed by: NURSE PRACTITIONER

## 2021-10-29 PROCEDURE — 63600175 PHARM REV CODE 636 W HCPCS: Performed by: PHYSICIAN ASSISTANT

## 2021-10-29 RX ORDER — INSULIN GLARGINE 100 [IU]/ML
INJECTION, SOLUTION SUBCUTANEOUS
COMMUNITY
Start: 2021-09-24 | End: 2023-03-18

## 2021-10-29 RX ORDER — ONDANSETRON 4 MG/1
4 TABLET, ORALLY DISINTEGRATING ORAL EVERY 6 HOURS PRN
Qty: 12 TABLET | Refills: 0 | Status: SHIPPED | OUTPATIENT
Start: 2021-10-29

## 2021-10-29 RX ORDER — GABAPENTIN 600 MG/1
600 TABLET ORAL 3 TIMES DAILY
COMMUNITY
Start: 2021-09-27 | End: 2022-09-08 | Stop reason: SDUPTHER

## 2021-10-29 RX ORDER — HYDROCODONE BITARTRATE AND ACETAMINOPHEN 5; 325 MG/1; MG/1
1 TABLET ORAL
Status: COMPLETED | OUTPATIENT
Start: 2021-10-29 | End: 2021-10-29

## 2021-10-29 RX ORDER — HYDROCODONE BITARTRATE AND ACETAMINOPHEN 5; 325 MG/1; MG/1
1 TABLET ORAL EVERY 4 HOURS PRN
Qty: 10 TABLET | Refills: 0 | Status: SHIPPED | OUTPATIENT
Start: 2021-10-29 | End: 2021-12-15 | Stop reason: SDUPTHER

## 2021-10-29 RX ORDER — ATORVASTATIN CALCIUM 80 MG/1
80 TABLET, FILM COATED ORAL DAILY
COMMUNITY
Start: 2021-08-31 | End: 2023-03-18

## 2021-10-29 RX ORDER — GLIPIZIDE 10 MG/1
10 TABLET ORAL DAILY
COMMUNITY
Start: 2021-10-05 | End: 2023-03-18

## 2021-10-29 RX ADMIN — SODIUM CHLORIDE 1000 ML: 0.9 INJECTION, SOLUTION INTRAVENOUS at 01:10

## 2021-10-29 RX ADMIN — INSULIN HUMAN 8 UNITS: 100 INJECTION, SOLUTION PARENTERAL at 02:10

## 2021-10-29 RX ADMIN — SODIUM CHLORIDE 1000 ML: 0.9 INJECTION, SOLUTION INTRAVENOUS at 02:10

## 2021-10-29 RX ADMIN — HYDROCODONE BITARTRATE AND ACETAMINOPHEN 1 TABLET: 5; 325 TABLET ORAL at 02:10

## 2021-11-01 LAB
C TRACH DNA SPEC QL NAA+PROBE: NOT DETECTED
N GONORRHOEA DNA SPEC QL NAA+PROBE: NOT DETECTED

## 2021-12-15 ENCOUNTER — HOSPITAL ENCOUNTER (EMERGENCY)
Facility: HOSPITAL | Age: 51
Discharge: HOME | End: 2021-12-15
Attending: EMERGENCY MEDICINE
Payer: MEDICAID

## 2021-12-15 VITALS
RESPIRATION RATE: 18 BRPM | BODY MASS INDEX: 33.8 KG/M2 | TEMPERATURE: 99 F | HEART RATE: 85 BPM | DIASTOLIC BLOOD PRESSURE: 74 MMHG | OXYGEN SATURATION: 96 % | HEIGHT: 64 IN | SYSTOLIC BLOOD PRESSURE: 132 MMHG | WEIGHT: 198 LBS

## 2021-12-15 DIAGNOSIS — N83.201 RIGHT OVARIAN CYST: Primary | ICD-10-CM

## 2021-12-15 DIAGNOSIS — R10.13 EPIGASTRIC PAIN: ICD-10-CM

## 2021-12-15 DIAGNOSIS — R10.31 RIGHT LOWER QUADRANT ABDOMINAL PAIN: ICD-10-CM

## 2021-12-15 DIAGNOSIS — R10.9 RIGHT FLANK PAIN: ICD-10-CM

## 2021-12-15 LAB
ALBUMIN SERPL BCP-MCNC: 3.3 G/DL (ref 3.5–5.2)
ALP SERPL-CCNC: 111 U/L (ref 55–135)
ALT SERPL W/O P-5'-P-CCNC: 18 U/L (ref 10–44)
ANION GAP SERPL CALC-SCNC: 5 MMOL/L (ref 8–16)
AST SERPL-CCNC: 17 U/L (ref 10–40)
BASOPHILS # BLD AUTO: 0.04 K/UL (ref 0–0.2)
BASOPHILS NFR BLD: 0.7 % (ref 0–1.9)
BILIRUB SERPL-MCNC: 0.2 MG/DL (ref 0.1–1)
BILIRUB UR QL STRIP: NEGATIVE
BUN SERPL-MCNC: 13 MG/DL (ref 6–20)
CALCIUM SERPL-MCNC: 9.4 MG/DL (ref 8.7–10.5)
CHLORIDE SERPL-SCNC: 105 MMOL/L (ref 95–110)
CLARITY UR: ABNORMAL
CO2 SERPL-SCNC: 30 MMOL/L (ref 23–29)
COLOR UR: YELLOW
CREAT SERPL-MCNC: 1.1 MG/DL (ref 0.5–1.4)
DIFFERENTIAL METHOD: NORMAL
EOSINOPHIL # BLD AUTO: 0.1 K/UL (ref 0–0.5)
EOSINOPHIL NFR BLD: 2.5 % (ref 0–8)
ERYTHROCYTE [DISTWIDTH] IN BLOOD BY AUTOMATED COUNT: 13.4 % (ref 11.5–14.5)
EST. GFR  (AFRICAN AMERICAN): >60 ML/MIN/1.73 M^2
EST. GFR  (NON AFRICAN AMERICAN): 58 ML/MIN/1.73 M^2
GLUCOSE SERPL-MCNC: 193 MG/DL (ref 70–110)
GLUCOSE UR QL STRIP: ABNORMAL
HCT VFR BLD AUTO: 40.2 % (ref 37–48.5)
HGB BLD-MCNC: 12.9 G/DL (ref 12–16)
HGB UR QL STRIP: NEGATIVE
IMM GRANULOCYTES # BLD AUTO: 0.01 K/UL (ref 0–0.04)
IMM GRANULOCYTES NFR BLD AUTO: 0.2 % (ref 0–0.5)
KETONES UR QL STRIP: NEGATIVE
LEUKOCYTE ESTERASE UR QL STRIP: NEGATIVE
LIPASE SERPL-CCNC: 66 U/L (ref 4–60)
LYMPHOCYTES # BLD AUTO: 2.2 K/UL (ref 1–4.8)
LYMPHOCYTES NFR BLD: 38.7 % (ref 18–48)
MCH RBC QN AUTO: 27.6 PG (ref 27–31)
MCHC RBC AUTO-ENTMCNC: 32.1 G/DL (ref 32–36)
MCV RBC AUTO: 86 FL (ref 82–98)
MONOCYTES # BLD AUTO: 0.4 K/UL (ref 0.3–1)
MONOCYTES NFR BLD: 7.8 % (ref 4–15)
NEUTROPHILS # BLD AUTO: 2.8 K/UL (ref 1.8–7.7)
NEUTROPHILS NFR BLD: 50.1 % (ref 38–73)
NITRITE UR QL STRIP: NEGATIVE
NRBC BLD-RTO: 0 /100 WBC
PH UR STRIP: 6 [PH] (ref 5–8)
PLATELET # BLD AUTO: 245 K/UL (ref 150–450)
PMV BLD AUTO: 11.2 FL (ref 9.2–12.9)
POTASSIUM SERPL-SCNC: 4.3 MMOL/L (ref 3.5–5.1)
PROT SERPL-MCNC: 7.1 G/DL (ref 6–8.4)
PROT UR QL STRIP: NEGATIVE
RBC # BLD AUTO: 4.67 M/UL (ref 4–5.4)
SODIUM SERPL-SCNC: 140 MMOL/L (ref 136–145)
SP GR UR STRIP: 1.02 (ref 1–1.03)
URN SPEC COLLECT METH UR: ABNORMAL
UROBILINOGEN UR STRIP-ACNC: NEGATIVE EU/DL
WBC # BLD AUTO: 5.61 K/UL (ref 3.9–12.7)

## 2021-12-15 PROCEDURE — 96375 TX/PRO/DX INJ NEW DRUG ADDON: CPT

## 2021-12-15 PROCEDURE — 81003 URINALYSIS AUTO W/O SCOPE: CPT | Performed by: NURSE PRACTITIONER

## 2021-12-15 PROCEDURE — 99285 EMERGENCY DEPT VISIT HI MDM: CPT | Mod: 25

## 2021-12-15 PROCEDURE — 85025 COMPLETE CBC W/AUTO DIFF WBC: CPT | Performed by: NURSE PRACTITIONER

## 2021-12-15 PROCEDURE — 83690 ASSAY OF LIPASE: CPT | Performed by: PHYSICIAN ASSISTANT

## 2021-12-15 PROCEDURE — 63600175 PHARM REV CODE 636 W HCPCS: Performed by: PHYSICIAN ASSISTANT

## 2021-12-15 PROCEDURE — 96374 THER/PROPH/DIAG INJ IV PUSH: CPT

## 2021-12-15 PROCEDURE — 80053 COMPREHEN METABOLIC PANEL: CPT | Performed by: PHYSICIAN ASSISTANT

## 2021-12-15 PROCEDURE — 63600175 PHARM REV CODE 636 W HCPCS: Performed by: EMERGENCY MEDICINE

## 2021-12-15 RX ORDER — MORPHINE SULFATE 4 MG/ML
4 INJECTION, SOLUTION INTRAMUSCULAR; INTRAVENOUS
Status: COMPLETED | OUTPATIENT
Start: 2021-12-15 | End: 2021-12-15

## 2021-12-15 RX ORDER — ONDANSETRON 2 MG/ML
4 INJECTION INTRAMUSCULAR; INTRAVENOUS
Status: COMPLETED | OUTPATIENT
Start: 2021-12-15 | End: 2021-12-15

## 2021-12-15 RX ORDER — KETOROLAC TROMETHAMINE 30 MG/ML
15 INJECTION, SOLUTION INTRAMUSCULAR; INTRAVENOUS
Status: COMPLETED | OUTPATIENT
Start: 2021-12-15 | End: 2021-12-15

## 2021-12-15 RX ORDER — HYDROCODONE BITARTRATE AND ACETAMINOPHEN 5; 325 MG/1; MG/1
1 TABLET ORAL EVERY 4 HOURS PRN
Qty: 18 TABLET | Refills: 0 | OUTPATIENT
Start: 2021-12-15 | End: 2022-08-05

## 2021-12-15 RX ADMIN — KETOROLAC TROMETHAMINE 15 MG: 30 INJECTION, SOLUTION INTRAMUSCULAR at 04:12

## 2021-12-15 RX ADMIN — MORPHINE SULFATE 4 MG: 4 INJECTION INTRAVENOUS at 05:12

## 2021-12-15 RX ADMIN — ONDANSETRON 4 MG: 2 INJECTION INTRAMUSCULAR; INTRAVENOUS at 05:12

## 2022-02-05 ENCOUNTER — HISTORICAL (OUTPATIENT)
Dept: ADMINISTRATIVE | Facility: HOSPITAL | Age: 52
End: 2022-02-05

## 2022-02-09 ENCOUNTER — PATIENT OUTREACH (OUTPATIENT)
Dept: EMERGENCY MEDICINE | Facility: HOSPITAL | Age: 52
End: 2022-02-09

## 2022-03-17 ENCOUNTER — HISTORICAL (OUTPATIENT)
Dept: ADMINISTRATIVE | Facility: HOSPITAL | Age: 52
End: 2022-03-17

## 2022-03-21 ENCOUNTER — PATIENT OUTREACH (OUTPATIENT)
Dept: EMERGENCY MEDICINE | Facility: HOSPITAL | Age: 52
End: 2022-03-21

## 2022-04-10 ENCOUNTER — HISTORICAL (OUTPATIENT)
Dept: ADMINISTRATIVE | Facility: HOSPITAL | Age: 52
End: 2022-04-10
Payer: MEDICAID

## 2022-04-25 VITALS
DIASTOLIC BLOOD PRESSURE: 73 MMHG | WEIGHT: 184.94 LBS | HEIGHT: 64 IN | BODY MASS INDEX: 31.57 KG/M2 | SYSTOLIC BLOOD PRESSURE: 106 MMHG

## 2022-05-03 NOTE — HISTORICAL [facility] C[facility]ER
This is a historical note converted from Srinivas. Formatting and pictures may have been removed.  Please reference Srinivas for original formatting and attached multimedia. Admit Date: 03/04/2019  Transfer?Date: 03/05/2019  ?  ATTENDING PHYSICIAN:?Isa Madden MD  ?  PRIMARY CARE PHYSICIAN:?Isa Madden MD  REFERRING PHYSICIAN: Adam Corona MD  CONSULTING PHYSICIAN(S):?None  CONDITION ON DISCHARGE:?Stable  ?  FINAL DIAGNOSIS:?Left hemiparesis 2/2 neuroforaminal stenosis c5-c6; Uncontrolled hypertension  ?Procedures  ????No Procedures Documented  ?  HISTORY OF PRESENT ILLNESS:?For more information, please refer to the full Admission H&P Note  ?  PHYSICAL EXAM: Please see Progress note from today  ?  Vitals & Measurements  T:?36.7? ?C (Oral)? TMIN:?36.6? ?C (Oral)? TMAX:?37.1? ?C (Oral)? HR:?79(Peripheral)? RR:?18? BP:?129/83? SpO2:?99%? WT:?82.5?kg?  ?  ?  ?  Labs Last 24 Hours?  ?  ?Chemistry? Hematology/Coagulation?   Sodium Lvl: 145 mmol/L (03/05/19 04:54:00) PT: 12.2 second(s) (03/04/19 12:25:00)   Potassium Lvl: 3.8 mmol/L (03/05/19 04:54:00) INR: 0.91 (03/04/19 12:25:00)   Chloride:?113 mmol/L?High (03/05/19 04:54:00) PTT: 24.6 second(s) (03/04/19 12:25:00)   CO2: 27 mmol/L (03/05/19 04:54:00) WBC: 5 x10(3)/mcL (03/05/19 04:54:00)   Calcium Lvl:?8.4 mg/dL?Low (03/05/19 04:54:00) RBC:?3.94 x10(6)/mcL?Low (03/05/19 04:54:00)   Glucose Lvl: 97 mg/dL (03/05/19 04:54:00) Hgb:?11.3 gm/dL?Low (03/05/19 04:54:00)   BUN: 14 mg/dL (03/05/19 04:54:00) Hct: 35.4 % (03/05/19 04:54:00)   Creatinine: 1.1 mg/dL (03/05/19 04:54:00) Platelet: 245 x10(3)/mcL (03/05/19 04:54:00)   BUN/Creat Ratio: 13 (03/05/19 04:54:00) MCV: 89.8 fL (03/05/19 04:54:00)   eGFR-AA:?68 mL/min?Low (03/05/19 04:54:00) MCH: 28.7 pg (03/05/19 04:54:00)   eGFR-SRIRAM:?56 mL/min?Low (03/05/19 04:54:00) MCHC: 31.9 gm/dL (03/05/19 04:54:00)   Bili Total: 0.3 mg/dL (03/04/19 12:25:00) RDW: 13.3 % (03/05/19 04:54:00)   Bili Direct: <0.1 (03/04/19 12:25:00)  MPV: 10.2 fL (03/05/19 04:54:00)   Bili Indirect: Calc. not valid (03/04/19 12:25:00) Abs Neut:?1.17 x10(3)/mcL?Low (03/05/19 04:54:00)   AST:?12 unit/L?Low (03/04/19 12:25:00) Segs Man:?33 %?Low (03/04/19 12:25:00)   ALT: 17 unit/L (03/04/19 12:25:00) Band Man: 1 % (03/04/19 12:25:00)   Alk Phos: 87 unit/L (03/04/19 12:25:00) Lymph Man: 44 % (03/04/19 12:25:00)   Total Protein: 7.6 gm/dL (03/04/19 12:25:00) Monocyte Man:?13 %?High (03/04/19 12:25:00)   Albumin Lvl:?3.2 gm/dL?Low (03/04/19 12:25:00) Eos Man: 2 % (03/04/19 12:25:00)   Globulin:?4.4 gm/mL?High (03/04/19 12:25:00) Basophil Man: 0 % (03/04/19 12:25:00)   A/G Ratio:?0.7 ratio?Low (03/04/19 12:25:00) React Lymph Man: 7 % (03/04/19 12:25:00)   Troponin-I: <0.015 (03/04/19 17:42:00) Platelet Est: Normal (03/04/19 12:25:00)    Anisocyte: 1+ (03/04/19 12:25:00)    Microcyte: 2+ (03/04/19 12:25:00)    RBC Morph: Abnormal (03/04/19 12:25:00)   ?  ?  Hospital course: 48-year-old female with PMH HTN, pulmonary embolism currently on xarelto secondary to DVT which was also secondary to broken left foot who was admitted originally for rule out acute CVA as patient had left-sided hemiparesis for 1 week.? MRI and CT head both negative for acute abnormalities.? MRI C-spine was ordered which showed severe canal stenosis at C5-C6 along with neuroforaminal stenosis at C5-C6.? On exam patient has strength 1/5 on entire left side of body along with decreased sensation.? Neurosurgery at [facility] was called which ultimately accepted patient for further care.  ?  Discharge Medications  Prescribed  aspirin (aspirin 81 mg oral tablet, CHEWABLE)?81 mg, Oral, Daily  atorvastatin (atorvastatin 20 mg oral tablet)?80 mg, Oral, Daily  Continue  lisinopril (LISINOPRIL ? TAB 10MG)?10 mg, Oral, Daily  rivaroxaban (Xarelto 20mg Tablet)?20 mg, Oral, With Supper  ?  ?  DISCHARGE INSTRUCTIONS:?Transfer to [facility]  DIET:?NPO  ?  ?  ?   I was present with the Resident during  discharge day management.  ???  [ x] I discussed the case with the Resident and agree with the discharge plan as above.  [ ] I discussed the case with the Resident and agree with the discharge plan as above except:  ???  Time spent on discharge [40 ] minutes  Severe C5-C6 canal stenosis/foraminal stenosis with 1/5 left hemiparesis  Transferring for neurosurgery evaluation  Patient with history of HTN, DVT, PE

## 2022-05-03 NOTE — HISTORICAL [facility] CERNER
This is a historical note converted from Srinivas. Formatting and pictures may have been removed.  Please reference Srinivas for original formatting and attached multimedia. Chief Complaint:  ?   History of Present Illness  A 49 y/o Female?with PMH?HTN,?h/o pulmonary embolism currently on xarelto presents to ED with c/oLeft Sided weakness and numbness for 1 week duration.? She states over the past 3 months she has been having intermittent episodes of left-sided weakness and numbness that are self-limited and resolve after a few minutes.? The  who was at bedside also states that she becomes confused at times and wonders around aimlessly, these episodes occur 1 or 2 times per month for the past 3 months as well.? During any of these events there are no episodes of seizure-like activity.? Currently she states she can barely move her left arm and left leg although there is no slurred speech or facial weakness.? Weakness to this extent has never happened in the past.? She came to the ED today because she was seen by physical therapy who noted the hemiparesis on the left side and sent her for evaluation.? She is undergoing physiotherapy for treatment of left foot break.? CT done in ED showed no acute abnormality with no evidence of ischemic or hemorrhagic stroke.? Patient currently is undergoing MRI. Of npte she complains of left-sided?chest pain described as elephant on my chest.? The pain is intermittent?and is relieved with sitting down, occurs when walking.? She states?there is no radiation or nausea/vomiting?associated with the pain.? Currently states pain is mild?at 2/10.  ?   PMH:HTN, PE 3 months ago curently on xarelto  ?   PSX:  LAP PHU  ?  ?  FMH:?MI (father), DM (mother)  ?  ?  Allergies: Corticosteroids  ?  Social  Tobacco: Current smoker, states smokes 1-2 cig/day  ETOH abuse: Denies  Illicit drug use: Denies  ?   ROS  General:?no fever, no night sweats, chills, fatigue, changes in weight.  Skin: no  rashes, bruises, petechia  HEENT: no headache, head injury, no acute vision changes.  CVS: no chest pain, palpitations, orthopnea, PND, edema  Resp: no SOB, cough, wheezing  GI: no dysphagia, melena, hematochezia, abdominal pain, nausea, vomiting, constipation.  : no dysuria, hematuria, polyuria, CVA pain, nocturia  Musculoskeletal: no joint stiffness, pain, swelling or weakness  Neuro: no headaches, syncope, seizures, numbness, tingling, vertigo, dizziness  ?   Medicine list  Home Medications (2) Active  LISINOPRIL ? TAB 10MG?10 mg = 1 tab(s), Oral, Daily  Xarelto 20mg Tablet?20 mg, Oral, With Supper  ?  ?  Physical exam  Vital Signs (last 24 hrs)_____??????????Last Charted___________  Temp Oral?????????????????36.6 DegC ?(MAR 04 12:27)  Resp Rate??????????????????18 br/min ?(MAR 04 12:27)  SBP??????????????????????H?160mmHg ?(MAR 04 12:27)  DBP??????????????????????86 mmHg ?(MAR 04 12:27)  SpO2??????????????????????100 % ?(MAR 04 12:27)  ?  ?  General: AAOx3, NAD, alert and cooperative  HEENT: PERRLA, EOMI  Neck: no LAD, no JVD, supple, no masses or thyromegaly  CVS: S1/S2 nml, RRR, no murmurs, rubs or gallops, no carotid bruits  Resp: CTA b/l, no wheezing  GI: Not distended, not tender, BS+, no guarding  Skin: not jaundiced, warm, no rashes  Musculoskeletal: On strength testing, strength 5/5 on RUE and RLE. When asked to apply force?to left upper extremety, patient was able to lift arm upright with strength 2/5. With LLE, when asked to apply force, strength was 0/5 and was flacid. However, when asked to sit upright from a sitting posiiton?and swing legs over side of bed, she was able to do so without assistance and use her L arm for support.  Extremities: No edema, peripheral pulses intact  Neuro: CN II,III, IV, VI-XII intact. Decreased sensatation over L face. Sensation intact over L and R side excluding face. DTR 2+. Would not participate in finger to nose testing or rapid alternating movements on L hand.  Intact on R hand. Sluggish?Heel to lorenz test on L.?  ?   Labs  Labs Last 24 Hours?  ?Chemistry? Hematology/Coagulation?   Sodium Lvl: 143 mmol/L (03/04/19 12:25:00) PT: 12.2 second(s) (03/04/19 12:25:00)   Potassium Lvl: 3.8 mmol/L (03/04/19 12:25:00) INR: 0.91 (03/04/19 12:25:00)   Chloride:?112 mmol/L?High (03/04/19 12:25:00) PTT: 24.6 second(s) (03/04/19 12:25:00)   CO2: 27 mmol/L (03/04/19 12:25:00) WBC: 6.5 x10(3)/mcL (03/04/19 12:25:00)   Calcium Lvl: 8.9 mg/dL (03/04/19 12:25:00) RBC: 4.52 x10(6)/mcL (03/04/19 12:25:00)   Glucose Lvl: 100 mg/dL (03/04/19 12:25:00) Hgb: 13 gm/dL (03/04/19 12:25:00)   BUN: 16 mg/dL (03/04/19 12:25:00) Hct: 40.5 % (03/04/19 12:25:00)   Creatinine: 1.2 mg/dL (03/04/19 12:25:00) Platelet: 274 x10(3)/mcL (03/04/19 12:25:00)   eGFR-AA:?61 mL/min?Low (03/04/19 12:25:00) MCV: 89.6 fL (03/04/19 12:25:00)   eGFR-SRIRAM:?51 mL/min?Low (03/04/19 12:25:00) MCH: 28.8 pg (03/04/19 12:25:00)   Bili Total: 0.3 mg/dL (03/04/19 12:25:00) MCHC: 32.1 gm/dL (03/04/19 12:25:00)   Bili Direct: <0.1 (03/04/19 12:25:00) RDW: 13.1 % (03/04/19 12:25:00)   Bili Indirect: Calc. not valid (03/04/19 12:25:00) MPV: 9.9 fL (03/04/19 12:25:00)   AST:?12 unit/L?Low (03/04/19 12:25:00) Abs Neut: 2.39 x10(3)/mcL (03/04/19 12:25:00)   ALT: 17 unit/L (03/04/19 12:25:00) Segs Man:?33 %?Low (03/04/19 12:25:00)   Alk Phos: 87 unit/L (03/04/19 12:25:00) Band Man: 1 % (03/04/19 12:25:00)   Total Protein: 7.6 gm/dL (03/04/19 12:25:00) Lymph Man: 44 % (03/04/19 12:25:00)   Albumin Lvl:?3.2 gm/dL?Low (03/04/19 12:25:00) Monocyte Man:?13 %?High (03/04/19 12:25:00)   Globulin:?4.4 gm/mL?High (03/04/19 12:25:00) Eos Man: 2 % (03/04/19 12:25:00)   A/G Ratio:?0.7 ratio?Low (03/04/19 12:25:00) Basophil Man: 0 % (03/04/19 12:25:00)   Troponin-I: <0.015 (03/04/19 12:25:00) React Lymph Man: 7 % (03/04/19 12:25:00)    Platelet Est: Normal (03/04/19 12:25:00)    Anisocyte: 1+ (03/04/19 12:25:00)    Microcyte: 2+ (03/04/19 12:25:00)     RBC Morph: Abnormal (03/04/19 12:25:00)   ?  ?  ?  Radiology  Accession:?[id number]  Order:?CT Head W/O Contrast  Report Dt/Tm:?03/04/2019 13:11  Report:?  Clinical History:  Acute stroke symptoms  ?  Technique:  Axial CT of the brain were obtained without contrast. There are  osseous reconstructed images available for review with coronal and  sagittal reconstructions.  ?  Automatic exposure control was utilized to reduce the patients  radiation dose.  ?  Comparison:  No prior imaging available for comparison.  ?  Findings:  No acute intracranial hemorrhage, edema or mass. No acute parenchymal  abnormality.  There is no hydrocephalus, evidence of herniation or midline shift.  The ventricles and sulci are normal.?  There is normal gray white differentiation.?  The osseous structures are normal.?  The mastoid air cells are clear.?  The auditory canals are patent bilaterally.  The globes and orbital contents are normal bilaterally.  The visualized maxillary, ethmoid and sphenoid sinuses are clear.  ?  Impression  No acute intracranial abnormality identified.  ?  ?  Accession:?[id number]  Order:?XR Chest 1 View  Report Dt/Tm:?03/04/2019 12:53  Report:?  Clinical History  Acute stroke symptoms  ?  Technique  Portable Single view AP radiograph of the chest.  ?  Comparison  No prior.  ?  Findings  No focal opacification, pleural effusion, or pneumothorax. The  cardiomediastinal silhouette is within normal limits. No acute osseous  abnormality.  ?  IMPRESSION:  No acute cardiopulmonary process.?  ?  ?  ?  ?  Assessment and Plan  ?  1) Possible ischemic CVA  - c/o L sided hemiparesis x1 week, with symptoms being constant.?  - However, she states she has similar episodes that are intermittent and self limitied.  - Strength markedly decreased on L side, however patient able to purposefully transition self from supine position without assistance.  - CT head w/o contrast shows no evidence of hemorrhage or ischemic events.  -  Currently in MRI, will followup results. If no evidence of acute infarct, symptoms not likely related to CVA. Thus will defer stroke workup until after MRI read.  - Symptoms unlikely related to metabolic problem or seizure  ?  2) HTN  - Will continue Lisinopril  - Hydralazine PRN  ?  3) h/o PE  - Continue Xarelto  ?  4) ACS r/o  - C/o elephant on chest chest pain/pressure which is allevaited with rest  - Troponin x1 negative, will trend x1  ?  ?  Prophylaxis: Xarleto  Nutrition: NPO  Antimicrobials:?None  Drips: NS IVF  ?  Disposition: Admit to remote tele for observation of potential ischemic CVA. If MRI shows no ischemic stroke, etiology of symptoms unclear. Otherwise will order acute stroke workup.  ?  ?   Patient is NPO and has oral numbness and thus would unable to pass nursing bedside?swallow study. Medications switched to IV. Started on full dose lovenox.   ?I was present with the resident during the history and physical examination.  ???  [x ] I discussed the case with the resident and agree with the findings and plan as documented in the residents note.  [ ] I discussed the case with the resident and agree with the findings and plan as documented in the residents note except:  Left hemiparesis for about a month , symptoms seem to be waxing and waning as on my exam this am patient has 0/5 strength in left upper extremity and 1-2/5 left lower extremity with DTRs 2-3+ in upper and lower extremities. Decreased sensation left side of body.  MRI of brain- no acute findings  Cervical spine imaging showing C5-C6 significant stenosis/osteophyte  Will need neurosurgery consultation  Will scan the left lower extremity to evaluate hx of DVT and anticoagulation   Patient states she has had a DVT and PE, unsure of timing  Need records for [place] to document when these occurred as DVT was provoked but unsure when PE was diagnosed.  Will continue anticoagulation until old records reviewed

## 2022-05-24 ENCOUNTER — PATIENT OUTREACH (OUTPATIENT)
Dept: EMERGENCY MEDICINE | Facility: HOSPITAL | Age: 52
End: 2022-05-24
Payer: MEDICAID

## 2022-07-19 NOTE — PROGRESS NOTES
Discharge Past 30 Days   Visit to the Hospital in the Last 30 Days :   Emergency department (ED) visit   Reason for Choosing ED for Care :   Convenience   Perception of Change in Health Status DC :   Improving   Discharged To :   Home independently   DC Instructions :   Received discharge instructions , Understands discharge instructions    Education Provided :   Chronic disease process, ED utilization, Importance of keeping appointments, Medication Compliance, Diet Compliance   Savana Merrill LPN - 3/21/2022 11:12 CDT   Discharge Past 30 Days Addntl Comments :   Spoke to patient for f/u call. Went to ED for flare of sciatica. Pt has an appt tomorrow, 3/22/22 with surgery clinic for hernia eval, states she will attend. Was also referred to [facility] ortho for wrist. Pt pcp Jadiel Luciano MD. Will mail dental resources. Discussed using walk in clinic for minor needs, if pcp unavailable.  3/24/22 Dental resources mailed to pt address on file.       Patient pregnant :   N/A   Savana Merrill LPN - 3/21/2022 11:12 CDT   Barriers to Care   SDoH Eat Less Than You Should :   No   SDoH Shut Off Services to Your Home :   No   SDoH No Regular Place to Live :   No   SDoH Needed Provider but Costs too Much :   No   SDoH Help Reading and Writing Paper Work :   No   SDoH Feel Lonely Often :   No   SDoH Missed Appt/Meds- No Transportation :   No   SDoH Call Dr To Be Seen Right Away :   No   SDoH Medical Problems Cause ED Visit :   No   SDoH Other Problems Affecting Health :   No   SDoH Reviewed :   Yes   SDoH Dentist Seen in Last Year :   No   Savana Merrill LPN - 3/21/2022 11:12 CDT   Appointments   Follow-Up Appt Scheduled :   No   Follow-Up Appointment Status :   Has not had opportunity to call for appointment   PCP Visit Upcoming :   No   PCP Visit Within Year :   Yes   Follow-Up Specialist Appt Scheduled :   Yes   Type of Specialist :   Surgeon   Follow-Up Date :   3/22/2022 CDT   Desirae DEL VALLE  Savana Patrick - 3/21/2022 11:12 CDT   Navigation   Initial Assesment Completed By :   Phone   ED FIN :   [id number]   MCIP Navigation Call Log :   First follow up call   Referral To HE Care :   NA   Transportation Arrangements Made :   Yes   Providers Patient Visited Last Year :   Jadiel Luciano MD   Root Causes for High-ED Utilization :   Chronic conditions   Plan: :   Educated on appropriate ED utilization, Educated on alternate means of health care; ie urgent care when PCP not available, Educated on importance of follow up care with PCP, Educated on importance of follow up preventative dental care with dentist, Budget-friendly health eating education given   Participation in Activity Designed to Address Lack of Annual Ambulatory or Preventative Care Visit? :   Yes   Participation in Activity Designed to Address Avoidable ED Utilization? :   Yes   During Current Measurement Year, Did Enrollee Receive Education Regarding Outpatient Primary Care Options? :   Yes   During Current Measurement Year, Did Enrollee Receive an Appt Reminder 24-48 Hours Before a Scheduled Appt? :   Yes   During Current Measurement Year, Did the Network Provider Schedule and Appt or Provide a Referral to Enrollee? :   Yes   Education Provided :   Verbal

## 2022-07-20 ENCOUNTER — HOSPITAL ENCOUNTER (EMERGENCY)
Facility: HOSPITAL | Age: 52
Discharge: HOME | End: 2022-07-21
Attending: EMERGENCY MEDICINE
Payer: MEDICAID

## 2022-07-20 DIAGNOSIS — M54.32 SCIATICA OF LEFT SIDE: ICD-10-CM

## 2022-07-20 DIAGNOSIS — S39.012A LUMBAR STRAIN, INITIAL ENCOUNTER: Primary | ICD-10-CM

## 2022-07-20 DIAGNOSIS — M79.89 LEFT LEG SWELLING: ICD-10-CM

## 2022-07-20 PROCEDURE — 63600175 PHARM REV CODE 636 W HCPCS: Performed by: PHYSICIAN ASSISTANT

## 2022-07-20 PROCEDURE — 96372 THER/PROPH/DIAG INJ SC/IM: CPT | Performed by: PHYSICIAN ASSISTANT

## 2022-07-20 PROCEDURE — 99285 EMERGENCY DEPT VISIT HI MDM: CPT | Mod: 25

## 2022-07-20 RX ORDER — KETOROLAC TROMETHAMINE 30 MG/ML
10 INJECTION, SOLUTION INTRAMUSCULAR; INTRAVENOUS
Status: COMPLETED | OUTPATIENT
Start: 2022-07-20 | End: 2022-07-20

## 2022-07-20 RX ADMIN — KETOROLAC TROMETHAMINE 10 MG: 30 INJECTION, SOLUTION INTRAMUSCULAR; INTRAVENOUS at 10:07

## 2022-07-21 VITALS
OXYGEN SATURATION: 100 % | TEMPERATURE: 98 F | SYSTOLIC BLOOD PRESSURE: 128 MMHG | DIASTOLIC BLOOD PRESSURE: 82 MMHG | HEART RATE: 87 BPM | RESPIRATION RATE: 17 BRPM | WEIGHT: 189 LBS | BODY MASS INDEX: 32.44 KG/M2

## 2022-07-21 PROCEDURE — 96372 THER/PROPH/DIAG INJ SC/IM: CPT | Performed by: EMERGENCY MEDICINE

## 2022-07-21 PROCEDURE — 63600175 PHARM REV CODE 636 W HCPCS: Performed by: EMERGENCY MEDICINE

## 2022-07-21 PROCEDURE — 25000003 PHARM REV CODE 250: Performed by: EMERGENCY MEDICINE

## 2022-07-21 RX ORDER — LIDOCAINE 50 MG/G
1 PATCH TOPICAL
Status: DISCONTINUED | OUTPATIENT
Start: 2022-07-21 | End: 2022-07-21 | Stop reason: HOSPADM

## 2022-07-21 RX ORDER — ACETAMINOPHEN 500 MG
1000 TABLET ORAL
Status: COMPLETED | OUTPATIENT
Start: 2022-07-21 | End: 2022-07-21

## 2022-07-21 RX ORDER — ORPHENADRINE CITRATE 30 MG/ML
60 INJECTION INTRAMUSCULAR; INTRAVENOUS
Status: COMPLETED | OUTPATIENT
Start: 2022-07-21 | End: 2022-07-21

## 2022-07-21 RX ORDER — LIDOCAINE 50 MG/G
1 PATCH TOPICAL DAILY
Qty: 30 PATCH | Refills: 0 | Status: SHIPPED | OUTPATIENT
Start: 2022-07-21

## 2022-07-21 RX ORDER — LOPERAMIDE HYDROCHLORIDE 2 MG/1
2 CAPSULE ORAL 4 TIMES DAILY PRN
Qty: 12 CAPSULE | Refills: 0 | Status: SHIPPED | OUTPATIENT
Start: 2022-07-21 | End: 2022-07-31

## 2022-07-21 RX ORDER — CYCLOBENZAPRINE HCL 10 MG
10 TABLET ORAL 3 TIMES DAILY PRN
Qty: 15 TABLET | Refills: 0 | Status: SHIPPED | OUTPATIENT
Start: 2022-07-21 | End: 2022-07-26

## 2022-07-21 RX ADMIN — ACETAMINOPHEN 1000 MG: 500 TABLET ORAL at 12:07

## 2022-07-21 RX ADMIN — LIDOCAINE 1 PATCH: 50 PATCH CUTANEOUS at 12:07

## 2022-07-21 RX ADMIN — ORPHENADRINE CITRATE 60 MG: 30 INJECTION INTRAMUSCULAR; INTRAVENOUS at 12:07

## 2022-07-21 NOTE — FIRST PROVIDER EVALUATION
Emergency Department TeleTriage Encounter Note      CHIEF COMPLAINT    Chief Complaint   Patient presents with    Back Pain     Patient is ambulatory. Complains of lower back pain that radiates down left leg x 2 weeks. Reports intermittent swelling to left lower leg. No swelling noted at this time. Patient reports history of DVT to same leg.        VITAL SIGNS   Initial Vitals [07/20/22 2007]   BP Pulse Resp Temp SpO2   (!) 172/82 97 18 98.4 °F (36.9 °C) 99 %      MAP       --            ALLERGIES    Review of patient's allergies indicates:   Allergen Reactions    Corticosteroids (glucocorticoids) Swelling       PROVIDER TRIAGE NOTE  52-year-old female to the ER for evaluation of back pain and left calf swelling.  Does not report trauma to the back.  Complains of swelling to the calf that has gotten a little bit better.  Reports similar swelling the past when she was diagnosed with a DVT.  No chest pain.  No shortness of breath.    ORDERS  Labs Reviewed - No data to display    ED Orders (720h ago, onward)    Start Ordered     Status Ordering Provider    07/20/22 2130 07/20/22 2124  ketorolac injection 9.999 mg  ED 1 Time         Ordered CARIDAD TORRES    Unscheduled 07/20/22 2123  US Lower Extremity Veins Left  1 time imaging         Ordered CARIDAD TORRES.            Virtual Visit Note: The provider triage portion of this emergency department evaluation and documentation was performed via [employer]weet[employer], a HIPAA-compliant telemedicine application, in concert with a tele-presenter in the room. A face to face patient evaluation with one of my colleagues will occur once the patient is placed in an emergency department room.      DISCLAIMER: This note was prepared with [employer] voice recognition transcription software. Garbled syntax, mangled pronouns, and other bizarre constructions may be attributed to that software system.

## 2022-07-21 NOTE — ED PROVIDER NOTES
"Encounter Date: 7/20/2022    SCRIBE #1 NOTE: ICurry am scribing for, and in the presence of, Dr. Miller.       History     Chief Complaint   Patient presents with    Back Pain     Patient is ambulatory. Complains of lower back pain that radiates down left leg x 2 weeks. Reports intermittent swelling to left lower leg. No swelling noted at this time. Patient reports history of DVT to same leg.      Lorelei Norris is a 52 y.o. female with a past medical history of diabetes and DVT who presents to the emergency department for evaluation of back pain. She reports lower back pain that radiates down her left leg and has been present for "a while." She reports that she came to the ED because the pain became unbearable tonight. Pain is aggravated by walking and standing up. She also reports diarrhea that started one week ago. She takes gabapentin daily and has been given a Toradol injection without relief.Patient has not seen a pain specialist. No known drug allergies.    The history is provided by the patient. No  was used.     Review of patient's allergies indicates:   Allergen Reactions    Corticosteroids (glucocorticoids) Swelling     Past Medical History:   Diagnosis Date    Abnormal Pap smear of cervix     5 years ago    Diabetes     DVT (deep venous thrombosis) 06/2018    left leg    Hypertension      Past Surgical History:   Procedure Laterality Date    HYSTERECTOMY      LAPAROSCOPIC CHOLECYSTECTOMY N/A 8/31/2018    Procedure: CHOLECYSTECTOMY, LAPAROSCOPIC;  Surgeon: Aki Watt MD;  Location: [facility];  Service: General;  Laterality: N/A;  No PAT Available-Anes in AM  VIDEO    TONSILLECTOMY, ADENOIDECTOMY  06/2017    TUBAL LIGATION       No family history on file.  Social History     Tobacco Use    Smoking status: Never Smoker    Smokeless tobacco: Never Used   Substance Use Topics    Alcohol use: No    Drug use: No     Review of Systems   Constitutional: " Negative for fever.   HENT: Negative for sore throat.    Eyes: Negative for pain.   Respiratory: Negative for cough.    Cardiovascular: Negative for chest pain.   Gastrointestinal: Positive for diarrhea.   Genitourinary: Negative for dysuria.   Musculoskeletal: Positive for back pain.   Skin: Negative for rash.   Neurological: Negative for syncope.   All other systems reviewed and are negative.      Physical Exam     Initial Vitals [07/20/22 2007]   BP Pulse Resp Temp SpO2   (!) 172/82 97 18 98.4 °F (36.9 °C) 99 %      MAP       --         Physical Exam    Vitals reviewed.  Constitutional: She appears well-developed and well-nourished. No distress.   HENT:   Head: Normocephalic and atraumatic.   Eyes: Pupils are equal, round, and reactive to light.   Neck:   Normal range of motion.  Cardiovascular: Normal rate and regular rhythm.   Pulmonary/Chest: Breath sounds normal. No respiratory distress.   Abdominal: Abdomen is soft. She exhibits no distension. There is no abdominal tenderness.   Musculoskeletal:         General: Normal range of motion.      Cervical back: Normal range of motion.      Comments: Reproducible paraspinal tenderness no spinal step-offs deformities skin changes     Neurological: She is alert and oriented to person, place, and time. She has normal strength. GCS score is 15. GCS eye subscore is 4. GCS verbal subscore is 5. GCS motor subscore is 6.   Skin: Skin is warm and dry. Capillary refill takes less than 2 seconds.   Psychiatric: She has a normal mood and affect. Thought content normal.         ED Course   Procedures  Labs Reviewed - No data to display       Imaging Results          US Lower Extremity Veins Left (Final result)  Result time 07/20/22 23:23:12    Final result by Bhupinder Gutiérrez MD (07/20/22 23:23:12)                 Impression:      No evidence of DVT in the left lower extremity.      Electronically signed by: Bhupinder Gutiérrez MD  Date:    07/20/2022  Time:    23:23              Narrative:    EXAMINATION:  US LOWER EXTREMITY VEINS LEFT    CLINICAL HISTORY:  Other specified soft tissue disorders.    TECHNIQUE:  Duplex and color flow Doppler evaluation and graded compression of the left lower extremity veins was performed.    COMPARISON:  09/14/2018.    FINDINGS:  The imaged veins of the left lower extremity demonstrate normal gray scale graded compression, color flow and Doppler waveforms, normal respiratory phasicity and augmentation.  Comparison imaging of the right common femoral vein is unremarkable.  No discreet fluid collections.                                 Medications   LIDOcaine 5 % patch 1 patch (1 patch Transdermal Patch Applied 7/21/22 0020)   ketorolac injection 9.999 mg (9.999 mg Intramuscular Given 7/20/22 2202)   acetaminophen tablet 1,000 mg (1,000 mg Oral Given 7/21/22 0020)   orphenadrine injection 60 mg (60 mg Intramuscular Given 7/21/22 0020)     Medical Decision Making:   Initial Assessment:   Pleasant 52-year-old female presents the ER for evaluation of flare-up of low back pain.  Atraumatic.  No symptoms.  Reports of low back radiating to her left leg, worse with ambulating.  She reports she has sensation that back feels tight.  Will appearing no acute distress neurologically intact.  Reproducible paraspinal lumbar tenderness noted on exam without any side spinal midline pathology noted.  DVT study was obtained via tele triage, although patient reports no leg swelling nor is she concerned for DVT.  Likely sciatica, flare-up chronic low back pain.  Will support reassess, likely discharge  Clinical Tests:   Radiological Study: Ordered and Reviewed          Scribe Attestation:   Scribe #1: I performed the above scribed service and the documentation accurately describes the services I performed. I attest to the accuracy of the note.        ED Course as of 07/21/22 0312   u Jul 21, 2022   0111 Resting in bed no acute distress patient feeling better.  Of note patient  reports she also had diarrhea, nonbloody would like medication for that.  Will give loperamide.  Will also discuss for back pain Tylenol, Motrin, Flexeril and Lidoderm patch.  Will place referral for pain specialist.  Return discussed patient will be discharged. [SE]      ED Course User Index  [SE] Altaf Miller MD             Clinical Impression:   Final diagnoses:  [M79.89] Left leg swelling  [S39.012A] Lumbar strain, initial encounter (Primary)  [M54.32] Sciatica of left side          ED Disposition Condition    Discharge Stable       My Scribe Attestation: I acknowledge that the documentation on this chart was provided by described on the date of service noted above and that the documentation in the chart accurately reflects work and decisions made by me alone.    ED Prescriptions     Medication Sig Dispense Start Date End Date Auth. Provider    cyclobenzaprine (FLEXERIL) 10 MG tablet Take 1 tablet (10 mg total) by mouth 3 (three) times daily as needed (low back pain/spasm). 15 tablet 7/21/2022 7/26/2022 Altaf Miller MD    LIDOcaine (LIDODERM) 5 % Place 1 patch onto the skin once daily. Remove & Discard patch within 12 hours or as directed by MD 30 patch 7/21/2022  Altaf Miller MD    loperamide (IMODIUM) 2 mg capsule Take 1 capsule (2 mg total) by mouth 4 (four) times daily as needed for Diarrhea. 12 capsule 7/21/2022 7/31/2022 Altaf Miller MD        Follow-up Information     Follow up With Specialties Details Why Contact [facility] Schedule an appointment as soon as possible for a visit  As needed [address] [place] [address] [id number] [phone]           Altaf Miller MD  07/21/22 [id number]

## 2022-07-21 NOTE — ED TRIAGE NOTES
Patient ambulatory to ER with complaints of lower back pain that radiates to left lower leg. Patient states lower leg will have intermittent swelling. Denies history of sciatica. States she has had a DVT in past to left leg. Has not taken any pain relievers at home due to concern it would interfere with her home medicines. Toradol was given while patient was in lobby. States did not help with pain.    Review of patient's allergies indicates:   Allergen Reactions    Corticosteroids (glucocorticoids) Swelling        Patient has verified the spelling of their name and  on armband.   APPEARANCE: Patient is alert, calm, oriented x 4, and does not appear distressed.  SKIN: Skin is normal for race, warm, and dry. Normal skin turgor and mucous membranes moist.  CARDIAC: Normal rate and rhythm, no murmur heard.   RESPIRATORY:Normal rate and effort. Breath sounds clear bilaterally throughout chest. Respirations are equal and unlabored.    GASTRO: Bowel sounds normal, abdomen is soft, no tenderness, and no abdominal distention.  MUSCLE: Full ROM. No bony tenderness or soft tissue tenderness. No obvious deformity. +lower back pain that radiates down left leg, pain rated 10/10

## 2022-08-01 ENCOUNTER — PATIENT OUTREACH (OUTPATIENT)
Dept: EMERGENCY MEDICINE | Facility: HOSPITAL | Age: 52
End: 2022-08-01
Payer: MEDICAID

## 2022-08-01 NOTE — PROGRESS NOTES
.Why Should I Have My Own Doctor or Nurse Practitioner (PCP) to Take Care of Me  What is a PCP (Primary Care Provider)?    A primary care provider is a doctor or nurse practitioner who you can call for an appointment and will see you when you are sick.    You will also be seen at scheduled appointment times during the year to check on your diabetes, or high blood pressure, or heart disease.    Why see the same PCP (doctor/nurse practitioner)?    You can be seen faster when you are sick           You, the PCP (doctor/nurse practitioner) and the office staff get to know each other; you begin to trust them to care for you. You take part in your health choices.   All of you together are a team.    Your medicine is looked at every time you visit, to be sure you are taking the medicine, as the PCP (doctor/nurse practitioner) ordered.    Your PCP (doctor/nurse practitioner) and their staff help keep you healthy and out of the hospital.  They can catch sicknesses earlier by ordering tests once a year to stop or prevent the sickness from getting worse.      Your PCP (doctor/nurse practitioner) can send you to providers who specialize (heart/bone/lung) if you need.  They and their office staff help keep track of your seeing other providers (doctors/nurse practitioners) and tests (CT/ MRIs/ X Rays)) taken.        PCPs want you to stay healthy.  Let us care for you.      .DASH Meal Plan  (Healthy eating plan)    Why use this meal plan?    This healthy meal plan lowers blood pressure.  This meal plan lowers the chance of you getting Diabetes, heart disease and stroke.  This meal plan also helps you lose weight.    DASH balanced meal plannin or more servings of fruits and vegetables every day.  At each meal, try to fill half of your plated with fruits and vegetables                    Eat 6-8 servings of whole grains every day.  Whole grains are:  Brown rice, oatmeal, whole wheat bread, whole grain, low salt cereals, Liz  bread, whole wheat flour tortillas                5 ounces of meat, chicken, or fish each day (3-ounce size of serving of meat is the same size as a deck of playing cards)  Fish like sardines, salmon and mackerel are also good for your heart.            2 servings of low-fat dairy each day (Milk, cottage cheese, yogurt)          Do Not:  Use More than 1,500 milligrams of salt a day if you have high blood pressure (2/3 teaspoon)    You would look at labels and total milligrams of salt per day        Do Not Add salt when cooking      Do Not Salt food before eating    Do Not Eat fried foods  Do Not Cook using butter, stick margarine, lard, shortening, coconut oil    Do Not Buy regular canned vegetables, pickles, or olives (too much salt; use low salt or no salt)  Do Not Do not buy premade or frozen meals    Do Not Eat fast food/buffet           Speak with your Doctor/Nurse practitioner about exercising 30 minutes a day          Modified from DASH eating plan education  .        What Do I Do If I Wake Up Sick                                                     If you wake up sick, or you start to fill sick during the day, try these tips to get care and start to feel better soon.                                                                                                                              As soon as you start to feel bad, call your doctor's office and ask for same day or next day visit appointment.        If you cannot be seen with your doctor's office within 24 hours, you can go to an urgent care and be seen.          How to stay well:     Take your medicine as ordered by your doctor      Fill your Medicine before you run out      Exercise       Enjoy walking in the sunlight daily  Keep your scheduled clinic appointments      Keep you scheduled yearly wellness visits

## 2022-08-05 ENCOUNTER — HOSPITAL ENCOUNTER (EMERGENCY)
Facility: HOSPITAL | Age: 52
Discharge: HOME | End: 2022-08-05
Attending: FAMILY MEDICINE
Payer: MEDICAID

## 2022-08-05 VITALS
DIASTOLIC BLOOD PRESSURE: 66 MMHG | WEIGHT: 197.75 LBS | HEIGHT: 64 IN | RESPIRATION RATE: 18 BRPM | OXYGEN SATURATION: 97 % | SYSTOLIC BLOOD PRESSURE: 147 MMHG | BODY MASS INDEX: 33.76 KG/M2 | HEART RATE: 103 BPM | TEMPERATURE: 98 F

## 2022-08-05 DIAGNOSIS — M54.42 CHRONIC MIDLINE LOW BACK PAIN WITH LEFT-SIDED SCIATICA: Primary | ICD-10-CM

## 2022-08-05 DIAGNOSIS — G89.29 CHRONIC MIDLINE LOW BACK PAIN WITH LEFT-SIDED SCIATICA: Primary | ICD-10-CM

## 2022-08-05 DIAGNOSIS — I10 ESSENTIAL HYPERTENSION: ICD-10-CM

## 2022-08-05 DIAGNOSIS — M48.061 FORAMINAL STENOSIS OF LUMBAR REGION: ICD-10-CM

## 2022-08-05 DIAGNOSIS — M51.36 DDD (DEGENERATIVE DISC DISEASE), LUMBAR: ICD-10-CM

## 2022-08-05 PROCEDURE — 63600175 PHARM REV CODE 636 W HCPCS: Performed by: PHYSICIAN ASSISTANT

## 2022-08-05 PROCEDURE — 25000003 PHARM REV CODE 250: Performed by: PHYSICIAN ASSISTANT

## 2022-08-05 PROCEDURE — 96372 THER/PROPH/DIAG INJ SC/IM: CPT | Performed by: PHYSICIAN ASSISTANT

## 2022-08-05 PROCEDURE — 99285 EMERGENCY DEPT VISIT HI MDM: CPT | Mod: 25

## 2022-08-05 RX ORDER — HYDROCODONE BITARTRATE AND ACETAMINOPHEN 7.5; 325 MG/1; MG/1
1 TABLET ORAL ONCE
Status: COMPLETED | OUTPATIENT
Start: 2022-08-05 | End: 2022-08-05

## 2022-08-05 RX ORDER — HYDROCODONE BITARTRATE AND ACETAMINOPHEN 5; 325 MG/1; MG/1
1 TABLET ORAL EVERY 8 HOURS PRN
Qty: 9 TABLET | Refills: 0 | Status: SHIPPED | OUTPATIENT
Start: 2022-08-05 | End: 2022-08-08

## 2022-08-05 RX ORDER — KETOROLAC TROMETHAMINE 30 MG/ML
30 INJECTION, SOLUTION INTRAMUSCULAR; INTRAVENOUS
Status: COMPLETED | OUTPATIENT
Start: 2022-08-05 | End: 2022-08-05

## 2022-08-05 RX ORDER — INDOMETHACIN 50 MG/1
50 CAPSULE ORAL
Qty: 15 CAPSULE | Refills: 0 | Status: SHIPPED | OUTPATIENT
Start: 2022-08-05 | End: 2023-03-18

## 2022-08-05 RX ADMIN — KETOROLAC TROMETHAMINE 30 MG: 30 INJECTION, SOLUTION INTRAMUSCULAR; INTRAVENOUS at 11:08

## 2022-08-05 RX ADMIN — HYDROCODONE BITARTRATE AND ACETAMINOPHEN 1 TABLET: 7.5; 325 TABLET ORAL at 11:08

## 2022-08-05 NOTE — DISCHARGE INSTRUCTIONS
Report to Emergency Department if symptoms return or worsen; [facility] Medicine Clinic Within 1 to 2 days, It is important that you follow up with your primary care provider or specialist if indicated for further evaluation, workup, and treatment as necessary. The exam and treatment you received in Emergency Department was for an urgent problem and NOT INTENDED AS COMPLETE CARE. It is important that you FOLLOW UP with a doctor for ongoing care. If your symptoms become WORSE or you DO NOT IMPROVE and you are unable to reach your health care provider, you should RETURN to the Emergency Department. The Emergency Department provider has provided a PRELIMINARY INTERPRETATION of all your studies. A final interpretation may be done after you are discharged. If a change in your diagnosis or treatment is needed WE WILL CONTACT YOU. It is critical that we have a CURRENT PHONE NUMBER FOR YOU.

## 2022-08-05 NOTE — ED PROVIDER NOTES
Encounter Date: 8/5/2022       History     Chief Complaint   Patient presents with    Back Pain     C/o lower back pain chronic. States got worse last pm.      53 yo F w/ PMHx significnat for chronic low back pain, DM, HTN & hx of DVT presents to ED c/o worsening of chronic low back pain. Reports pain is located in mid low back & radiates down into LLE. Patient was seen at ED in [place] on 7/21 & had LLE US which showed no DVT. Patient was discharged w/ flexeril & referred to pain management clinic, but reports no one ever answered when she attempted to call clinic & also states flexeril isn't alleviating pain. Patient reports intermittent episodes of urinary incontinence. Denies saddle anesthesia, bowel incontinence, bowel/bladder retention, dysuria, hematuria, F/C, night sweats, unexplained weight loss, hx of IVDU. VSS on arrival w/ NAD.        Review of patient's allergies indicates:   Allergen Reactions    Corticosteroids (glucocorticoids) Swelling     Past Medical History:   Diagnosis Date    Abnormal Pap smear of cervix     5 years ago    Diabetes     DVT (deep venous thrombosis) 06/2018    left leg    Hypertension      Past Surgical History:   Procedure Laterality Date    HYSTERECTOMY      LAPAROSCOPIC CHOLECYSTECTOMY N/A 8/31/2018    Procedure: CHOLECYSTECTOMY, LAPAROSCOPIC;  Surgeon: Aki Watt MD;  Location: [facility] OR;  Service: General;  Laterality: N/A;  No PAT Available-Anes in AM  VIDEO    TONSILLECTOMY, ADENOIDECTOMY  06/2017    TUBAL LIGATION       History reviewed. No pertinent family history.  Social History     Tobacco Use    Smoking status: Never Smoker    Smokeless tobacco: Never Used   Substance Use Topics    Alcohol use: No    Drug use: No     Review of Systems   Constitutional: Negative for chills, fatigue, fever and unexpected weight change.   HENT: Negative for congestion, rhinorrhea and sore throat.    Respiratory: Negative for cough and shortness of breath.     Cardiovascular: Negative for chest pain, palpitations and leg swelling.   Gastrointestinal: Negative for abdominal pain, blood in stool, diarrhea, nausea and vomiting.   Endocrine: Negative for polydipsia, polyphagia and polyuria.   Genitourinary: Positive for enuresis. Negative for decreased urine volume, difficulty urinating, dysuria, frequency, genital sores, hematuria, pelvic pain, urgency, vaginal bleeding and vaginal discharge.   Musculoskeletal: Positive for back pain. Negative for gait problem, joint swelling and neck pain.   Skin: Negative for rash.   Allergic/Immunologic: Negative for immunocompromised state.   Neurological: Negative for dizziness, syncope, weakness, numbness and headaches.   Psychiatric/Behavioral: Negative for confusion.   All other systems reviewed and are negative.      Physical Exam     Initial Vitals [08/05/22 1052]   BP Pulse Resp Temp SpO2   (!) 147/66 103 20 98.1 °F (36.7 °C) 97 %      MAP       --         Physical Exam    Nursing note and vitals reviewed.  Constitutional: She appears well-developed and well-nourished. No distress.   HENT:   Head: Normocephalic and atraumatic.   Mouth/Throat: Oropharynx is clear and moist.   Eyes: Conjunctivae and EOM are normal. Pupils are equal, round, and reactive to light.   Neck: Neck supple.   Normal range of motion.  Cardiovascular: Normal rate, regular rhythm, normal heart sounds and intact distal pulses. Exam reveals no gallop and no friction rub.    No murmur heard.  Pulmonary/Chest: Breath sounds normal. No respiratory distress. She has no wheezes. She has no rhonchi. She has no rales.   Abdominal: Abdomen is soft. Bowel sounds are normal. She exhibits no distension and no mass. There is no abdominal tenderness. There is no rebound and no guarding.   Genitourinary: Rectum:      No abnormal anal tone.     Musculoskeletal:         General: No edema.      Cervical back: Normal range of motion and neck supple.      Lumbar back: Tenderness  and bony tenderness present. No deformity, signs of trauma or spasms. Positive left straight leg raise test. Negative right straight leg raise test.      Comments: Midline body tenderness more prominent than paraspinal muscle tenderness. No step-offs or obvious deformities noted on physical exam.     Neurological: She is alert and oriented to person, place, and time. She has normal strength and normal reflexes. No sensory deficit.   Skin: Skin is warm and dry. Capillary refill takes less than 2 seconds. No rash noted. No pallor.   Psychiatric: She has a normal mood and affect. Thought content normal.         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  Contrast was not administered.  Automatic exposure control (AEC) is utilized to reduce patient radiation exposure.    COMPARISON:  Plain film dated 03/17/2022    FINDINGS:  The vertebral body heights and alignment are well maintained.  No fracture seen or dislocation is seen.  There is multilevel facet arthropathy seen throughout the lumbar spine.  There appears to be a broad-base disc osteophyte complex at the level of L4-5 and L5-S1 which cause bowel foraminal  stenosis.  No transverse process fracture is seen.  No spinous process fracture seen.  Visualized portion of sacrum show some degenerative changes in sacroiliac joint on the left side.    Incidentally noted are extensive bilateral medullary calcifications in both kidneys.  No obvious hydronephrosis is seen on this examination.                                 Medications   ketorolac injection 30 mg (30 mg Intramuscular Given 8/5/22 1136)   HYDROcodone-acetaminophen 7.5-325 mg per tablet 1 tablet (1 tablet Oral Given 8/5/22 1136)     Medical Decision Making:   Clinical Tests:   Lab Tests: Reviewed  Radiological Study: Ordered and Reviewed  CT shows degenerative changes & foraminal stenosis. No evidence of cauda equina on physical exam. Will discharge w/ NSAID & short course of narcotic pain meds. Will refer to [facility] neurosurgery due to progressive nature of symptoms. Instructed to follow-up w/ PCP. ED precautions given for red flag symptoms.                      Clinical Impression:   Final diagnoses:  [M51.36] DDD (degenerative disc disease), lumbar  [M48.061] Foraminal stenosis of lumbar region  [M54.42, G89.29] Chronic midline low back pain with left-sided sciatica (Primary)  [I10] Essential hypertension          ED Disposition Condition    Discharge Stable        ED Prescriptions     Medication Sig Dispense Start Date End Date Auth. Provider    indomethacin (INDOCIN) 50 MG capsule Take 1 capsule (50 mg total) by mouth 3 (three) times daily with meals. 15 capsule 8/5/2022  DAMARIS Sahni    HYDROcodone-acetaminophen (NORCO) 5-325 mg per tablet Take 1 tablet by mouth every 8 (eight) hours as needed for Pain. Take only for pain not controlled by indomethacin. 9 tablet 8/5/2022 8/8/2022 DAMARIS Sahni        Follow-up Information     Follow up With Specialties Details Why Contact Info Additional Information    [facility] - Emergency Dept Emergency Medicine  If symptoms worsen [address]  [facility] [address]  [phone]     [facility] - Internal Medicine Internal Medicine In 2 weeks  [address] [facility] [address]  [phone] Internal Medicine Clinic Entrance #1    [facility] - Neurosurgery  In 2 weeks  [address]  5th Floor  [facility] [address]  [phone]              DAMARIS Sahni  08/05/22 1227

## 2022-08-26 DIAGNOSIS — Z86.718 HISTORY OF DVT (DEEP VEIN THROMBOSIS): ICD-10-CM

## 2022-08-26 DIAGNOSIS — Z13.220 SCREENING FOR LIPID DISORDERS: ICD-10-CM

## 2022-08-26 DIAGNOSIS — Z13.21 ENCOUNTER FOR VITAMIN DEFICIENCY SCREENING: ICD-10-CM

## 2022-08-26 DIAGNOSIS — E11.9 TYPE 2 DIABETES MELLITUS WITHOUT COMPLICATION, WITH LONG-TERM CURRENT USE OF INSULIN: ICD-10-CM

## 2022-08-26 DIAGNOSIS — Z79.4 TYPE 2 DIABETES MELLITUS WITHOUT COMPLICATION, WITH LONG-TERM CURRENT USE OF INSULIN: ICD-10-CM

## 2022-08-26 DIAGNOSIS — Z13.29 SCREENING FOR THYROID DISORDER: ICD-10-CM

## 2022-08-26 DIAGNOSIS — Z00.00 ENCOUNTER FOR WELLNESS EXAMINATION IN ADULT: Primary | ICD-10-CM

## 2022-08-26 DIAGNOSIS — Z11.3 SCREEN FOR STD (SEXUALLY TRANSMITTED DISEASE): ICD-10-CM

## 2022-08-26 DIAGNOSIS — I10 HYPERTENSION, UNSPECIFIED TYPE: ICD-10-CM

## 2022-09-08 ENCOUNTER — HOSPITAL ENCOUNTER (EMERGENCY)
Facility: HOSPITAL | Age: 52
Discharge: HOME | End: 2022-09-08
Attending: FAMILY MEDICINE
Payer: MEDICAID

## 2022-09-08 VITALS
HEART RATE: 96 BPM | BODY MASS INDEX: 32.37 KG/M2 | WEIGHT: 189.63 LBS | HEIGHT: 64 IN | OXYGEN SATURATION: 98 % | TEMPERATURE: 100 F | RESPIRATION RATE: 18 BRPM | SYSTOLIC BLOOD PRESSURE: 163 MMHG | DIASTOLIC BLOOD PRESSURE: 114 MMHG

## 2022-09-08 DIAGNOSIS — M79.89 LEFT LEG SWELLING: ICD-10-CM

## 2022-09-08 DIAGNOSIS — R50.9 FEVER OF UNKNOWN ORIGIN: Primary | ICD-10-CM

## 2022-09-08 DIAGNOSIS — R20.2 PARESTHESIAS: ICD-10-CM

## 2022-09-08 DIAGNOSIS — F41.9 ANXIETY: ICD-10-CM

## 2022-09-08 LAB
ALBUMIN SERPL-MCNC: 3.3 GM/DL (ref 3.5–5)
ALBUMIN/GLOB SERPL: 0.8 RATIO (ref 1.1–2)
ALP SERPL-CCNC: 138 UNIT/L (ref 40–150)
ALT SERPL-CCNC: 11 UNIT/L (ref 0–55)
APPEARANCE UR: CLEAR
AST SERPL-CCNC: 10 UNIT/L (ref 5–34)
BACTERIA #/AREA URNS AUTO: ABNORMAL /HPF
BASOPHILS # BLD AUTO: 0.05 X10(3)/MCL (ref 0–0.2)
BASOPHILS NFR BLD AUTO: 0.8 %
BILIRUB UR QL STRIP.AUTO: NEGATIVE MG/DL
BILIRUBIN DIRECT+TOT PNL SERPL-MCNC: 0.2 MG/DL
BUN SERPL-MCNC: 11.8 MG/DL (ref 9.8–20.1)
CALCIUM SERPL-MCNC: 9.4 MG/DL (ref 8.4–10.2)
CHLORIDE SERPL-SCNC: 101 MMOL/L (ref 98–107)
CO2 SERPL-SCNC: 26 MMOL/L (ref 22–29)
COLOR UR AUTO: COLORLESS
CREAT SERPL-MCNC: 1.17 MG/DL (ref 0.55–1.02)
EOSINOPHIL # BLD AUTO: 0.14 X10(3)/MCL (ref 0–0.9)
EOSINOPHIL NFR BLD AUTO: 2.4 %
ERYTHROCYTE [DISTWIDTH] IN BLOOD BY AUTOMATED COUNT: 12.2 % (ref 11.5–17)
FLUAV AG UPPER RESP QL IA.RAPID: NOT DETECTED
FLUBV AG UPPER RESP QL IA.RAPID: NOT DETECTED
GFR SERPLBLD CREATININE-BSD FMLA CKD-EPI: 56 MLS/MIN/1.73/M2
GLOBULIN SER-MCNC: 4 GM/DL (ref 2.4–3.5)
GLUCOSE SERPL-MCNC: 368 MG/DL (ref 74–100)
GLUCOSE UR QL STRIP.AUTO: ABNORMAL MG/DL
HCT VFR BLD AUTO: 40.9 % (ref 37–47)
HGB BLD-MCNC: 13.1 GM/DL (ref 12–16)
HOLD SPECIMEN: NORMAL
HYALINE CASTS #/AREA URNS LPF: ABNORMAL /LPF
IMM GRANULOCYTES # BLD AUTO: 0.01 X10(3)/MCL (ref 0–0.04)
IMM GRANULOCYTES NFR BLD AUTO: 0.2 %
KETONES UR QL STRIP.AUTO: NEGATIVE MG/DL
LEUKOCYTE ESTERASE UR QL STRIP.AUTO: NEGATIVE UNIT/L
LYMPHOCYTES # BLD AUTO: 2.76 X10(3)/MCL (ref 0.6–4.6)
LYMPHOCYTES NFR BLD AUTO: 46.8 %
MCH RBC QN AUTO: 28.3 PG (ref 27–31)
MCHC RBC AUTO-ENTMCNC: 32 MG/DL (ref 33–36)
MCV RBC AUTO: 88.3 FL (ref 80–94)
MONOCYTES # BLD AUTO: 0.42 X10(3)/MCL (ref 0.1–1.3)
MONOCYTES NFR BLD AUTO: 7.1 %
NEUTROPHILS # BLD AUTO: 2.5 X10(3)/MCL (ref 2.1–9.2)
NEUTROPHILS NFR BLD AUTO: 42.7 %
NITRITE UR QL STRIP.AUTO: NEGATIVE
NRBC BLD AUTO-RTO: 0 %
PH UR STRIP.AUTO: 6 [PH]
PLATELET # BLD AUTO: 293 X10(3)/MCL (ref 130–400)
PMV BLD AUTO: 10.7 FL (ref 7.4–10.4)
POTASSIUM SERPL-SCNC: 3.7 MMOL/L (ref 3.5–5.1)
PROT SERPL-MCNC: 7.3 GM/DL (ref 6.4–8.3)
PROT UR QL STRIP.AUTO: NEGATIVE MG/DL
RBC # BLD AUTO: 4.63 X10(6)/MCL (ref 4.2–5.4)
RBC #/AREA URNS AUTO: ABNORMAL /HPF
RBC UR QL AUTO: NEGATIVE UNIT/L
SARS-COV-2 RNA RESP QL NAA+PROBE: NOT DETECTED
SODIUM SERPL-SCNC: 139 MMOL/L (ref 136–145)
SP GR UR STRIP.AUTO: 1.02
SQUAMOUS #/AREA URNS LPF: ABNORMAL /HPF
TSH SERPL-ACNC: 0.45 UIU/ML (ref 0.35–4.94)
UROBILINOGEN UR STRIP-ACNC: NORMAL MG/DL
WBC # SPEC AUTO: 5.9 X10(3)/MCL (ref 4.5–11.5)
WBC #/AREA URNS AUTO: ABNORMAL /HPF

## 2022-09-08 PROCEDURE — 63600175 PHARM REV CODE 636 W HCPCS: Performed by: STUDENT IN AN ORGANIZED HEALTH CARE EDUCATION/TRAINING PROGRAM

## 2022-09-08 PROCEDURE — 96375 TX/PRO/DX INJ NEW DRUG ADDON: CPT

## 2022-09-08 PROCEDURE — 25000003 PHARM REV CODE 250: Performed by: FAMILY MEDICINE

## 2022-09-08 PROCEDURE — 96374 THER/PROPH/DIAG INJ IV PUSH: CPT

## 2022-09-08 PROCEDURE — 63600175 PHARM REV CODE 636 W HCPCS: Performed by: FAMILY MEDICINE

## 2022-09-08 PROCEDURE — 25000003 PHARM REV CODE 250: Performed by: PHYSICIAN ASSISTANT

## 2022-09-08 PROCEDURE — 96376 TX/PRO/DX INJ SAME DRUG ADON: CPT | Mod: 59

## 2022-09-08 PROCEDURE — 25500020 PHARM REV CODE 255

## 2022-09-08 PROCEDURE — 87636 SARSCOV2 & INF A&B AMP PRB: CPT | Performed by: PHYSICIAN ASSISTANT

## 2022-09-08 PROCEDURE — 36415 COLL VENOUS BLD VENIPUNCTURE: CPT | Performed by: FAMILY MEDICINE

## 2022-09-08 PROCEDURE — 99285 EMERGENCY DEPT VISIT HI MDM: CPT | Mod: 25

## 2022-09-08 PROCEDURE — 96361 HYDRATE IV INFUSION ADD-ON: CPT

## 2022-09-08 PROCEDURE — 63600175 PHARM REV CODE 636 W HCPCS: Performed by: PHYSICIAN ASSISTANT

## 2022-09-08 PROCEDURE — 85025 COMPLETE CBC W/AUTO DIFF WBC: CPT | Performed by: FAMILY MEDICINE

## 2022-09-08 PROCEDURE — 80053 COMPREHEN METABOLIC PANEL: CPT | Performed by: FAMILY MEDICINE

## 2022-09-08 PROCEDURE — 81001 URINALYSIS AUTO W/SCOPE: CPT | Performed by: FAMILY MEDICINE

## 2022-09-08 PROCEDURE — 93005 ELECTROCARDIOGRAM TRACING: CPT

## 2022-09-08 PROCEDURE — 84443 ASSAY THYROID STIM HORMONE: CPT | Performed by: FAMILY MEDICINE

## 2022-09-08 RX ORDER — GABAPENTIN 600 MG/1
600 TABLET ORAL 3 TIMES DAILY
Qty: 90 TABLET | Refills: 0 | Status: SHIPPED | OUTPATIENT
Start: 2022-09-08 | End: 2023-03-18

## 2022-09-08 RX ORDER — HYDROCODONE BITARTRATE AND ACETAMINOPHEN 5; 325 MG/1; MG/1
1 TABLET ORAL EVERY 6 HOURS PRN
Qty: 8 TABLET | Refills: 0 | OUTPATIENT
Start: 2022-09-08 | End: 2022-12-07

## 2022-09-08 RX ORDER — MORPHINE SULFATE 2 MG/ML
4 INJECTION, SOLUTION INTRAMUSCULAR; INTRAVENOUS
Status: COMPLETED | OUTPATIENT
Start: 2022-09-08 | End: 2022-09-08

## 2022-09-08 RX ORDER — ACETAMINOPHEN 500 MG
1000 TABLET ORAL
Status: COMPLETED | OUTPATIENT
Start: 2022-09-08 | End: 2022-09-08

## 2022-09-08 RX ORDER — KETOROLAC TROMETHAMINE 30 MG/ML
15 INJECTION, SOLUTION INTRAMUSCULAR; INTRAVENOUS
Status: COMPLETED | OUTPATIENT
Start: 2022-09-08 | End: 2022-09-08

## 2022-09-08 RX ORDER — LORAZEPAM 1 MG/1
0.5 TABLET ORAL EVERY 6 HOURS PRN
Qty: 7 TABLET | Refills: 0 | OUTPATIENT
Start: 2022-09-08 | End: 2023-02-07

## 2022-09-08 RX ORDER — LABETALOL HCL 20 MG/4 ML
10 SYRINGE (ML) INTRAVENOUS
Status: COMPLETED | OUTPATIENT
Start: 2022-09-08 | End: 2022-09-08

## 2022-09-08 RX ORDER — IBUPROFEN 800 MG/1
800 TABLET ORAL EVERY 8 HOURS PRN
Qty: 30 TABLET | Refills: 0 | Status: SHIPPED | OUTPATIENT
Start: 2022-09-08 | End: 2023-03-18

## 2022-09-08 RX ORDER — KETOROLAC TROMETHAMINE 30 MG/ML
30 INJECTION, SOLUTION INTRAMUSCULAR; INTRAVENOUS
Status: COMPLETED | OUTPATIENT
Start: 2022-09-08 | End: 2022-09-08

## 2022-09-08 RX ADMIN — IOHEXOL 100 ML: 350 INJECTION, SOLUTION INTRAVENOUS at 08:09

## 2022-09-08 RX ADMIN — LABETALOL HYDROCHLORIDE 10 MG: 5 INJECTION, SOLUTION INTRAVENOUS at 07:09

## 2022-09-08 RX ADMIN — KETOROLAC TROMETHAMINE 30 MG: 30 INJECTION, SOLUTION INTRAMUSCULAR; INTRAVENOUS at 04:09

## 2022-09-08 RX ADMIN — ACETAMINOPHEN 1000 MG: 500 TABLET ORAL at 07:09

## 2022-09-08 RX ADMIN — MORPHINE SULFATE 4 MG: 2 INJECTION, SOLUTION INTRAMUSCULAR; INTRAVENOUS at 06:09

## 2022-09-08 RX ADMIN — KETOROLAC TROMETHAMINE 15 MG: 30 INJECTION, SOLUTION INTRAMUSCULAR; INTRAVENOUS at 08:09

## 2022-09-08 RX ADMIN — SODIUM CHLORIDE 1000 ML: 9 INJECTION, SOLUTION INTRAVENOUS at 04:09

## 2022-09-08 NOTE — Clinical Note
"Lorelei Cortesgwyn Norris was seen and treated in our emergency department on 9/8/2022.  She may return to work on 09/09/2022.       If you have any questions or concerns, please don't hesitate to call.      Balta Barrera MD"

## 2022-09-08 NOTE — ED PROVIDER NOTES
"Encounter Date: 9/8/2022       History     Chief Complaint   Patient presents with    Leg Swelling     C/o left leg swelling and pain x 2 days. Referred here per PT place due to seeing blood clot.      52-year-old female presents emergency room with concerns of left lower extremity DVT.  Patient reports he was at a physical therapy office today, and they told her that she "had a DVT" in her left lower extremity.  Patient reports this was either from an x-ray, or from palpation, patient is unclear on how this was diagnosed.  Patient reports having left lower extremity swelling that has been worse over the last 3 days.  Reports pain to the area.  Denies fever chills.  Denies nausea or vomiting.  Patient reports a history DVTs in the past secondary to a "toe fracture".  Currently calm and cooperative.  Noted to be tachycardic on examination.    The history is provided by the patient.   Review of patient's allergies indicates:   Allergen Reactions    Corticosteroids (glucocorticoids) Swelling     Other reaction(s): reports allergic "to all steriods"     Past Medical History:   Diagnosis Date    Abnormal Pap smear of cervix     5 years ago    Diabetes     DVT (deep venous thrombosis) 06/2018    left leg    Hypertension      Past Surgical History:   Procedure Laterality Date    HYSTERECTOMY      LAPAROSCOPIC CHOLECYSTECTOMY N/A 8/31/2018    Procedure: CHOLECYSTECTOMY, LAPAROSCOPIC;  Surgeon: Aki Watt MD;  Location: [facility] OR;  Service: General;  Laterality: N/A;  No PAT Available-Anes in AM  VIDEO    TONSILLECTOMY, ADENOIDECTOMY  06/2017    TUBAL LIGATION       History reviewed. No pertinent family history.  Social History     Tobacco Use    Smoking status: Never    Smokeless tobacco: Never   Substance Use Topics    Alcohol use: No    Drug use: No     Review of Systems   Constitutional:  Negative for chills, fatigue and fever.   HENT:  Negative for ear pain, rhinorrhea and sore throat.    Eyes:  " Negative for photophobia and pain.   Respiratory:  Positive for shortness of breath. Negative for cough and wheezing.    Cardiovascular:  Positive for chest pain.   Gastrointestinal:  Negative for abdominal pain, diarrhea, nausea and vomiting.   Genitourinary:  Negative for dysuria.   Neurological:  Negative for dizziness, weakness and headaches.   All other systems reviewed and are negative.    Physical Exam     Initial Vitals [09/08/22 1605]   BP Pulse Resp Temp SpO2   (!) 179/93 (!) 128 (!) 24 100.2 °F (37.9 °C) 98 %      MAP       --         Physical Exam    Nursing note and vitals reviewed.  Constitutional: She appears well-developed and well-nourished. No distress.   HENT:   Head: Normocephalic and atraumatic.   Eyes: Conjunctivae and EOM are normal. Pupils are equal, round, and reactive to light.   Neck: Neck supple.   Normal range of motion.  Cardiovascular:  Regular rhythm and normal heart sounds.           tachycardia   Pulmonary/Chest: No respiratory distress. She has no wheezes. She has no rhonchi. She has no rales.   Abdominal: Abdomen is soft. Bowel sounds are normal. She exhibits no distension. There is no abdominal tenderness. There is no rebound and no guarding.   Musculoskeletal:         General: Normal range of motion.      Cervical back: Normal range of motion and neck supple.      Comments: 1+ bilateral lower extremity edema.  No erythema.  Calves appear equal in size bilaterally.  Negative Homans sign.     Neurological: She is alert and oriented to person, place, and time.   Skin: Skin is warm and dry. Capillary refill takes less than 2 seconds. No erythema.   Psychiatric: She has a normal mood and affect. Her behavior is normal. Judgment and thought content normal.       ED Course   Procedures  Labs Reviewed   URINALYSIS, REFLEX TO URINE CULTURE - Abnormal; Notable for the following components:       Result Value    Glucose, UA 4+ (*)     Squamous Epithelial Cells, UA Occ (*)     All other  components within normal limits   CBC WITH DIFFERENTIAL - Abnormal; Notable for the following components:    MCHC 32.0 (*)     MPV 10.7 (*)     All other components within normal limits   COMPREHENSIVE METABOLIC PANEL - Abnormal; Notable for the following components:    Glucose Level 368 (*)     Creatinine 1.17 (*)     Albumin Level 3.3 (*)     Globulin 4.0 (*)     Albumin/Globulin Ratio 0.8 (*)     All other components within normal limits   TSH - Normal   COVID/FLU A&B PCR - Normal   CBC W/ AUTO DIFFERENTIAL    Narrative:     The following orders were created for panel order CBC Auto Differential.  Procedure                               Abnormality         Status                     ---------                               -----------         ------                     CBC with Differential[[id number]]        Abnormal            Final result                 Please view results for these tests on the individual orders.   EXTRA TUBES    Narrative:     The following orders were created for panel order EXTRA TUBES.  Procedure                               Abnormality         Status                     ---------                               -----------         ------                     Light Blue Top Hold[[id number]]                              In process                 Lavender Top Hold[[id number]]                                In process                 Gold Top Hold[[id number]]                                    In process                 Blood Bank Hold[[id number]]                                  Final result                 Please view results for these tests on the individual orders.   LIGHT BLUE TOP HOLD   LAVENDER TOP HOLD   GOLD TOP HOLD   BLOOD BANK HOLD     EKG Readings: (Independently Interpreted)   09/08/2022 4:16 PM  Rate: 119 bpm  Rhythm: Sinus  Axis: Normal  Intervals: Normal  ST Changes: Normal  Impression: Sinus tachycardia; otherwise normal.       ECG Results              EKG 12-lead (In process)   Result time 09/08/22 16:54:11      In process by Interface, Lab In [facility] (09/08/22 16:54:11)                   Narrative:    Test Reason : R07.9,    Vent. Rate : 119 BPM     Atrial Rate : 119 BPM     P-R Int : 144 ms          QRS Dur : 080 ms      QT Int : 334 ms       P-R-T Axes : 064 057 035 degrees     QTc Int : 469 ms    Sinus tachycardia  Otherwise normal ECG  When compared with ECG of 22-JUN-2021 23:36,  No significant change was found    Referred By: [email or web address]   SELF           Confirmed By:                                   Imaging Results              CTA Chest Non-Coronary(PE Studies) (Final result)  Result time 09/08/22 21:05:14      Final result by Joaquin Devine MD (09/08/22 21:05:14)                   Impression:      1. No evidence for pulmonary embolism centrally given poor opacification of the pulmonary artery.  Cannot evaluate for segmental and subsegmental pulmonary emboli.  No other acute process of the thorax is identified.      Electronically signed by: Joaquin Devine MD  Date:    09/08/2022  Time:    21:05               Narrative:    EXAMINATION:  CTA CHEST NON CORONARY    CLINICAL HISTORY:  Pulmonary embolism (PE) suspected, unknown D-dimer;    TECHNIQUE:  Multiple cross-sectional images of the thorax were obtained after the intravenous administration of contrast.  Coronal and sagittal reformatted images were obtained.  An automated dose exposure technique was utilized.  This limits radiation does the patient.  MIP images were also obtained.    COMPARISON:  06/23/2021    FINDINGS:  Heart size within normal limits.  No reflux of contrast in the IVC.  No shift of the interventricular septum.  The course and caliber of the thoracic aorta is normal.  A 2 vessel aortic arch is identified.  The great vessels are widely patent.  The main pulmonary artery is of normal caliber and is inadequately opacified to assess for segmental and subsegmental pulmonary emboli.  No definitive central thrombus is  identified.  No evidence for hilar or mediastinal adenopathy.  Shotty lymph nodes are identified.  Questionable remanent thymic tissues identified.    Dependent atelectatic changes lungs without evidence for consolidation or pulmonary infarct.  No pleural thickening or pleural effusion.  The trachea and airways are patent.    The visualized hollow and solid viscera of the upper abdomen are grossly normal.  Questionable medullary calcinosis is noted.  No suspicious osseous lesions.  Spondylotic changes are identified.                                       CT Head Without Contrast (Final result)  Result time 09/08/22 21:02:23      Final result by Joaquin Devine MD (09/08/22 21:02:23)                   Impression:      No acute intracranial abnormality.      Electronically signed by: Joaquin Devine MD  Date:    09/08/2022  Time:    21:02               Narrative:    EXAMINATION:  CT HEAD WITHOUT CONTRAST    CLINICAL HISTORY:  Headache, chronic, new features or increased frequency;balance difficulties, headaches;    TECHNIQUE:  Low dose axial CT images obtained throughout the head without intravenous contrast. Sagittal and coronal reconstructions were performed.    COMPARISON:  03/04/2019    FINDINGS:  Intracranial compartment:    Ventricles and sulci are normal in size for age without evidence of hydrocephalus. No extra-axial blood or fluid collections.    The brain parenchyma appears normal. No parenchymal mass, hemorrhage, edema or major vascular distribution infarct.    Skull/extracranial contents (limited evaluation): No fracture. Mastoid air cells and paranasal sinuses are essentially clear.                                    Ultrasound of LLE shows no evidence of deep or superficial vein thrombosis        Medications   sodium chloride 0.9% bolus 1,000 mL (0 mLs Intravenous Stopped 9/8/22 1747)   ketorolac injection 30 mg (30 mg Intravenous Given 9/8/22 1647)   morphine injection 4 mg (4 mg Intravenous Given 9/8/22  1827)   acetaminophen tablet 1,000 mg (1,000 mg Oral Given 9/8/22 1923)   labetalol 20 mg/4 mL (5 mg/mL) IV syring (10 mg Intravenous Given 9/8/22 1924)   ketorolac injection 15 mg (15 mg Intravenous Given 9/8/22 2001)   iohexoL (OMNIPAQUE 350) 350 mg iodine/mL injection (100 mLs Intravenous Given 9/8/22 2030)     Medical Decision Making:   Initial Assessment:   Patient currently appears very anxious with concerns of having an acute deep vein thrombosis in left lower extremity.  Will obtain an ultrasound for definitive evaluation.  Due to tachycardia, will also obtain laboratory evaluation.  Clinical Tests:   Lab Tests: Reviewed and Ordered  Radiological Study: Reviewed and Ordered  Medical Tests: Reviewed and Ordered  ED Management:  Patient was initially Dr. Platt who signed out to Sondra who then signed the patient out to me upon conclusion of her shift.  Patient has a history of DVTs.  Was told she might have a DVT of her left calf and to go to the ER since it was mildly swollen.  She told me that the leg has been swollen since she injured it from a fall several weeks ago.  No acute changes since.  Upon sign-out to me we identified that the patient had a negative ultrasound for DVT however was febrile so Tylenol provided.  Fever responded.  Patient did however remain persistently tachycardic.  She denies any chest pain or pressure, no pleuritic component or shortness of breath.  Not hypoxic.  Patient had overall negative labs, negative UA, negative viral panel, etc.  At this time to ensure no acute pulmonary pathology/cause or PE CTA performed and negative. in my interview of the patient she also reported intermittent headaches and some falls recently that contributed to her left leg swelling.  Head scan performed as well that was also negative acute.  On discharge she was informed of all results with  bedside.  Patient had multiple requests including refills on her gabapentin, Lortab and ibuprofen.   She also states she has been struggling with anxiety recently and I did prescribe her several low-dose tablets of Ativan.  She requires close outpatient follow-up and at this time stable for discharge.  Grateful for care. (Holly)           ED Course as of 09/09/22 0116   Thu Sep 08, 2022   1718 Patient transitioned to DAMARIS Stephenson [MW]   1904 Due to persistently abnormal vital signs and continued pain further work-up will be pursued. Care transitioned to Dr. Barrera at 19:00.  [SA]      ED Course User Index  [MW] Zi Méndez MD  [SA] DAMARIS Vila             Clinical Impression:   Final diagnoses:  [M79.89] Left leg swelling  [R50.9] Fever of unknown origin (Primary)  [F41.9] Anxiety  [R20.2] Paresthesias      ED Disposition Condition    Discharge Stable          ED Prescriptions       Medication Sig Dispense Start Date End Date Auth. Provider    LORazepam (ATIVAN) 1 MG tablet Take 0.5 tablets (0.5 mg total) by mouth every 6 (six) hours as needed for Anxiety. 7 tablet 9/8/2022 -- Balta Barrera MD    gabapentin (NEURONTIN) 600 MG tablet Take 1 tablet (600 mg total) by mouth 3 (three) times daily. 90 tablet 9/8/2022 10/8/2022 Balta Barrera MD    ibuprofen (ADVIL,MOTRIN) 800 MG tablet Take 1 tablet (800 mg total) by mouth every 8 (eight) hours as needed. 30 tablet 9/8/2022 -- Balta Barrera MD    HYDROcodone-acetaminophen (NORCO) 5-325 mg per tablet Take 1 tablet by mouth every 6 (six) hours as needed for Pain. 8 tablet 9/8/2022 -- Balta Barrera MD          Follow-up Information       Follow up With Specialties Details Why Contact Info    [facility] - Emergency Dept Emergency Medicine  If symptoms worsen return to ED immediately [address] [facility] [address] [phone]    Primary Care Provider within 1-2 days  Go in 1 day               Balta Barrera MD  09/09/22 1197

## 2022-09-12 ENCOUNTER — PATIENT OUTREACH (OUTPATIENT)
Dept: EMERGENCY MEDICINE | Facility: HOSPITAL | Age: 52
End: 2022-09-12
Payer: MEDICAID

## 2022-11-28 ENCOUNTER — PATIENT OUTREACH (OUTPATIENT)
Dept: EMERGENCY MEDICINE | Facility: HOSPITAL | Age: 52
End: 2022-11-28
Payer: MEDICAID

## 2022-12-07 ENCOUNTER — HOSPITAL ENCOUNTER (EMERGENCY)
Facility: HOSPITAL | Age: 52
Discharge: HOME | End: 2022-12-07
Attending: INTERNAL MEDICINE
Payer: MEDICAID

## 2022-12-07 VITALS
TEMPERATURE: 98 F | HEIGHT: 64 IN | DIASTOLIC BLOOD PRESSURE: 97 MMHG | RESPIRATION RATE: 14 BRPM | BODY MASS INDEX: 33.87 KG/M2 | WEIGHT: 198.38 LBS | OXYGEN SATURATION: 99 % | HEART RATE: 96 BPM | SYSTOLIC BLOOD PRESSURE: 151 MMHG

## 2022-12-07 DIAGNOSIS — R21 SKIN RASH: ICD-10-CM

## 2022-12-07 DIAGNOSIS — M54.40 BACK PAIN OF LUMBAR REGION WITH SCIATICA: Primary | ICD-10-CM

## 2022-12-07 PROCEDURE — 25000003 PHARM REV CODE 250: Performed by: PHYSICIAN ASSISTANT

## 2022-12-07 PROCEDURE — 99284 EMERGENCY DEPT VISIT MOD MDM: CPT

## 2022-12-07 RX ORDER — HYDROCODONE BITARTRATE AND ACETAMINOPHEN 5; 325 MG/1; MG/1
1 TABLET ORAL EVERY 6 HOURS PRN
Qty: 12 TABLET | Refills: 0 | OUTPATIENT
Start: 2022-12-07 | End: 2023-02-07

## 2022-12-07 RX ORDER — HYDROCODONE BITARTRATE AND ACETAMINOPHEN 5; 325 MG/1; MG/1
1 TABLET ORAL
Status: COMPLETED | OUTPATIENT
Start: 2022-12-07 | End: 2022-12-07

## 2022-12-07 RX ORDER — HYDROCODONE BITARTRATE AND ACETAMINOPHEN 5; 325 MG/1; MG/1
1 TABLET ORAL EVERY 6 HOURS PRN
Qty: 8 TABLET | Refills: 0 | Status: SHIPPED | OUTPATIENT
Start: 2022-12-07 | End: 2022-12-07 | Stop reason: CLARIF

## 2022-12-07 RX ORDER — KETOCONAZOLE 20 MG/G
CREAM TOPICAL 2 TIMES DAILY
Qty: 60 G | Refills: 0 | Status: SHIPPED | OUTPATIENT
Start: 2022-12-07

## 2022-12-07 RX ADMIN — HYDROCODONE BITARTRATE AND ACETAMINOPHEN 1 TABLET: 5; 325 TABLET ORAL at 03:12

## 2022-12-07 NOTE — DISCHARGE INSTRUCTIONS
Take medications as prescribed as needed for pain with food and plenty of water.   Apply heating pad to sore muscles ( being careful not to burn skin).  Soak in warm epsom salt baths for 20-30 minutes daily to help with sore and aching muscles.  Avoid lifting heavy.   Follow up with your primary care provider within 1 week.

## 2022-12-07 NOTE — ED PROVIDER NOTES
"Encounter Date: 12/7/2022       History     Chief Complaint   Patient presents with    Back Pain     Lower back pain radiating to left leg x 5 days.  Pt came to ED for same complaint approx 2 months ago and states she was diagnosed with sciatica.       Patient is a 52-year-old female who presents to the emergency department with bilateral lower back pain that radiates into her left leg x5 days.  She states this has been an intermittent problem for months and was diagnosed with sciatica a couple of months ago.  She states the muscle relaxer prescribed during her last ED visit is not working.  She states she has been seeing a chiropractor and doing physical therapy.  She rates the pain at 10/10 and worse with certain positions.  She denies numbness, tingling, weakness, bladder or bowel incontinenc, fever, chills, shortness of breath.    No  was used.   Back Pain   This is a recurrent problem. Illness onset: 5 days. The problem occurs intermittently. The problem has been unchanged. The pain is associated with no known injury. The pain is present in the lumbar spine. The quality of the pain is described as shooting, aching and burning. The pain radiates to the left leg. The pain is at a severity of 10/10. The symptoms are aggravated by certain positions. Associated symptoms include leg pain (left). Pertinent negatives include no chest pain, no fever, no numbness, no abdominal pain, no bowel incontinence, no perianal numbness, no bladder incontinence, no paresthesias, no paresis, no tingling and no weakness. She has tried muscle relaxants for the symptoms. The treatment provided mild relief.   Review of patient's allergies indicates:   Allergen Reactions    Corticosteroids (glucocorticoids) Swelling     Other reaction(s): reports allergic "to all steriods"     Past Medical History:   Diagnosis Date    Abnormal Pap smear of cervix     5 years ago    Diabetes     DVT (deep venous thrombosis) 06/2018    " left leg    Hypertension      Past Surgical History:   Procedure Laterality Date    HYSTERECTOMY      LAPAROSCOPIC CHOLECYSTECTOMY N/A 8/31/2018    Procedure: CHOLECYSTECTOMY, LAPAROSCOPIC;  Surgeon: Aki Watt MD;  Location: [facility];  Service: General;  Laterality: N/A;  No PAT Available-Anes in AM  VIDEO    TONSILLECTOMY, ADENOIDECTOMY  06/2017    TUBAL LIGATION       No family history on file.  Social History     Tobacco Use    Smoking status: Never    Smokeless tobacco: Never   Substance Use Topics    Alcohol use: No    Drug use: No     Review of Systems   Constitutional:  Negative for chills and fever.   Respiratory:  Negative for cough and shortness of breath.    Cardiovascular:  Negative for chest pain and palpitations.   Gastrointestinal:  Negative for abdominal pain, bowel incontinence, nausea and vomiting.   Genitourinary:  Negative for bladder incontinence.   Musculoskeletal:  Positive for back pain.        Pain in left leg   Skin:  Negative for color change, rash and wound.   Neurological:  Negative for tingling, weakness, numbness and paresthesias.   Hematological: Negative.      Physical Exam     Initial Vitals [12/07/22 1147]   BP Pulse Resp Temp SpO2   (!) 144/90 99 18 98.1 °F (36.7 °C) 98 %      MAP       --         Physical Exam    Nursing note and vitals reviewed.  Constitutional: She appears well-developed and well-nourished.   HENT:   Head: Normocephalic and atraumatic.   Nose: Nose normal.   Mouth/Throat: Oropharynx is clear and moist.   Eyes: Conjunctivae are normal.   Neck: Neck supple.   Normal range of motion.  Cardiovascular:  Normal rate, normal heart sounds and intact distal pulses.           Pulmonary/Chest: Breath sounds normal.   Abdominal: Abdomen is soft. Bowel sounds are normal. There is no abdominal tenderness. There is no rebound and no guarding.   Musculoskeletal:         General: Normal range of motion.      Cervical back: Normal range of motion and neck supple. No  bony tenderness.      Thoracic back: No bony tenderness.      Lumbar back: No bony tenderness.        Back:         Legs:      Neurological: She is alert.   Skin: Skin is warm. Capillary refill takes less than 2 seconds.       ED Course   Procedures  Labs Reviewed - No data to display       Imaging Results              X-Ray Lumbar Spine Ap And Lateral (Final result)  Result time 12/07/22 14:19:57      Final result by Loren Carson MD (12/07/22 14:19:57)                   Impression:      Mild degenerative change without appreciable acute osseous abnormality by plain radiographic evaluation.      Electronically signed by: Loren Carson  Date:    12/07/2022  Time:    14:19               Narrative:    EXAMINATION:  XR LUMBAR SPINE AP AND LATERAL    CLINICAL HISTORY:  lower back pain;    TECHNIQUE:  3 views of the lumbar spine were performed.    COMPARISON:  Lumbar radiographs 03/17/2022    FINDINGS:  BONES: Vertebral body heights are maintained. Trace retrolisthesis of L5 on S1, similar to prior.  Lower lumbar facet arthropathy.    DISCS: Disc heights are preserved.   Last fully formed disc space considered L5-S1.  Diminutive rib at L1 on the right.    SOFT TISSUES: Regional soft tissues unremarkable.                                       Medications   HYDROcodone-acetaminophen 5-325 mg per tablet 1 tablet (1 tablet Oral Given 12/7/22 1523)     Medical Decision Making:   Clinical Tests:   Radiological Study: Ordered and Reviewed  ED Management:  The patient is resting comfortably and in no acute distress.  She states that her symptoms have improved after treatment in Emergency Department. I personally discussed her test results and treatment plan.  Gave strict ED precautions, discussed specific conditions for return to the emergency department and importance of follow up with her primary care provider.  Patient voices understanding and agrees to the plan discussed. All patients' questions have been answered  at this time.   She has remained hemodynamically stable throughout entire stay in ED and is stable for discharge home.             ED Course as of 12/07/22 1809   Wed Dec 07, 2022   1441 X-Ray Lumbar Spine Ap And Lateral  Mild degenerative change without appreciable acute osseous abnormality by plain radiographic evaluation. [ER]      ED Course User Index  [ER] DAMARIS Fischer                 Clinical Impression:   Final diagnoses:  [M54.40] Back pain of lumbar region with sciatica (Primary)  [R21] Skin rash        ED Disposition Condition    Discharge Stable          ED Prescriptions       Medication Sig Dispense Start Date End Date Auth. Provider    HYDROcodone-acetaminophen (NORCO) 5-325 mg per tablet  (Status: Discontinued) Take 1 tablet by mouth every 6 (six) hours as needed for Pain. 8 tablet 12/7/2022 12/7/2022 DAMARIS Fischer    ketoconazole (NIZORAL) 2 % cream Apply topically 2 (two) times daily. 60 g 12/7/2022 -- DAMARIS Fischer    HYDROcodone-acetaminophen (NORCO) 5-325 mg per tablet Take 1 tablet by mouth every 6 (six) hours as needed for Pain. 12 tablet 12/7/2022 -- DAMARIS Fischer          Follow-up Information       Follow up With Specialties Details Why Contact Info    [facility] - Emergency Dept Emergency Medicine  As needed, If symptoms worsen [address] [facility] [address] [phone]             DAMARIS Fischer  12/07/22 1809

## 2023-02-07 ENCOUNTER — HOSPITAL ENCOUNTER (EMERGENCY)
Facility: HOSPITAL | Age: 53
Discharge: HOME | End: 2023-02-07
Attending: FAMILY MEDICINE
Payer: MEDICAID

## 2023-02-07 VITALS
WEIGHT: 200.19 LBS | RESPIRATION RATE: 16 BRPM | HEART RATE: 100 BPM | HEIGHT: 64 IN | BODY MASS INDEX: 34.18 KG/M2 | TEMPERATURE: 99 F | OXYGEN SATURATION: 98 % | SYSTOLIC BLOOD PRESSURE: 148 MMHG | DIASTOLIC BLOOD PRESSURE: 88 MMHG

## 2023-02-07 DIAGNOSIS — M54.41 CHRONIC BILATERAL LOW BACK PAIN WITH RIGHT-SIDED SCIATICA: Primary | ICD-10-CM

## 2023-02-07 DIAGNOSIS — G89.29 CHRONIC BILATERAL LOW BACK PAIN WITH RIGHT-SIDED SCIATICA: Primary | ICD-10-CM

## 2023-02-07 PROCEDURE — 99283 EMERGENCY DEPT VISIT LOW MDM: CPT

## 2023-02-07 RX ORDER — ACETAMINOPHEN AND CODEINE PHOSPHATE 300; 30 MG/1; MG/1
1 TABLET ORAL EVERY 6 HOURS PRN
Qty: 12 TABLET | Refills: 0 | Status: SHIPPED | OUTPATIENT
Start: 2023-02-07 | End: 2023-03-18

## 2023-02-07 NOTE — ED PROVIDER NOTES
"Encounter Date: 2/7/2023       History     Chief Complaint   Patient presents with    Back Pain     Lower right back pain x 1 month, shocking pain down the right leg. Worse pain today; has been undergoing therapy.     53 y/o F presents with chronic back pain. Pt reports no recent trauma or falls. Pt denies lower extremity weakness, bowel bladder incontinence, saddle numbness.  Patient reports she drove herself to the ER for further evaluation.  Patient reports her PCP appointment is in 3 weeks and can not wait that long due to pain.  Patient reports she is being treated with physical therapy and chiropractor for her chronic back pain patient has no other complaints.    The history is provided by the patient. No  was used.   Review of patient's allergies indicates:   Allergen Reactions    Corticosteroids (glucocorticoids) Swelling     Other reaction(s): reports allergic "to all steriods"     Past Medical History:   Diagnosis Date    Abnormal Pap smear of cervix     5 years ago    Asthma     Diabetes     DVT (deep venous thrombosis) 06/2018    left leg    Hypertension      Past Surgical History:   Procedure Laterality Date    HYSTERECTOMY      LAPAROSCOPIC CHOLECYSTECTOMY N/A 8/31/2018    Procedure: CHOLECYSTECTOMY, LAPAROSCOPIC;  Surgeon: Aki Watt MD;  Location: [facility];  Service: General;  Laterality: N/A;  No PAT Available-Anes in AM  VIDEO    TONSILLECTOMY, ADENOIDECTOMY  06/2017    TUBAL LIGATION       History reviewed. No pertinent family history.  Social History     Tobacco Use    Smoking status: Never    Smokeless tobacco: Never   Substance Use Topics    Alcohol use: No    Drug use: No     Review of Systems   Constitutional:  Negative for fever.   HENT:  Negative for sore throat.    Respiratory:  Negative for shortness of breath.    Cardiovascular:  Negative for chest pain.   Gastrointestinal:  Negative for nausea and vomiting.   Genitourinary:  Negative for dysuria. "   Musculoskeletal:  Positive for back pain (chronic). Negative for gait problem, joint swelling and myalgias.   Skin:  Negative for rash.   Neurological:  Negative for weakness.   Hematological:  Does not bruise/bleed easily.   All other systems reviewed and are negative.    Physical Exam     Initial Vitals [02/07/23 1125]   BP Pulse Resp Temp SpO2   (!) 148/88 100 16 99 °F (37.2 °C) 98 %      MAP       --         Physical Exam    Nursing note and vitals reviewed.  Constitutional: She appears well-developed and well-nourished. No distress.   HENT:   Head: Normocephalic and atraumatic.   Eyes: Conjunctivae are normal.   Cardiovascular:  Normal heart sounds and intact distal pulses.           Pulmonary/Chest: Breath sounds normal.   Musculoskeletal:         General: Normal range of motion.     Neurological: She is alert and oriented to person, place, and time. Gait normal. GCS score is 15. GCS eye subscore is 4. GCS verbal subscore is 5. GCS motor subscore is 6.   Patient walks with a cane at baseline.  Patient can easily get on and off hospital chair with no difficulty or assistance, uses cane to walk with.  Patient with normal gait.   Skin: Skin is warm and dry. Capillary refill takes less than 2 seconds.   Psychiatric: She has a normal mood and affect. Her behavior is normal. Judgment and thought content normal.       ED Course   Procedures  Labs Reviewed - No data to display       Imaging Results    None          Medications - No data to display  Medical Decision Making:   Discussed x-rays however patient declines at this time.  Various NSAIDs and muscle relaxers offered however patient reports only narcotics help her pain.  Discussed short course of narcotics given her pain today.  Discussed not to drive with medication or drink alcohol.  Side effects include but not limited to dizziness, drowsiness, falls, nausea, vomiting.  Patient verbalized understanding.  Patient is happy with the plan.                         Clinical Impression:   Final diagnoses:  [M54.41, G89.29] Chronic bilateral low back pain with right-sided sciatica (Primary)        ED Disposition Condition    Discharge Stable          ED Prescriptions       Medication Sig Dispense Start Date End Date Auth. Provider    acetaminophen-codeine 300-30mg (TYLENOL #3) 300-30 mg Tab Take 1 tablet by mouth every 6 (six) hours as needed. 12 tablet 2/7/2023 -- Carlos Gonzales MD          Follow-up Information       Follow up With Specialties Details Why Contact Info    [facility] Emergency Dept Emergency Medicine  As needed, If symptoms worsen [address] [facility] [place] [address] [phone]    Follow-up with your PCP in 1 day.  It is your responsibility to call and make an appointment as soon as possible.                 Carlos Gonzales MD  02/07/23 2900

## 2023-03-18 ENCOUNTER — HOSPITAL ENCOUNTER (EMERGENCY)
Facility: HOSPITAL | Age: 53
Discharge: HOME | End: 2023-03-18
Attending: EMERGENCY MEDICINE
Payer: MEDICAID

## 2023-03-18 VITALS
RESPIRATION RATE: 18 BRPM | OXYGEN SATURATION: 98 % | HEART RATE: 98 BPM | DIASTOLIC BLOOD PRESSURE: 84 MMHG | SYSTOLIC BLOOD PRESSURE: 157 MMHG

## 2023-03-18 DIAGNOSIS — R10.13 EPIGASTRIC ABDOMINAL PAIN: Primary | ICD-10-CM

## 2023-03-18 DIAGNOSIS — K59.00 CONSTIPATION: ICD-10-CM

## 2023-03-18 LAB
ALBUMIN SERPL BCP-MCNC: 3.3 G/DL (ref 3.5–5.2)
ALP SERPL-CCNC: 113 U/L (ref 55–135)
ALT SERPL W/O P-5'-P-CCNC: 16 U/L (ref 10–44)
ANION GAP SERPL CALC-SCNC: 9 MMOL/L (ref 8–16)
AST SERPL-CCNC: 13 U/L (ref 10–40)
BASOPHILS # BLD AUTO: 0.03 K/UL (ref 0–0.2)
BASOPHILS NFR BLD: 0.6 % (ref 0–1.9)
BILIRUB SERPL-MCNC: 0.4 MG/DL (ref 0.1–1)
BUN SERPL-MCNC: 20 MG/DL (ref 6–20)
CALCIUM SERPL-MCNC: 9 MG/DL (ref 8.7–10.5)
CHLORIDE SERPL-SCNC: 105 MMOL/L (ref 95–110)
CO2 SERPL-SCNC: 25 MMOL/L (ref 23–29)
CREAT SERPL-MCNC: 1.4 MG/DL (ref 0.5–1.4)
DIFFERENTIAL METHOD: NORMAL
EOSINOPHIL # BLD AUTO: 0.1 K/UL (ref 0–0.5)
EOSINOPHIL NFR BLD: 1.4 % (ref 0–8)
ERYTHROCYTE [DISTWIDTH] IN BLOOD BY AUTOMATED COUNT: 13.1 % (ref 11.5–14.5)
EST. GFR  (NO RACE VARIABLE): 45 ML/MIN/1.73 M^2
GLUCOSE SERPL-MCNC: 376 MG/DL (ref 70–110)
HCT VFR BLD AUTO: 40.5 % (ref 37–48.5)
HGB BLD-MCNC: 13.2 G/DL (ref 12–16)
IMM GRANULOCYTES # BLD AUTO: 0.01 K/UL (ref 0–0.04)
IMM GRANULOCYTES NFR BLD AUTO: 0.2 % (ref 0–0.5)
LYMPHOCYTES # BLD AUTO: 1.9 K/UL (ref 1–4.8)
LYMPHOCYTES NFR BLD: 37.8 % (ref 18–48)
MAGNESIUM SERPL-MCNC: 1.6 MG/DL (ref 1.6–2.6)
MCH RBC QN AUTO: 27.7 PG (ref 27–31)
MCHC RBC AUTO-ENTMCNC: 32.6 G/DL (ref 32–36)
MCV RBC AUTO: 85 FL (ref 82–98)
MONOCYTES # BLD AUTO: 0.4 K/UL (ref 0.3–1)
MONOCYTES NFR BLD: 8.5 % (ref 4–15)
NEUTROPHILS # BLD AUTO: 2.6 K/UL (ref 1.8–7.7)
NEUTROPHILS NFR BLD: 51.5 % (ref 38–73)
NRBC BLD-RTO: 0 /100 WBC
PLATELET # BLD AUTO: 243 K/UL (ref 150–450)
PMV BLD AUTO: 11 FL (ref 9.2–12.9)
POCT GLUCOSE: 326 MG/DL (ref 70–110)
POTASSIUM SERPL-SCNC: 4 MMOL/L (ref 3.5–5.1)
PROT SERPL-MCNC: 6.9 G/DL (ref 6–8.4)
RBC # BLD AUTO: 4.77 M/UL (ref 4–5.4)
SODIUM SERPL-SCNC: 139 MMOL/L (ref 136–145)
WBC # BLD AUTO: 5.05 K/UL (ref 3.9–12.7)

## 2023-03-18 PROCEDURE — 80053 COMPREHEN METABOLIC PANEL: CPT | Performed by: EMERGENCY MEDICINE

## 2023-03-18 PROCEDURE — 85025 COMPLETE CBC W/AUTO DIFF WBC: CPT | Performed by: EMERGENCY MEDICINE

## 2023-03-18 PROCEDURE — 99284 EMERGENCY DEPT VISIT MOD MDM: CPT

## 2023-03-18 PROCEDURE — 82962 GLUCOSE BLOOD TEST: CPT

## 2023-03-18 PROCEDURE — 83735 ASSAY OF MAGNESIUM: CPT | Performed by: EMERGENCY MEDICINE

## 2023-03-18 PROCEDURE — 25000003 PHARM REV CODE 250: Performed by: EMERGENCY MEDICINE

## 2023-03-18 RX ORDER — INSULIN GLARGINE 100 [IU]/ML
20 INJECTION, SOLUTION SUBCUTANEOUS NIGHTLY
COMMUNITY
Start: 2023-02-20

## 2023-03-18 RX ORDER — HYDROCODONE BITARTRATE AND ACETAMINOPHEN 10; 325 MG/1; MG/1
1 TABLET ORAL DAILY PRN
COMMUNITY
Start: 2023-02-20 | End: 2023-03-18

## 2023-03-18 RX ORDER — LIDOCAINE HYDROCHLORIDE 20 MG/ML
15 SOLUTION OROPHARYNGEAL ONCE
Status: COMPLETED | OUTPATIENT
Start: 2023-03-18 | End: 2023-03-18

## 2023-03-18 RX ORDER — HYDROCODONE BITARTRATE AND ACETAMINOPHEN 7.5; 325 MG/1; MG/1
1 TABLET ORAL
COMMUNITY
Start: 2022-10-11 | End: 2023-03-18

## 2023-03-18 RX ORDER — METFORMIN HYDROCHLORIDE 1000 MG/1
1000 TABLET ORAL 2 TIMES DAILY WITH MEALS
COMMUNITY

## 2023-03-18 RX ORDER — ALBUTEROL SULFATE 90 UG/1
AEROSOL, METERED RESPIRATORY (INHALATION)
COMMUNITY

## 2023-03-18 RX ORDER — ATORVASTATIN CALCIUM 80 MG/1
80 TABLET, FILM COATED ORAL NIGHTLY
COMMUNITY

## 2023-03-18 RX ORDER — IBUPROFEN 600 MG/1
600 TABLET ORAL EVERY 8 HOURS PRN
COMMUNITY
Start: 2022-11-25 | End: 2023-03-18

## 2023-03-18 RX ORDER — MAG HYDROX/ALUMINUM HYD/SIMETH 200-200-20
30 SUSPENSION, ORAL (FINAL DOSE FORM) ORAL ONCE
Status: COMPLETED | OUTPATIENT
Start: 2023-03-18 | End: 2023-03-18

## 2023-03-18 RX ORDER — LORAZEPAM 0.5 MG/1
0.5 TABLET ORAL 2 TIMES DAILY
COMMUNITY
Start: 2022-10-11

## 2023-03-18 RX ORDER — FAMOTIDINE 20 MG/1
20 TABLET, FILM COATED ORAL 2 TIMES DAILY
Qty: 20 TABLET | Refills: 0 | Status: SHIPPED | OUTPATIENT
Start: 2023-03-18 | End: 2024-03-17

## 2023-03-18 RX ORDER — SODIUM CHLORIDE 9 MG/ML
500 INJECTION, SOLUTION INTRAVENOUS
Status: COMPLETED | OUTPATIENT
Start: 2023-03-18 | End: 2023-03-18

## 2023-03-18 RX ORDER — CYCLOBENZAPRINE HCL 10 MG
10 TABLET ORAL 2 TIMES DAILY PRN
COMMUNITY

## 2023-03-18 RX ORDER — CALC/MAG/B COMPLEX/D3/HERB 61
1 TABLET ORAL
COMMUNITY
End: 2023-03-18

## 2023-03-18 RX ORDER — GLIPIZIDE 10 MG/1
10 TABLET ORAL DAILY
COMMUNITY

## 2023-03-18 RX ORDER — LISINOPRIL 10 MG/1
10 TABLET ORAL DAILY
COMMUNITY

## 2023-03-18 RX ORDER — LORAZEPAM 0.5 MG/1
TABLET ORAL
COMMUNITY
Start: 2022-10-11 | End: 2023-03-18

## 2023-03-18 RX ORDER — GABAPENTIN 600 MG/1
1 TABLET ORAL 3 TIMES DAILY
COMMUNITY
End: 2023-03-18 | Stop reason: SDUPTHER

## 2023-03-18 RX ADMIN — LIDOCAINE HYDROCHLORIDE 15 ML: 20 SOLUTION ORAL; TOPICAL at 01:03

## 2023-03-18 RX ADMIN — SODIUM CHLORIDE 500 ML: 9 INJECTION, SOLUTION INTRAVENOUS at 01:03

## 2023-03-18 RX ADMIN — ALUMINUM HYDROXIDE, MAGNESIUM HYDROXIDE, AND SIMETHICONE 30 ML: 200; 200; 20 SUSPENSION ORAL at 01:03

## 2023-03-18 NOTE — PHARMACY MED REC
"  Admission Medication History     The home medication history was taken by Yudy Francis CPhT.    Medication history obtained from, Patient Verified    You may go to "Admission" then "Reconcile Home Medications" tabs to review and/or act upon these items.     The home medication list has been updated by the Pharmacy department.   Please read ALL comments highlighted in yellow.   Please address this information as you see fit.    Feel free to contact us if you have any questions or require assistance.      The medications listed below were removed from the home medication list.  Please reorder if appropriate:  Patient reports no longer taking the following medication(s):  Acetaminophen 300-30 mg  Benzonatate 100 mg  Dexamethasone 6 mg  Norco 7.5-325 and  mg  Ibuprofen 600 mg and 800 mg  Indomethacin 50 mg  Linzess 290 mcg  Methocarbamol 500 mg  Protonix 20 mg  Xarelto 20         Yudy Francis CPhT.  Ext [phone]             .        "

## 2023-03-18 NOTE — ED NOTES
"Patient presents to ED from home with complaint of 10/10 upper middle abdominal pain. Patient states she has a history of hernia and saw a specialist 1 year ago that told her the hernia was small and did not require surgical intervention. Patient states she has had on and off abdominal pain since the visit. Patient reports she has not had a bowel movement in several days. Patient states she is a diabetic and takes insulin. Reports having excessive thirst and urinary frequency. Patient denies chest pain, but reports feeling SOB from abd pain; RR 97% on RA. States she last checked her blood sugar yesterday and reports it was "in the 300s). Patient is awake, alert, and ambulatory.   "

## 2023-03-18 NOTE — ED NOTES
Patient states she is feeling much better after receiving GI cocktail. Discharge instructions and prescriptions reviewed with patient. Patient verbalizes understanding.

## 2023-03-18 NOTE — ED PROVIDER NOTES
"Encounter Date: 3/18/2023    SCRIBE #1 NOTE: I, Nadine Castillo, am scribing for, and in the presence of,  Maykel Olsen MD. I have scribed the following portions of the note - Other sections scribed: HPI, OREN.     History     Chief Complaint   Patient presents with    Abdominal Pain     Patient presents to ED from home with complaint of 10/10 abdominal pain to her upper middle abdomen. Patient states she saw a hernia specialist one year ago that but states she has had problems ever since. Patient states she is diabetic and has had excessive thirst and urinary frequency. Patient also c/o SOB from abdominal pain, denies chest pain.      Lorelei Norris is a 53 y.o. female who  has a past medical history of Abnormal Pap smear of cervix, Asthma, Diabetes, DVT (deep venous thrombosis) (06/2018), and Hypertension.    The patient presents to the ED due to chronic abdominal pain that began one year ago. She states she was diagnosed with an umbilical hernia by a surgeon one year ago. However, the surgeon told her the hernia was too small for surgery. She reports having persistent pain that occurs every morning. Her associated symptoms include nausea and constipation. Her last bowel movement was a few days ago. She shares she has been evaluated by several GI doctors to treat her symptoms. The most recent one was three weeks ago at [facility] where she was given pain medications to manage her symptoms.     The history is provided by the patient. No  was used.   Review of patient's allergies indicates:   Allergen Reactions    Corticosteroids (glucocorticoids) Swelling     Other reaction(s): reports allergic "to all steriods"     Past Medical History:   Diagnosis Date    Abnormal Pap smear of cervix     5 years ago    Asthma     Diabetes     DVT (deep venous thrombosis) 06/2018    left leg    Hypertension      Past Surgical History:   Procedure Laterality Date    HYSTERECTOMY      LAPAROSCOPIC " CHOLECYSTECTOMY N/A 8/31/2018    Procedure: CHOLECYSTECTOMY, LAPAROSCOPIC;  Surgeon: Aki Watt MD;  Location: [facility] OR;  Service: General;  Laterality: N/A;  No PAT Available-Anes in AM  VIDEO    TONSILLECTOMY, ADENOIDECTOMY  06/2017    TUBAL LIGATION       No family history on file.  Social History     Tobacco Use    Smoking status: Never    Smokeless tobacco: Never   Substance Use Topics    Alcohol use: No    Drug use: No     Review of Systems   Constitutional:  Negative for fever.   HENT:  Negative for sore throat.    Respiratory:  Negative for shortness of breath.    Cardiovascular:  Negative for chest pain.   Gastrointestinal:  Positive for abdominal pain, constipation and nausea.   Genitourinary:  Negative for dysuria.   Musculoskeletal:  Negative for back pain.   Skin:  Negative for rash.   Neurological:  Negative for weakness.   Hematological:  Does not bruise/bleed easily.     Physical Exam     Initial Vitals [03/18/23 1236]   BP Pulse Resp Temp SpO2   (!) (P) 142/78 (P) 95 (P) 18 (P) 98.4 °F (36.9 °C) (P) 96 %      MAP       --         Physical Exam    Nursing note and vitals reviewed.  HENT:   Head: Atraumatic.   Eyes: Conjunctivae and EOM are normal.   Neck:   Normal range of motion.  Cardiovascular:      Exam reveals no gallop and no friction rub.       No murmur heard.  Pulmonary/Chest: Breath sounds normal. No respiratory distress.   Abdominal: Abdomen is soft. She exhibits no distension and no mass. There is no abdominal tenderness (No focal tenderness).   Musculoskeletal:         General: No edema. Normal range of motion.      Cervical back: Normal range of motion.     Neurological: She is alert and oriented to person, place, and time.   Psychiatric: She has a normal mood and affect.       ED Course   Procedures  Labs Reviewed   COMPREHENSIVE METABOLIC PANEL - Abnormal; Notable for the following components:       Result Value    Glucose 376 (*)     Albumin 3.3 (*)     eGFR 45 (*)     All  other components within normal limits   POCT GLUCOSE - Abnormal; Notable for the following components:    POCT Glucose 326 (*)     All other components within normal limits   MAGNESIUM   CBC W/ AUTO DIFFERENTIAL          Imaging Results              X-Ray Abdomen Flat And Erect (Final result)  Result time 03/18/23 13:31:33      Final result by Jadiel Arango MD (03/18/23 13:31:33)                   Impression:      1. Nonobstructive bowel gas pattern.  2. Findings suggesting bilateral nephrocalcinosis, please see CT 02/05/2022      Electronically signed by: Jadiel Arango MD  Date:    03/18/2023  Time:    13:31               Narrative:    EXAMINATION:  XR ABDOMEN FLAT AND ERECT    CLINICAL HISTORY:  Constipation, unspecified    TECHNIQUE:  Flat and erect AP views of the abdomen were performed.    COMPARISON:  05/15/2021, CT abdomen and pelvis 02/05/2022    FINDINGS:  One upright view, 1 supine view.    No significant air-fluid levels on upright view.  Air and stool is seen within the large bowel and projected over the rectum.  No focally dilated small bowel loops.  Surgical change projects over the right upper quadrant.  There are calcific densities projected in the region of the bilateral renal collecting systems suggesting multifocal renal calculi/nephrocalcinosis.  No definite calcification along the courses of the ureters although technique is limited for evaluation of the same.  No large volume free air or pneumatosis.  Surgical change projects over the pelvis.  The osseous structures are intact.                                       Medications   0.9%  NaCl infusion (500 mLs Intravenous New Bag 3/18/23 1335)   aluminum-magnesium hydroxide-simethicone 200-200-20 mg/5 mL suspension 30 mL (30 mLs Oral Given 3/18/23 1334)     And   LIDOcaine HCl 2% oral solution 15 mL (15 mLs Oral Given 3/18/23 1334)     Medical Decision Making:   Initial Assessment:   53-year-old female presenting with acute on chronic  abdominal pain.  On exam she has no focal tenderness.  No peritoneal signs.  On review of records, patient had an umbilical fat containing hernia on CT done in February 2022 however the patient states her pain is in the epigastric region.  Vital signs unremarkable.  Flat and erect film shows no evidence of obstruction.  Labs show hyperglycemia without evidence of DKA.  At this time I do not think this patient has an acute life-threatening emergency given the chronicity of her symptoms.  Vital signs remained stable.  Will refer her to General surgery once again for further evaluation of her abdominal pain and hernia.        Scribe Attestation:   Scribe #1: I performed the above scribed service and the documentation accurately describes the services I performed. I attest to the accuracy of the note.      ED Course as of 03/18/23 1417   Sat Mar 18, 2023   1338 X-Ray Abdomen Flat And Erect  Nonobstructive gas pattern [SN]      ED Course User Index  [SN] Maykel Olsen MD               Physician Attestation for Scribe: I, Maykel Olsen, reviewed documentation as scribed in my presence, which is both accurate and complete.   Clinical Impression:   Final diagnoses:  [K59.00] Constipation  [R10.13] Epigastric abdominal pain (Primary)        ED Disposition Condition    Discharge Stable          ED Prescriptions       Medication Sig Dispense Start Date End Date Auth. Provider    famotidine (PEPCID) 20 MG tablet Take 1 tablet (20 mg total) by mouth 2 (two) times daily. 20 tablet 3/18/2023 3/17/2024 Maykel Olsen MD          Follow-up Information       Follow up With Specialties Details Why Contact Info    General Surgery  Schedule an appointment as soon as possible for a visit in 1 week               Maykel Olsen MD  03/18/23 0471

## 2023-06-29 ENCOUNTER — HOSPITAL ENCOUNTER (EMERGENCY)
Facility: HOSPITAL | Age: 53
Discharge: HOME | End: 2023-06-29
Attending: STUDENT IN AN ORGANIZED HEALTH CARE EDUCATION/TRAINING PROGRAM
Payer: MEDICAID

## 2023-06-29 VITALS
RESPIRATION RATE: 18 BRPM | DIASTOLIC BLOOD PRESSURE: 82 MMHG | SYSTOLIC BLOOD PRESSURE: 143 MMHG | TEMPERATURE: 98 F | HEART RATE: 102 BPM | BODY MASS INDEX: 34.33 KG/M2 | OXYGEN SATURATION: 100 % | WEIGHT: 200 LBS

## 2023-06-29 DIAGNOSIS — I82.401 LEG DVT (DEEP VENOUS THROMBOEMBOLISM), ACUTE, RIGHT: Primary | ICD-10-CM

## 2023-06-29 DIAGNOSIS — M79.662 BILATERAL CALF PAIN: ICD-10-CM

## 2023-06-29 DIAGNOSIS — M79.661 BILATERAL CALF PAIN: ICD-10-CM

## 2023-06-29 DIAGNOSIS — M54.31 SCIATICA OF RIGHT SIDE: ICD-10-CM

## 2023-06-29 PROCEDURE — 99284 EMERGENCY DEPT VISIT MOD MDM: CPT | Mod: 25

## 2023-06-29 PROCEDURE — 25000003 PHARM REV CODE 250: Performed by: PHYSICIAN ASSISTANT

## 2023-06-29 RX ORDER — HYDROCODONE BITARTRATE AND ACETAMINOPHEN 7.5; 325 MG/1; MG/1
1 TABLET ORAL ONCE
Status: COMPLETED | OUTPATIENT
Start: 2023-06-29 | End: 2023-06-29

## 2023-06-29 RX ORDER — HYDROCODONE BITARTRATE AND ACETAMINOPHEN 7.5; 325 MG/1; MG/1
1 TABLET ORAL EVERY 6 HOURS PRN
Qty: 10 TABLET | Refills: 0 | Status: SHIPPED | OUTPATIENT
Start: 2023-06-29 | End: 2023-07-04

## 2023-06-29 RX ADMIN — HYDROCODONE BITARTRATE AND ACETAMINOPHEN 1 TABLET: 7.5; 325 TABLET ORAL at 07:06

## 2023-06-29 NOTE — ED PROVIDER NOTES
"Encounter Date: 6/29/2023       History     Chief Complaint   Patient presents with    Leg Pain     Pt reports right sided sciatica pain      52 yo F w/ PMHx significant for DVT, sciatica, HTN, DM & asthma presents to ED c/o R sided low back pain that radiates down posterior R leg. Denies any recent falls or other injury. Also reports b/l calf pain & swelling. Patient no longer taking blood thinners. Denies CP, SOB, cough, hemoptysis, F/C, saddle anesthesia, incontinence, bowel/bladder retention, inability to bear weight/ambulate. VSS on arrival, patient in NAD.    Review of patient's allergies indicates:   Allergen Reactions    Corticosteroids (glucocorticoids) Swelling     Other reaction(s): reports allergic "to all steriods"     Past Medical History:   Diagnosis Date    Abnormal Pap smear of cervix     5 years ago    Asthma     Diabetes     DVT (deep venous thrombosis) 06/2018    left leg    Hypertension      Past Surgical History:   Procedure Laterality Date    HYSTERECTOMY      LAPAROSCOPIC CHOLECYSTECTOMY N/A 8/31/2018    Procedure: CHOLECYSTECTOMY, LAPAROSCOPIC;  Surgeon: Aki Watt MD;  Location: [facility];  Service: General;  Laterality: N/A;  No PAT Available-Anes in AM  VIDEO    TONSILLECTOMY, ADENOIDECTOMY  06/2017    TUBAL LIGATION       No family history on file.  Social History     Tobacco Use    Smoking status: Never    Smokeless tobacco: Never   Substance Use Topics    Alcohol use: No    Drug use: No     Review of Systems   All other systems reviewed and are negative.    Physical Exam     Initial Vitals [06/29/23 1838]   BP Pulse Resp Temp SpO2   (!) 143/82 102 18 98.2 °F (36.8 °C) 100 %      MAP       --         Physical Exam    Nursing note and vitals reviewed.  Constitutional: She appears well-developed and well-nourished. She is not diaphoretic. No distress.   HENT:   Head: Normocephalic and atraumatic.   Mouth/Throat: Oropharynx is clear and moist.   Eyes: Conjunctivae and EOM are " normal. Pupils are equal, round, and reactive to light.   Neck: Neck supple.   Normal range of motion.  Cardiovascular:  Normal rate, regular rhythm, normal heart sounds and intact distal pulses.     Exam reveals no gallop and no friction rub.       No murmur heard.  Pulmonary/Chest: Breath sounds normal. No respiratory distress. She has no wheezes. She has no rhonchi. She has no rales.   Abdominal: Abdomen is soft. Bowel sounds are normal. She exhibits no distension. There is no abdominal tenderness. There is no rebound and no guarding.   Musculoskeletal:         General: No edema.      Cervical back: Normal, normal range of motion and neck supple.      Thoracic back: Normal.      Lumbar back: Tenderness (b/l paraspinal muscles) present. No swelling, edema, deformity, signs of trauma, spasms or bony tenderness. Positive right straight leg raise test. Negative left straight leg raise test.      Right lower leg: Swelling and tenderness (calf) present. No edema.      Left lower leg: Swelling and tenderness (calf) present. No edema.      Comments: Rayne's sign positive b/l.      Neurological: She is alert and oriented to person, place, and time. She has normal strength and normal reflexes. No sensory deficit.   Skin: Skin is warm and dry. Capillary refill takes less than 2 seconds. No rash noted.   Psychiatric: She has a normal mood and affect. Thought content normal.       ED Course   Procedures  Labs Reviewed - No data to display       Imaging Results    None          Medications   HYDROcodone-acetaminophen 7.5-325 mg per tablet 1 tablet (1 tablet Oral Given 6/29/23 1902)     Medical Decision Making:   Clinical Tests:   Lab Tests: Reviewed  Radiological Study: Ordered and Reviewed  ED Management:  Conclusion       · The right peroneal vein is abnormal.  · There is no evidence of a left lower extremity DVT.     The right lower extremity is positive for acute, occlusive deep vein thrombosis in both peroneal veins at  the upper calf. Details below.     Charbel OCAMPO informed of findings at the time of the scan.  ____________________  US reveals acute DVT of RLE. Patient denies SOB, hemoptysis or CP & has SpO2 of 100%; low suspicion for PE. Will start patient on eliquis. Physical exam unremarkable for spinal cord compression. Will discharge w/ meds for pain control. Patient reports she is not currently taking a benzo, but instructed if she starts back not to take norco w/in 8 hours of taking benzos & vice versa. Instructed to contact PCP tomorrow for close follow-up. Strict ED precautions given for new or worsening symptoms & patient verbalized understanding.     APC / Resident Notes:   I was not physically present during the history or exam of this patient.  I was available at all times for consultation. (Holly)                    Clinical Impression:   Final diagnoses:  [M79.661, M79.662] Bilateral calf pain  [M54.31] Sciatica of right side  [I82.401] Leg DVT (deep venous thromboembolism), acute, right (Primary)        ED Disposition Condition    Discharge Good          ED Prescriptions       Medication Sig Dispense Start Date End Date Auth. Provider    rivaroxaban (XARELTO) 15 mg Tab Take 1 tablet (15 mg total) by mouth 2 (two) times daily with meals. for 21 days 42 tablet 6/29/2023 7/20/2023 DAMARIS Sahin    rivaroxaban (XARELTO) 20 mg Tab Take 1 tablet (20 mg total) by mouth daily with dinner or evening meal. Do not start taking this until you have completed full 21 day course of 15 mg tablets twice daily. 30 tablet 6/29/2023 7/29/2023 DAMARIS Sahni    HYDROcodone-acetaminophen (NORCO) 7.5-325 mg per tablet Take 1 tablet by mouth every 6 (six) hours as needed for Pain. 10 tablet 6/29/2023 7/4/2023 DAMARIS Sahni          Follow-up Information    None          DAMARIS Sahni  06/29/23 2108       Balta Barrera MD  06/29/23 0942

## 2023-06-30 ENCOUNTER — TELEPHONE (OUTPATIENT)
Dept: EMERGENCY MEDICINE | Facility: HOSPITAL | Age: 53
End: 2023-06-30
Payer: MEDICAID

## 2023-06-30 NOTE — TELEPHONE ENCOUNTER
Attempted to contact patients PCP Dr. Jadiel Luciano at [facility] for follow up appointment, closed today will re-open on Monday.  Patient contacted, states her leg is still hurting and swollen informed I was unable to contact her PCP today however, his office will be open on Monday stressed to the point to call for appointment.  Verified patient picked up new medication Xarelto.   
Statement Selected

## 2023-09-11 DIAGNOSIS — I82.409 RECURRENT DEEP VEIN THROMBOSIS (DVT): Primary | ICD-10-CM

## 2023-12-03 ENCOUNTER — HOSPITAL ENCOUNTER (EMERGENCY)
Facility: HOSPITAL | Age: 53
Discharge: HOME | End: 2023-12-03
Attending: EMERGENCY MEDICINE
Payer: MEDICAID

## 2023-12-03 VITALS
RESPIRATION RATE: 18 BRPM | BODY MASS INDEX: 32.27 KG/M2 | HEIGHT: 64 IN | TEMPERATURE: 99 F | HEART RATE: 89 BPM | DIASTOLIC BLOOD PRESSURE: 88 MMHG | WEIGHT: 189 LBS | SYSTOLIC BLOOD PRESSURE: 143 MMHG | OXYGEN SATURATION: 99 %

## 2023-12-03 DIAGNOSIS — M25.559 HIP PAIN: ICD-10-CM

## 2023-12-03 DIAGNOSIS — M54.31 SCIATICA OF RIGHT SIDE: Primary | ICD-10-CM

## 2023-12-03 PROCEDURE — 25000003 PHARM REV CODE 250: Performed by: EMERGENCY MEDICINE

## 2023-12-03 PROCEDURE — 63600175 PHARM REV CODE 636 W HCPCS: Performed by: EMERGENCY MEDICINE

## 2023-12-03 PROCEDURE — 96372 THER/PROPH/DIAG INJ SC/IM: CPT | Performed by: EMERGENCY MEDICINE

## 2023-12-03 PROCEDURE — 99284 EMERGENCY DEPT VISIT MOD MDM: CPT

## 2023-12-03 RX ORDER — HYDROCODONE BITARTRATE AND ACETAMINOPHEN 10; 325 MG/1; MG/1
1 TABLET ORAL
Status: COMPLETED | OUTPATIENT
Start: 2023-12-03 | End: 2023-12-03

## 2023-12-03 RX ORDER — DEXAMETHASONE SODIUM PHOSPHATE 4 MG/ML
8 INJECTION, SOLUTION INTRA-ARTICULAR; INTRALESIONAL; INTRAMUSCULAR; INTRAVENOUS; SOFT TISSUE
Status: COMPLETED | OUTPATIENT
Start: 2023-12-03 | End: 2023-12-03

## 2023-12-03 RX ORDER — IBUPROFEN 800 MG/1
800 TABLET ORAL EVERY 8 HOURS PRN
Qty: 20 TABLET | Refills: 0 | Status: SHIPPED | OUTPATIENT
Start: 2023-12-03

## 2023-12-03 RX ORDER — IBUPROFEN 800 MG/1
800 TABLET ORAL EVERY 8 HOURS PRN
Qty: 20 TABLET | Refills: 0 | Status: SHIPPED | OUTPATIENT
Start: 2023-12-03 | End: 2023-12-03 | Stop reason: SDUPTHER

## 2023-12-03 RX ADMIN — DEXAMETHASONE SODIUM PHOSPHATE 8 MG: 4 INJECTION, SOLUTION INTRA-ARTICULAR; INTRALESIONAL; INTRAMUSCULAR; INTRAVENOUS; SOFT TISSUE at 03:12

## 2023-12-03 RX ADMIN — HYDROCODONE BITARTRATE AND ACETAMINOPHEN 1 TABLET: 10; 325 TABLET ORAL at 03:12

## 2023-12-03 NOTE — ED PROVIDER NOTES
"ED PROVIDER NOTE  12/3/2023    CHIEF COMPLAINT:   Chief Complaint   Patient presents with    Hip Pain     Pt presents to ed with reports of R sided Hip pain x1 week. Pt states Hx of sciatica but has been out of pain meds.        HISTORY OF PRESENT ILLNESS:   Lorelei Norris is a 53 y.o. female who presents with chief complaint Hip pain. Onset was about a week ago with pain in her right hip that radiates down the back of her leg feels like tightness in her back and buttocks that she states feels like her sciatica that she was had in the past.  Does report that she is had some falls because of her hip locking up and has some groin pain as well.  She states that normally they give her narcotic medicine and she is had Decadron before and it seemed to help.    The history is provided by the patient.         REVIEW OF SYSTEMS: as noted in the HPI.  NURSING NOTES REVIEWED      PAST MEDICAL/SURGICAL HISTORY:   Past Medical History:   Diagnosis Date    Abnormal Pap smear of cervix     5 years ago    Asthma     Diabetes     DVT (deep venous thrombosis) 06/2018    left leg    Hypertension       Past Surgical History:   Procedure Laterality Date    HYSTERECTOMY      LAPAROSCOPIC CHOLECYSTECTOMY N/A 8/31/2018    Procedure: CHOLECYSTECTOMY, LAPAROSCOPIC;  Surgeon: Aki Watt MD;  Location: [facility];  Service: General;  Laterality: N/A;  No PAT Available-Anes in AM  VIDEO    TONSILLECTOMY, ADENOIDECTOMY  06/2017    TUBAL LIGATION         FAMILY HISTORY: History reviewed. No pertinent family history.    SOCIAL HISTORY:   Social History     Tobacco Use    Smoking status: Never    Smokeless tobacco: Never   Substance Use Topics    Alcohol use: No    Drug use: No       ALLERGIES:   Review of patient's allergies indicates:   Allergen Reactions    Corticosteroids (glucocorticoids) Swelling     Other reaction(s): reports allergic "to all steriods"       PHYSICAL EXAM:  Initial Vitals [12/03/23 0201]   BP Pulse Resp Temp SpO2 "   (!) 159/95 93 18 98.8 °F (37.1 °C) 99 %      MAP       --         Physical Exam    Nursing note and vitals reviewed.  Constitutional: She appears well-developed and well-nourished.   HENT:   Head: Normocephalic and atraumatic.   Mouth/Throat: Uvula is midline and mucous membranes are normal.   Eyes: EOM are normal. Pupils are equal, round, and reactive to light.   Neck: Trachea normal. Neck supple.   Cardiovascular:  Normal rate, regular rhythm and normal pulses.           Pulmonary/Chest: Effort normal and breath sounds normal.   Abdominal: Abdomen is soft. Bowel sounds are normal. There is no abdominal tenderness. There is no rebound and no guarding.   Musculoskeletal:      Cervical back: Neck supple. No bony tenderness.      Thoracic back: No bony tenderness.      Lumbar back: No bony tenderness.      Comments: Tenderness to palpation right buttock no obvious deformities.     Neurological: She is alert and oriented to person, place, and time. GCS eye subscore is 4. GCS verbal subscore is 5. GCS motor subscore is 6.   Skin: Skin is warm and dry.   Psychiatric: She has a normal mood and affect. Her speech is normal. Thought content normal.         RESULTS:  Labs Reviewed - No data to display  Imaging Results              X-Ray Hip 2 or 3 views Right (with Pelvis when performed) (Final result)  Result time 12/03/23 10:18:59      Final result by John Moreno MD (12/03/23 10:18:59)                   Impression:      Degenerative arthritic changes..      Electronically signed by: John Moreno  Date:    12/03/2023  Time:    10:18               Narrative:    EXAMINATION:  XR HIP WITH PELVIS WHEN PERFORMED, 2 OR 3  VIEWS RIGHT    CLINICAL HISTORY:  Hip pain.    COMPARISON:  None available.    FINDINGS:  There are bilateral hips moderate degenerative arthritic changes with narrowing of the joint space and subchondral sclerosis.  There are also mild degenerative arthritic changes involving the sacroiliac joints.  No  acute fracture or dislocation.                        Wet Read by Alvarado Mendes DO (12/03/23 03:03:51, [facility], Emergency Medicine)    No displaced fracture or dislocation.                                    PROCEDURES:  Procedures    ECG:       ED COURSE AND MEDICAL DECISION MAKING:  Medications   HYDROcodone-acetaminophen  mg per tablet 1 tablet (1 tablet Oral Given 12/3/23 0319)   dexAMETHasone injection 8 mg (8 mg Intramuscular Given 12/3/23 0319)           Medical Decision Making  53-year-old female who presents with right hip pain having pain in her buttocks radiating down the back of her leg that she states feels like her sciatica for which she normally takes hydrocodone but states she is been out of this medication.  X-ray of the right hip shows no acute bony abnormality.  Given dose of Decadron and hydrocodone and we will discharge with ibuprofen.  Instructed to follow up with her PCP.  Given strict ED return precautions. I have spoken with the patient and/or caregivers. I have explained the patient's condition, diagnoses and treatment plan based on the information available to me at this time. I have answered the patient's and/or caregiver's questions and addressed any concerns. The patient and/or caregivers have as good an understanding of the patient's diagnosis, condition and treatment plan as can be expected at this point. The vital signs have been stable. The patient's condition is stable and appropriate for discharge from the emergency department.     The patient will pursue further outpatient evaluation with the primary care physician or other designated or consulting physician as outlined in the discharge instructions. The patient and/or caregivers are agreeable to this plan of care and follow-up instructions have been explained in detail. The patient and/or caregivers have received these instructions in written format and have expressed an understanding of the  discharge instructions. The patient and/or caregivers are aware that any significant change in condition or worsening of symptoms should prompt an immediate return to this or the closest emergency department or a call to 911.    Amount and/or Complexity of Data Reviewed  External Data Reviewed: radiology and notes.  Radiology: ordered and independent interpretation performed.    Risk  Prescription drug management.        CLINICAL IMPRESSION:  1. Sciatica of right side    2. Hip pain        DISPOSITION:   ED Disposition Condition    Discharge Stable            ED Prescriptions       Medication Sig Dispense Start Date End Date Auth. Provider    ibuprofen (ADVIL,MOTRIN) 800 MG tablet  (Status: Discontinued) Take 1 tablet (800 mg total) by mouth every 8 (eight) hours as needed for Pain. 20 tablet 12/3/2023 12/3/2023 Alvarado Mendes DO    ibuprofen (ADVIL,MOTRIN) 800 MG tablet Take 1 tablet (800 mg total) by mouth every 8 (eight) hours as needed for Pain. 20 tablet 12/3/2023 -- Alvarado Mendes DO          Follow-up Information       Follow up With Specialties Details Why Contact Info    [facility] Emergency Medicine  If symptoms worsen [address] [facility] [id number] [phone]               Alvarado Mendes DO  12/05/23 7784

## 2023-12-16 PROBLEM — Z79.01 CHRONIC ANTICOAGULATION: Status: ACTIVE | Noted: 2023-12-16

## 2023-12-16 PROBLEM — I82.409 DVT, LOWER EXTREMITY, RECURRENT: Status: ACTIVE | Noted: 2023-12-16

## 2023-12-16 PROBLEM — I82.451 ACUTE DEEP VEIN THROMBOSIS (DVT) OF RIGHT PERONEAL VEIN: Status: ACTIVE | Noted: 2023-12-16

## 2024-01-23 ENCOUNTER — HOSPITAL ENCOUNTER (EMERGENCY)
Facility: HOSPITAL | Age: 54
Discharge: HOME | End: 2024-01-23
Attending: EMERGENCY MEDICINE
Payer: MEDICAID

## 2024-01-23 VITALS
BODY MASS INDEX: 33.38 KG/M2 | TEMPERATURE: 98 F | SYSTOLIC BLOOD PRESSURE: 130 MMHG | WEIGHT: 194.44 LBS | OXYGEN SATURATION: 99 % | HEART RATE: 80 BPM | RESPIRATION RATE: 16 BRPM | DIASTOLIC BLOOD PRESSURE: 80 MMHG

## 2024-01-23 DIAGNOSIS — R10.9 ABDOMINAL PAIN, UNSPECIFIED ABDOMINAL LOCATION: ICD-10-CM

## 2024-01-23 DIAGNOSIS — N39.0 URINARY TRACT INFECTION WITHOUT HEMATURIA, SITE UNSPECIFIED: Primary | ICD-10-CM

## 2024-01-23 DIAGNOSIS — M54.30 SCIATICA, UNSPECIFIED LATERALITY: ICD-10-CM

## 2024-01-23 LAB
ALBUMIN SERPL-MCNC: 3.2 G/DL (ref 3.5–5)
ALBUMIN/GLOB SERPL: 0.8 RATIO (ref 1.1–2)
ALP SERPL-CCNC: 114 UNIT/L (ref 40–150)
ALT SERPL-CCNC: 17 UNIT/L (ref 0–55)
AMPHET UR QL SCN: NEGATIVE
APPEARANCE UR: ABNORMAL
AST SERPL-CCNC: 15 UNIT/L (ref 5–34)
BACTERIA #/AREA URNS AUTO: ABNORMAL /HPF
BARBITURATE SCN PRESENT UR: NEGATIVE
BASOPHILS # BLD AUTO: 0.03 X10(3)/MCL
BASOPHILS NFR BLD AUTO: 0.5 %
BENZODIAZ UR QL SCN: NEGATIVE
BILIRUB SERPL-MCNC: 0.3 MG/DL
BILIRUB UR QL STRIP.AUTO: NEGATIVE
BUN SERPL-MCNC: 17.2 MG/DL (ref 9.8–20.1)
CALCIUM SERPL-MCNC: 9.5 MG/DL (ref 8.4–10.2)
CANNABINOIDS UR QL SCN: NEGATIVE
CAOX CRY URNS QL MICRO: ABNORMAL /HPF
CHLORIDE SERPL-SCNC: 106 MMOL/L (ref 98–107)
CO2 SERPL-SCNC: 28 MMOL/L (ref 22–29)
COCAINE UR QL SCN: NEGATIVE
COLOR UR AUTO: ABNORMAL
CREAT SERPL-MCNC: 1.04 MG/DL (ref 0.55–1.02)
CRP SERPL-MCNC: 9.5 MG/L
EOSINOPHIL # BLD AUTO: 0.13 X10(3)/MCL (ref 0–0.9)
EOSINOPHIL NFR BLD AUTO: 2.1 %
ERYTHROCYTE [DISTWIDTH] IN BLOOD BY AUTOMATED COUNT: 14.1 % (ref 11.5–17)
FENTANYL UR QL SCN: NEGATIVE
GFR SERPLBLD CREATININE-BSD FMLA CKD-EPI: >60 MLS/MIN/1.73/M2
GLOBULIN SER-MCNC: 3.8 GM/DL (ref 2.4–3.5)
GLUCOSE SERPL-MCNC: 119 MG/DL (ref 74–100)
GLUCOSE UR QL STRIP.AUTO: NORMAL
HCT VFR BLD AUTO: 40 % (ref 37–47)
HGB BLD-MCNC: 12.3 G/DL (ref 12–16)
HOLD SPECIMEN: NORMAL
HOLD SPECIMEN: NORMAL
HYALINE CASTS #/AREA URNS LPF: ABNORMAL /LPF
IMM GRANULOCYTES # BLD AUTO: 0.03 X10(3)/MCL (ref 0–0.04)
IMM GRANULOCYTES NFR BLD AUTO: 0.5 %
KETONES UR QL STRIP.AUTO: NEGATIVE
LACTATE SERPL-SCNC: 1.4 MMOL/L (ref 0.5–2.2)
LEUKOCYTE ESTERASE UR QL STRIP.AUTO: 250
LIPASE SERPL-CCNC: 34 U/L
LYMPHOCYTES # BLD AUTO: 2.37 X10(3)/MCL (ref 0.6–4.6)
LYMPHOCYTES NFR BLD AUTO: 37.6 %
MAGNESIUM SERPL-MCNC: 1.7 MG/DL (ref 1.6–2.6)
MCH RBC QN AUTO: 27.2 PG (ref 27–31)
MCHC RBC AUTO-ENTMCNC: 30.8 G/DL (ref 33–36)
MCV RBC AUTO: 88.3 FL (ref 80–94)
MDMA UR QL SCN: NEGATIVE
MONOCYTES # BLD AUTO: 0.57 X10(3)/MCL (ref 0.1–1.3)
MONOCYTES NFR BLD AUTO: 9 %
MUCOUS THREADS URNS QL MICRO: ABNORMAL /LPF
NEUTROPHILS # BLD AUTO: 3.17 X10(3)/MCL (ref 2.1–9.2)
NEUTROPHILS NFR BLD AUTO: 50.3 %
NITRITE UR QL STRIP.AUTO: NEGATIVE
NRBC BLD AUTO-RTO: 0 %
OPIATES UR QL SCN: POSITIVE
PCP UR QL: NEGATIVE
PH UR STRIP.AUTO: 5.5 [PH]
PH UR: 5.5 [PH] (ref 3–11)
PLATELET # BLD AUTO: 295 X10(3)/MCL (ref 130–400)
PMV BLD AUTO: 10.4 FL (ref 7.4–10.4)
POCT GLUCOSE: 135 MG/DL (ref 70–110)
POTASSIUM SERPL-SCNC: 3.9 MMOL/L (ref 3.5–5.1)
PROT SERPL-MCNC: 7 GM/DL (ref 6.4–8.3)
PROT UR QL STRIP.AUTO: ABNORMAL
RBC # BLD AUTO: 4.53 X10(6)/MCL (ref 4.2–5.4)
RBC #/AREA URNS AUTO: ABNORMAL /HPF
RBC UR QL AUTO: NEGATIVE
SODIUM SERPL-SCNC: 142 MMOL/L (ref 136–145)
SP GR UR STRIP.AUTO: 1.02 (ref 1–1.03)
SPECIFIC GRAVITY, URINE AUTO (.000) (OHS): 1.02 (ref 1–1.03)
SQUAMOUS #/AREA URNS LPF: ABNORMAL /HPF
TROPONIN I SERPL-MCNC: <0.01 NG/ML (ref 0–0.04)
UROBILINOGEN UR STRIP-ACNC: NORMAL
WBC # SPEC AUTO: 6.3 X10(3)/MCL (ref 4.5–11.5)
WBC #/AREA URNS AUTO: ABNORMAL /HPF

## 2024-01-23 PROCEDURE — 83690 ASSAY OF LIPASE: CPT | Performed by: EMERGENCY MEDICINE

## 2024-01-23 PROCEDURE — 84484 ASSAY OF TROPONIN QUANT: CPT | Performed by: EMERGENCY MEDICINE

## 2024-01-23 PROCEDURE — 80307 DRUG TEST PRSMV CHEM ANLYZR: CPT | Performed by: EMERGENCY MEDICINE

## 2024-01-23 PROCEDURE — 63600175 PHARM REV CODE 636 W HCPCS: Performed by: EMERGENCY MEDICINE

## 2024-01-23 PROCEDURE — 86140 C-REACTIVE PROTEIN: CPT | Performed by: EMERGENCY MEDICINE

## 2024-01-23 PROCEDURE — 83735 ASSAY OF MAGNESIUM: CPT | Performed by: EMERGENCY MEDICINE

## 2024-01-23 PROCEDURE — 25500020 PHARM REV CODE 255

## 2024-01-23 PROCEDURE — 85025 COMPLETE CBC W/AUTO DIFF WBC: CPT | Performed by: EMERGENCY MEDICINE

## 2024-01-23 PROCEDURE — 25000003 PHARM REV CODE 250: Performed by: EMERGENCY MEDICINE

## 2024-01-23 PROCEDURE — 96375 TX/PRO/DX INJ NEW DRUG ADDON: CPT

## 2024-01-23 PROCEDURE — 99285 EMERGENCY DEPT VISIT HI MDM: CPT | Mod: 25

## 2024-01-23 PROCEDURE — 96365 THER/PROPH/DIAG IV INF INIT: CPT | Mod: 59

## 2024-01-23 PROCEDURE — 81001 URINALYSIS AUTO W/SCOPE: CPT | Mod: XB | Performed by: EMERGENCY MEDICINE

## 2024-01-23 PROCEDURE — 80053 COMPREHEN METABOLIC PANEL: CPT | Performed by: EMERGENCY MEDICINE

## 2024-01-23 PROCEDURE — 83605 ASSAY OF LACTIC ACID: CPT | Performed by: EMERGENCY MEDICINE

## 2024-01-23 PROCEDURE — 82962 GLUCOSE BLOOD TEST: CPT

## 2024-01-23 PROCEDURE — 87086 URINE CULTURE/COLONY COUNT: CPT | Performed by: EMERGENCY MEDICINE

## 2024-01-23 RX ORDER — CEPHALEXIN 500 MG/1
500 CAPSULE ORAL 4 TIMES DAILY
Qty: 28 CAPSULE | Refills: 0 | Status: SHIPPED | OUTPATIENT
Start: 2024-01-23 | End: 2024-01-30

## 2024-01-23 RX ORDER — KETOROLAC TROMETHAMINE 30 MG/ML
30 INJECTION, SOLUTION INTRAMUSCULAR; INTRAVENOUS
Status: COMPLETED | OUTPATIENT
Start: 2024-01-23 | End: 2024-01-23

## 2024-01-23 RX ORDER — METHOCARBAMOL 100 MG/ML
1000 INJECTION, SOLUTION INTRAMUSCULAR; INTRAVENOUS
Status: COMPLETED | OUTPATIENT
Start: 2024-01-23 | End: 2024-01-23

## 2024-01-23 RX ADMIN — KETOROLAC TROMETHAMINE 30 MG: 30 INJECTION, SOLUTION INTRAMUSCULAR; INTRAVENOUS at 09:01

## 2024-01-23 RX ADMIN — METHOCARBAMOL 1000 MG: 100 INJECTION, SOLUTION INTRAMUSCULAR; INTRAVENOUS at 09:01

## 2024-01-23 RX ADMIN — CEFTRIAXONE SODIUM 1 G: 1 INJECTION, POWDER, FOR SOLUTION INTRAMUSCULAR; INTRAVENOUS at 11:01

## 2024-01-23 RX ADMIN — IOHEXOL 100 ML: 350 INJECTION, SOLUTION INTRAVENOUS at 10:01

## 2024-01-23 NOTE — ED PROVIDER NOTES
"Encounter Date: 1/23/2024       History     Chief Complaint   Patient presents with    Abdominal Pain     Patient complaining of sharp right sided abdominal pain that radiates to right flank. Hx. Of kidney stones, denies any urinary complaints.      Right hip pain radiating to right thigh; ongoing intermittent symptoms for several months; working diagnosis has been sciatica; patient here today concerned about her abdomen given she also notices right flank pain / right lower quadrant pain.      Abdominal Pain  The current episode started two days ago. The onset of the illness was gradual. The problem has been gradually worsening. The abdominal pain is located in the suprapubic region (right mid-abdominal pain). The severity of the abdominal pain is 2/10. The abdominal pain is relieved by nothing. The other symptoms of the illness do not include fever, jaundice, melena, nausea, vomiting, diarrhea, hematemesis or hematochezia.   The patient states that she believes she is currently not pregnant. The patient has not had a change in bowel habit. Additional symptoms associated with the illness include urgency. Symptoms associated with the illness do not include chills, anorexia, diaphoresis or heartburn. Significant associated medical issues do not include PUD, GERD, inflammatory bowel disease or diabetes.     Review of patient's allergies indicates:   Allergen Reactions    Corticosteroids (glucocorticoids) Swelling     Other reaction(s): reports allergic "to all steriods"     Past Medical History:   Diagnosis Date    Abnormal Pap smear of cervix     5 years ago    Asthma     Diabetes     DVT (deep venous thrombosis) 06/2018    left leg    Hypertension      Past Surgical History:   Procedure Laterality Date    HYSTERECTOMY      LAPAROSCOPIC CHOLECYSTECTOMY N/A 8/31/2018    Procedure: CHOLECYSTECTOMY, LAPAROSCOPIC;  Surgeon: Aki Watt MD;  Location: [facility];  Service: General;  Laterality: N/A;  No PAT " Available-Anes in AM  VIDEO    TONSILLECTOMY, ADENOIDECTOMY  06/2017    TUBAL LIGATION       History reviewed. No pertinent family history.  Social History     Tobacco Use    Smoking status: Never    Smokeless tobacco: Never   Substance Use Topics    Alcohol use: No    Drug use: No     Review of Systems   Constitutional:  Negative for chills, diaphoresis and fever.   Gastrointestinal:  Positive for abdominal pain. Negative for anorexia, diarrhea, heartburn, hematemesis, hematochezia, jaundice, melena, nausea and vomiting.   Genitourinary:  Positive for urgency.   All other systems reviewed and are negative.      Physical Exam     Initial Vitals [01/23/24 0818]   BP Pulse Resp Temp SpO2   131/85 108 18 98.1 °F (36.7 °C) 99 %      MAP       --         Physical Exam    Nursing note and vitals reviewed.  Constitutional: She appears well-developed and well-nourished. She is not diaphoretic. No distress.   HENT:   Head: Normocephalic and atraumatic.   Right Ear: External ear normal.   Left Ear: External ear normal.   Eyes: EOM are normal. Pupils are equal, round, and reactive to light. Right eye exhibits no discharge. Left eye exhibits no discharge.   Neck: Neck supple. No thyromegaly present. No tracheal deviation present. No JVD present.   Normal range of motion.  Cardiovascular:  Normal rate, regular rhythm, normal heart sounds and intact distal pulses.     Exam reveals no gallop and no friction rub.       No murmur heard.  Pulmonary/Chest: Breath sounds normal. No stridor. No respiratory distress. She has no wheezes. She has no rhonchi. She has no rales.   Abdominal: Abdomen is soft. Bowel sounds are normal. She exhibits no distension. There is no abdominal tenderness. There is no rebound and no guarding.   Musculoskeletal:         General: No tenderness or edema. Normal range of motion.      Cervical back: Normal range of motion and neck supple.     Neurological: She is alert and oriented to person, place, and time.  She has normal strength. No cranial nerve deficit or sensory deficit. GCS score is 15. GCS eye subscore is 4. GCS verbal subscore is 5. GCS motor subscore is 6.   Skin: Skin is warm and dry. Capillary refill takes less than 2 seconds. No rash and no abscess noted. No erythema. No pallor.   Psychiatric: She has a normal mood and affect. Her behavior is normal. Judgment and thought content normal.         ED Course   Procedures  Labs Reviewed   COMPREHENSIVE METABOLIC PANEL - Abnormal; Notable for the following components:       Result Value    Glucose Level 119 (*)     Creatinine 1.04 (*)     Albumin Level 3.2 (*)     Globulin 3.8 (*)     Albumin/Globulin Ratio 0.8 (*)     All other components within normal limits   C-REACTIVE PROTEIN - Abnormal; Notable for the following components:    C-Reactive Protein 9.50 (*)     All other components within normal limits   URINALYSIS, REFLEX TO URINE CULTURE - Abnormal; Notable for the following components:    Appearance, UA Turbid (*)     Protein, UA Trace (*)     Leukocyte Esterase,  (*)     WBC, UA 51-99 (*)     Bacteria, UA Occ (*)     Squamous Epithelial Cells, UA Many (*)     Mucous, UA Trace (*)     Calcium Oxalate Crystals, UA Few (*)     All other components within normal limits   DRUG SCREEN, URINE (BEAKER) - Abnormal; Notable for the following components:    Opiates, Urine Positive (*)     All other components within normal limits    Narrative:     Cut off concentrations:    Amphetamines - 1000 ng/ml  Barbiturates - 200 ng/ml  Benzodiazepine - 200 ng/ml  Cannabinoids (THC) - 50 ng/ml  Cocaine - 300 ng/ml  Fentanyl - 1.0 ng/ml  MDMA - 500 ng/ml  Opiates - 300 ng/ml   Phencyclidine (PCP) - 25 ng/ml    Specimen submitted for drug analysis and tested for pH and specific gravity in order to evaluate sample integrity. Suspect tampering if specific gravity is <1.003 and/or pH is not within the range of 4.5 - 8.0  False negatives may result form substances such as  bleach added to urine.  False positives may result for the presence of a substance with similar chemical structure to the drug or its metabolite.    This test provides only a PRELIMINARY analytical test result. A more specific alternate chemical method must be used in order to obtain a confirmed analytical result. Gas chromatography/mass spectrometry (GC/MS) is the preferred confirmatory method. Other chemical confirmation methods are available. Clinical consideration and professional judgement should be applied to any drug of abuse test result, particularly when preliminary positive results are used.    Positive results will be confirmed only at the physicians request. Unconfirmed screening results are to be used only for medical purposes (treatment).        CBC WITH DIFFERENTIAL - Abnormal; Notable for the following components:    MCHC 30.8 (*)     All other components within normal limits   POCT GLUCOSE - Abnormal; Notable for the following components:    POCT Glucose 135 (*)     All other components within normal limits   LIPASE - Normal   LACTIC ACID, PLASMA - Normal   MAGNESIUM - Normal   TROPONIN I - Normal   CULTURE, URINE   CBC W/ AUTO DIFFERENTIAL    Narrative:     The following orders were created for panel order CBC auto differential.  Procedure                               Abnormality         Status                     ---------                               -----------         ------                     CBC with Differential[[id number]]        Abnormal            Final result                 Please view results for these tests on the individual orders.   EXTRA TUBES    Narrative:     The following orders were created for panel order EXTRA TUBES.  Procedure                               Abnormality         Status                     ---------                               -----------         ------                     Light Blue Top Hold[[id number]]                              Final result                Gold Top Hold[[id number]]                                    Final result                 Please view results for these tests on the individual orders.   LIGHT BLUE TOP HOLD   GOLD TOP HOLD          Imaging Results              CT Abdomen Pelvis With IV Contrast NO Oral Contrast (Final result)  Result time 01/23/24 11:09:17      Final result by Shaquille Hoover MD (01/23/24 11:09:17)                   Impression:      No acute abdominopelvic findings.      Electronically signed by: Shaquille Hoover  Date:    01/23/2024  Time:    11:09               Narrative:    EXAMINATION:  CT ABDOMEN PELVIS WITH IV CONTRAST    CLINICAL HISTORY:  RLQ, r flank pain;    TECHNIQUE:  Helical acquisition through the abdomen and pelvis with IV contrast.  Three plane reconstructions were provided for review.  mGycm. Automatic exposure control, adjustment of mA/kV or iterative reconstruction technique was used to reduce radiation.    COMPARISON:  5 February 2022    FINDINGS:  The limited imaged lung bases are clear.    Liver is mildly enlarged.  Suspect hepatic steatosis.  Patent portal vein.  Gallbladder surgically absent.    No significant abnormality of the spleen, pancreas or adrenals.    No hydronephrosis.  Bilateral medullary calcifications are similar to the prior CT.  There are some areas of renal scarring.  No ureteral calculus seen.    No bowel obstruction. No significant inflammatory changes of the bowel.  Normal appendix.  No free air.    Urinary bladder is unremarkable. No free fluid. Aorta normal in caliber.    No acute osseous findings.                                    X-Rays:   Independently Interpreted Readings:   Other Readings:  - CT abd / pelvis without acute abnormal findings ;    Medications   cefTRIAXone (Rocephin) 1 g in dextrose 5 % in water (D5W) 100 mL IVPB (MB+) ( Intravenous Restarted 1/23/24 7775)   ketorolac injection 30 mg (30 mg Intravenous Given 1/23/24 4563)   methocarbamoL injection 1,000 mg  (1,000 mg Intravenous Given 1/23/24 0943)   iohexoL (OMNIPAQUE 350) 350 mg iodine/mL injection (100 mLs Intravenous Given 1/23/24 1030)     Medical Decision Making    53-year-old woman presents to the emergency department today with right mid abdominal pain, suprapubic abdominal pain, low back pain radiating to the right hip and thigh.  Patient is endorsing some frequency, though no overt dysuria, no fevers, no chills, no nausea, no vomiting.  Differential diagnosis includes sciatica (existing diagnosis), also UTI, renal colic, pyelonephritis, GERD, gastritis, others ....    Amount and/or Complexity of Data Reviewed  External Data Reviewed: notes.  Labs: ordered. Decision-making details documented in ED Course.     Details: As above  Radiology: ordered and independent interpretation performed. Decision-making details documented in ED Course.     Details: - CT abd / pelvis without acute abnormal findings ;    Risk  Prescription drug management.  Risk Details:  This is a woman with known chronic sciatica a now superimposed worsening symptoms, not obviously differentiable on the basis of history and physical.  Risk found sufficient to warrant an expanded evaluation with objective data.  The objective data are resulted in found reassuring.  Labs are most compatible with UTI.  The sciatic symptoms are improved with conservative interventions.  Given findings appear obviously compatible with UTI (nothing suggestive of systemic inflammatory reaction, nothing suggestive of pyelonephritis) we do plan to treat with antibiotics.  Patient is discharged home with prescriptions, anticipatory guidance, return precautions, follow-up instructions.  Home in stable condition without event.               ED Course as of 01/23/24 1200   Tue Jan 23, 2024   0940 Normal lactate ; [CT]   0942 Reassuring hemogram ; [CT]   0959 Minimally elevated crp ; [CT]   0959 Normal magnesium ; [CT]   0959 Normal lipase ; [CT]   1000 Reassuring  chemistries ; [CT]   1110 Urinalysis compatible with UTI ; [CT]   1110 Utox positive for opiates ; [CT]      ED Course User Index  [CT] Tavo Hayes MD                           Clinical Impression:  Final diagnoses:  [R10.9] Abdominal pain, unspecified abdominal location  [N39.0] Urinary tract infection without hematuria, site unspecified (Primary)  [M54.30] Sciatica, unspecified laterality          ED Disposition Condition    Discharge Stable          ED Prescriptions       Medication Sig Dispense Start Date End Date Auth. Provider    cephALEXin (KEFLEX) 500 MG capsule Take 1 capsule (500 mg total) by mouth 4 (four) times daily. for 7 days 28 capsule 1/23/2024 1/30/2024 Tavo Hayes MD          Follow-up Information       Follow up With Specialties Details Why Contact Info    [facility] Emergency Medicine  As needed, If symptoms worsen [address] [facility] [id number] [phone]             Tavo Hayes MD  01/23/24 1200

## 2024-01-25 LAB — BACTERIA UR CULT: ABNORMAL

## 2024-05-10 ENCOUNTER — TELEPHONE (OUTPATIENT)
Dept: HEMATOLOGY/ONCOLOGY | Facility: CLINIC | Age: 54
End: 2024-05-10
Payer: MEDICAID

## 2024-05-12 NOTE — PROGRESS NOTES
History:  Past Medical History:   Diagnosis Date    Abnormal Pap smear of cervix     5 years ago    Asthma     Diabetes     DVT (deep venous thrombosis) 06/2018    left leg    Hypertension    Past medical history:  Hypertension.  Sciatica (pains radiates down posterior right leg).  Pain right leg. NIDDM.  Mixed dyslipidemia.  Dysphagia.  Pulmonary embolism.  DVT.  Kidney stones.  NIDDM.  CKD.  Nephrolithiasis.  Mood disorder.  Dysphagia following CVAs.       Procedure/surgical history:   - Ureterolithiasis, right-sided extraction 10/05/2020 (Loren Arreola MD, [facility]) (history of 8 mm right ureteral stone, S/P stent placement in August 2020) (underwent right ureteroscopy with stone extraction and stent exchange  -08/31/2018: Laparoscopic cholecystectomy (cholecystitis, cholelithiasis)  -08/15/2017:  D&C, hysteroscopy with Adilia ablation (indication:  Metromenorrhagia; endometrial polyp)      Social history: Single.  Lives in [place].  Has 4 children.  Does not work.  No history of tobacco, alcohol, or illicit drug abuse.    Family history: Mother experienced lower extremity DVT at age 55; she was a smoker.    Health maintenance:  Says that she had screening mammogram performed 2 months ago (approximately March 2024) in [place], and that it was unremarkable.  No screening colonoscopy ever.    Menstrual/Ob gyn history: No history of miscarriages.  Chronic menorrhagia.  Cycles last 2-3 weeks; this has been going on for several years.  Past Surgical History:   Procedure Laterality Date    HYSTERECTOMY      LAPAROSCOPIC CHOLECYSTECTOMY N/A 8/31/2018    Procedure: CHOLECYSTECTOMY, LAPAROSCOPIC;  Surgeon: Aki Watt MD;  Location: [facility];  Service: General;  Laterality: N/A;  No PAT Available-Anes in AM  VIDEO    TONSILLECTOMY, ADENOIDECTOMY  06/2017    TUBAL LIGATION        Social History     Socioeconomic History    Marital status:    Tobacco Use    Smoking status: Never     Smokeless tobacco: Never   Substance and Sexual Activity    Alcohol use: Yes    Drug use: No    Sexual activity: Yes     Partners: Male     Birth control/protection: None     Social Determinants of Health     Financial Resource Strain: Low Risk  (6/7/2022)    Overall Financial Resource Strain (CARDIA)     Difficulty of Paying Living Expenses: Not hard at all   Food Insecurity: No Food Insecurity (6/7/2022)    Hunger Vital Sign     Worried About Running Out of Food in the Last Year: Never true     Ran Out of Food in the Last Year: Never true   Transportation Needs: No Transportation Needs (6/7/2022)    PRAPARE - Transportation     Lack of Transportation (Medical): No     Lack of Transportation (Non-Medical): No   Housing Stability: Unknown (6/7/2022)    Housing Stability Vital Sign     Unable to Pay for Housing in the Last Year: No     Unstable Housing in the Last Year: No      No family history on file.     Reason for Follow-up:  Reason for consultation:  Recurrent lower extremity DVT  Remote history of lower extremity DVT  Acute DVT right peroneal vein 06/29/2023  Chronically anticoagulated  Family history of DVT   History of menorrhagia  Bilateral lower extremity post phlebitic syndrome    History of Present Illness:   Recurrent DVT        Oncologic/Hematologic History:  Oncology History    No history exists.   53-year-old lady, referred by Jadiel Luciano MD, [facility], with recurrent DVT.        07/18/2023:  Office note, Jadiel Luciano MD:  -presented to ER 3 weeks ago with right-sided low back pain and sciatica and bilateral lower leg pain   -history of thromboembolic disease nearly 10 years ago; since, discontinued Xarelto until above presentation 3 weeks ago  -pain right lower back and leg  -denies chest pain, dyspnea, palpitations, or pain elsewhere  -left lower extremity negative for thrombus  -continued menses, heavy flow and cramps     Clinical events reviewed:  -according to her, she  fractured her left toe 20 years ago; was placed in a boot; developed left lower extremity DVT as a result; according to her, she was anticoagulated for 5 months ([facility]  -according to her, clot went away after 5 months   -according to her, she fractured her left toe 20 years ago; was placed in a boot; developed left lower extremity DVT as a result; according to her, she was anticoagulated for 5 months ([facility]  -according to her, clot went away after 5 months   >>>  -developed swelling of left lower extremity  -12/16/2020 ([facility]):  Venous ultrasound lower extremity bilateral (indication:  Chronic DVT right iliac vein):  No evidence of DVT  >>>  -06/23/2021: CTA chest (comparison: CTA chest 09/14/2018):  No pulmonary thromboembolism or pulmonary infarct; stable 0.4 cm pulmonary nodule within the lingula  >>>  -07/20/2022:  Venous Doppler left lower extremity (comparison:  09/14/2018):  No DVT left lower extremity  -09/08/2022:  CTA chest (comparison:  06/23/2021):  No evidence for pulmonary embolism centrally given poor opacification of the pulmonary artery.  Cannot evaluate for segmental and subsegmental pulmonary emboli.  No other acute process of the thorax is identified.   >>>  -09/08/2022:  Venous Doppler left lower extremity:  No left lower extremity DVT  >>>  -06/29/2023:  Venous Doppler bilateral lower extremities:  Right lower extremity acute, occlusive deep vein thrombosis in both peroneal veins at the upper calf  -according to her, she was started on anticoagulation with Xarelto  -according to her, if she ever stopped Xarelto, she starts experiencing very uncomfortable swelling in both legs  -according to her, the right lower extremity acute occlusive DVT in both peroneal veins at the upper calf on 06/29/2023, was spontaneous, with no triggering factors like any hormonal intake, bed rest, stasis, any surgical  "procedure, or prolonged travel  -12/03/2023:  X-ray right hip; degenerative arthritic changes  -01/23/2024:  CTs A/P with IV contrast pelvic comparison:  02/05/2022) (right lower quadrant, right flank pain):  No acute abdominopelvic findings    History is rather sketchy  It was apparent that she develops lower extremity DVT when she stops anticoagulation   Is apparent that she experiences post phlebitic syndrome in both legs, more so in left leg, when she stops anticoagulation  She denies history of pulmonary thromboembolism    05/13/2024:   Pleasant lady who presents for initial hematology consultation.  In no acute discomfort.  Currently, on Xarelto 20 mg daily   Chronic menorrhagia; cycles last 2-3 weeks.  She is contemplating hysterectomy  Her medical record mentions hysterectomy; however, she says that she has not had hysterectomy done as yet.  No abnormal bleeding in any form  She wears compression stockings for bilateral post phlebitic syndrome; requesting referral to physical therapy  No unusual headaches, focal neurological symptoms, chest pain, cough, dyspnea, abdominal pain, nausea, vomiting, etc..  No history of any malignancy or any other blood disorders.  No history of autoimmune or connective tissue disorder, either.  No family history of thrombophilia except mother experienced DVT at age 55; she used to smoke        Labs:  -03/18/2023: CBC normal; hemoglobin 13.2    Interval History:  [No matching plan found]   [No matching plan found]       Medications:  Current Outpatient Medications on File Prior to Visit   Medication Sig Dispense Refill    albuterol (PROVENTIL/VENTOLIN HFA) 90 mcg/actuation inhaler 2 Puffs by Inhalation route every 4 hours PRN cough or wheeze      atorvastatin (LIPITOR) 80 MG tablet Take 80 mg by mouth every evening.      BD ULTRA-FINE SHORT PEN NEEDLE 31 gauge x 5/16" Ndle USE AS DIRECTED to INJECT insulin      cyclobenzaprine (FLEXERIL) 10 MG tablet Take 10 mg by mouth 2 (two) " times daily as needed.      glipiZIDE (GLUCOTROL) 10 MG tablet Take 10 mg by mouth once daily.      ibuprofen (ADVIL,MOTRIN) 800 MG tablet Take 1 tablet (800 mg total) by mouth every 8 (eight) hours as needed for Pain. 20 tablet 0    insulin glargine 100 units/mL SubQ pen Inject 20 Units into the skin every evening.      ketoconazole (NIZORAL) 2 % cream Apply topically 2 (two) times daily. (Patient taking differently: Apply topically 2 (two) times daily as needed.) 60 g 0    lansoprazole (PREVACID) 15 MG capsule Take 15 mg by mouth 2 (two) times a day.      LIDOcaine (LIDODERM) 5 % Place 1 patch onto the skin once daily. Remove & Discard patch within 12 hours or as directed by MD 30 patch 0    lisinopriL 10 MG tablet Take 10 mg by mouth once daily.      LORazepam (ATIVAN) 0.5 MG tablet Take 0.5 mg by mouth 2 (two) times daily.      metFORMIN (GLUCOPHAGE) 1000 MG tablet Take 1,000 mg by mouth 2 (two) times daily with meals.      mirtazapine (REMERON) 30 MG tablet Take 30 mg by mouth every evening.      ondansetron (ZOFRAN-ODT) 4 MG TbDL Take 1 tablet (4 mg total) by mouth every 6 (six) hours as needed. 12 tablet 0    tamsulosin (FLOMAX) 0.4 mg Cap Take 0.4 mg by mouth every evening.      TRUE METRIX GLUCOSE TEST STRIP Strp use ONE strip to test blood sugar ONCE DAILY      TRUEPLUS LANCETS 28 gauge Misc USE ONE LANCET TO CHECK BLOOD SUGAR      epinephrine (EPIPEN) 0.3 mg/0.3 mL AtIn Inject 0.3 mLs (0.3 mg total) into the muscle as needed. 1 Device 0    famotidine (PEPCID) 20 MG tablet Take 1 tablet (20 mg total) by mouth 2 (two) times daily. 20 tablet 0     Current Facility-Administered Medications on File Prior to Visit   Medication Dose Route Frequency Provider Last Rate Last Admin    lidocaine (PF) 10 mg/ml (1%) injection 10 mg  1 mL Intradermal Once Aki Watt MD           Review of Systems:   All systems reviewed and found to be negative except for the symptoms detailed above    Physical Examination:    VITAL SIGNS:   Vitals:    05/13/24 1100   BP: 132/87   Pulse: 98   Resp: 18   Temp: 98.5 °F (36.9 °C)     GENERAL:  In no apparent distress.    HEAD:  No signs of head trauma.  EYES:  Pupils are equal.  Extraocular motions intact.    EARS:  Hearing grossly intact.  MOUTH:  Oropharynx is normal.   NECK:  No adenopathy, no JVD.     CHEST:  Chest with clear breath sounds bilaterally.  No wheezes, rales, rhonchi.    CARDIAC:  Regular rate and rhythm.  S1 and S2, without murmurs, gallops, rubs.  VASCULAR:  No Edema.  Peripheral pulses normal and equal in all extremities.  ABDOMEN:  Soft, without detectable tenderness.  No sign of distention.  No   rebound or guarding, and no masses palpated.   Bowel Sounds normal.  MUSCULOSKELETAL:  Good range of motion of all major joints. Extremities without clubbing, cyanosis or edema.    NEUROLOGIC EXAM:  Alert and oriented x 3.  No focal sensory or strength deficits.   Speech normal.  Follows commands.  PSYCHIATRIC:  Mood normal.    Results for orders placed or performed during the hospital encounter of 01/23/24   CBC auto differential    Narrative    The following orders were created for panel order CBC auto differential.  Procedure                               Abnormality         Status                     ---------                               -----------         ------                     CBC with Differential[[id number]]        Abnormal            Final result                 Please view results for these tests on the individual orders.   CBC with Differential   Result Value Ref Range    WBC 6.30 4.50 - 11.50 x10(3)/mcL    RBC 4.53 4.20 - 5.40 x10(6)/mcL    Hgb 12.3 12.0 - 16.0 g/dL    Hct 40.0 37.0 - 47.0 %    MCV 88.3 80.0 - 94.0 fL    MCH 27.2 27.0 - 31.0 pg    MCHC 30.8 (L) 33.0 - 36.0 g/dL    RDW 14.1 11.5 - 17.0 %    Platelet 295 130 - 400 x10(3)/mcL    MPV 10.4 7.4 - 10.4 fL    Neut % 50.3 %    Lymph % 37.6 %    Mono % 9.0 %    Eos % 2.1 %    Basophil % 0.5 %    Lymph  # 2.37 0.6 - 4.6 x10(3)/mcL    Neut # 3.17 2.1 - 9.2 x10(3)/mcL    Mono # 0.57 0.1 - 1.3 x10(3)/mcL    Eos # 0.13 0 - 0.9 x10(3)/mcL    Baso # 0.03 <=0.2 x10(3)/mcL    IG# 0.03 0 - 0.04 x10(3)/mcL    IG% 0.5 %    NRBC% 0.0 %     Results for orders placed or performed during the hospital encounter of 01/23/24   Comprehensive metabolic panel   Result Value Ref Range    Sodium 142 136 - 145 mmol/L    Potassium 3.9 3.5 - 5.1 mmol/L    Chloride 106 98 - 107 mmol/L    CO2 28 22 - 29 mmol/L    Glucose 119 (H) 74 - 100 mg/dL    Blood Urea Nitrogen 17.2 9.8 - 20.1 mg/dL    Creatinine 1.04 (H) 0.55 - 1.02 mg/dL    Calcium 9.5 8.4 - 10.2 mg/dL    Protein Total 7.0 6.4 - 8.3 gm/dL    Albumin 3.2 (L) 3.5 - 5.0 g/dL    Globulin 3.8 (H) 2.4 - 3.5 gm/dL    Albumin/Globulin Ratio 0.8 (L) 1.1 - 2.0 ratio    Bilirubin Total 0.3 <=1.5 mg/dL     40 - 150 unit/L    ALT 17 0 - 55 unit/L    AST 15 5 - 34 unit/L    eGFR >60 mls/min/1.73/m2       Assessment:  Problem List Items Addressed This Visit          Renal/    History of menorrhagia       Hematology    Acute deep vein thrombosis (DVT) of popliteal vein of left lower extremity - Primary    DVT, lower extremity, recurrent    Acute deep vein thrombosis (DVT) of right peroneal vein    Chronic anticoagulation    Family history of DVT    Post-phlebitic syndrome     Other Visit Diagnoses       Recurrent deep vein thrombosis (DVT)              Recurrent lower extremity DVT:  -remote history of lower extremity DVT 20 years ago  -no DVT or pulmonary embolism left lower extremity 12/16/2020, CTA chest 06/23/2021, left lower extremity 07/20/2022, CTA chest 09/08/2022, left lower extremity 09/08/2022  -06/29/2023: Right lower extremity acute, occlusive deep vein thrombosis in both peroneal veins at the upper calf  (acute DVT 06/29/2023 occurred when she had discontinued Xarelto)      Bilateral lower extremity post phlebitic syndrome     History of thrombophilia:   Mother experienced lower  extremity DVT at age 55; used to smoke    History of menorrhagia        Plan:  Check CBC, CMP  Factor 5 Leiden mutation   Prothrombin gene mutation   Anticardiolipin antibody   Beta 2 glycoprotein 1 antibody   Protein C activity  Free protein S antigen assay   Antithrombin 3 cofactor assay   [facility] screen  Indefinite anticoagulation to continue  Refer to physical therapy for post phlebitic syndrome  Follow-up in 3 weeks, with labs  ----------------------------------      -history of DVT 20 years ago  -experienced right lower extremity acute, occlusive deep vein thrombosis in both peroneal veins at the upper calf on 06/29/2023, when she was not taking Xarelto  -therefore, irrespective of results of hypercoagulability workup, she needs to be anticoagulated indefinitely     -will evaluate for thrombophilia     With DOAC:   -Factor V Leiden, prothrombin gene mutation, antiphospholipid antibody are unaffected;   -cannot measure protein C deficiency, protein S deficiency, and Antithrombin III deficiency using functional assays (can cause overestimation of antithrombin levels; can not measure protein C, protein S, and antithrombin using functional assays with DOAC);   -antiphospholipid antibodies are unaffected because they are serologic assays and are unaffected by anticoagulation (anticardiolipin antibody; beta 2 glycoprotein 1 antibody);   -lupus anticoagulant should only be tested in the absence of any anticoagulant as it is clot based test)  >>>  -will test for factor V Leiden mutation, prothrombin gene mutation, anticardiolipin antibody, beta 2 glycoprotein 1 antibody, protein C activity, free protein S antigen assay, antithrombin 3 cofactor assay  -will order [facility] screen  -per CBC, no suspicion of polycythemia vera or essential thrombocytosis  -multiple urinalyses up until 09/08/2022, negative for proteinuria; therefore, no suspicion of nephrotic syndrome  -check for any use of hormone replacement therapy/oral  contraceptives    Management of menorrhagia deferred to her PCP/gyn  Hormonal therapy will be contraindicated in view of recurrent DVTs    Follow-up in 3 weeks, with labs    Above discussed at length with the patient.  All questions answered.    Discussed labs and scans.    Discussed plan of management.  She understands and agrees with this plan.     Follow-up:  No follow-ups on file.      Answers submitted by the patient for this visit:  Review of Systems Questionnaire (Submitted on 5/10/2024)  appetite change : No  unexpected weight change: No  mouth sores: No  visual disturbance: Yes  cough: Yes  shortness of breath: Yes  chest pain: Yes  abdominal pain: Yes  diarrhea: No  frequency: Yes  back pain: Yes  rash: No  headaches: No  adenopathy: No  nervous/ anxious: No

## 2024-05-13 ENCOUNTER — OFFICE VISIT (OUTPATIENT)
Dept: HEMATOLOGY/ONCOLOGY | Facility: CLINIC | Age: 54
End: 2024-05-13
Attending: INTERNAL MEDICINE
Payer: MEDICAID

## 2024-05-13 VITALS
HEART RATE: 98 BPM | OXYGEN SATURATION: 97 % | WEIGHT: 186 LBS | HEIGHT: 64 IN | SYSTOLIC BLOOD PRESSURE: 132 MMHG | DIASTOLIC BLOOD PRESSURE: 87 MMHG | BODY MASS INDEX: 31.76 KG/M2 | RESPIRATION RATE: 18 BRPM | TEMPERATURE: 99 F

## 2024-05-13 DIAGNOSIS — Z79.01 CHRONIC ANTICOAGULATION: ICD-10-CM

## 2024-05-13 DIAGNOSIS — I87.009 POST-PHLEBITIC SYNDROME: ICD-10-CM

## 2024-05-13 DIAGNOSIS — I82.432 ACUTE DEEP VEIN THROMBOSIS (DVT) OF POPLITEAL VEIN OF LEFT LOWER EXTREMITY: Primary | ICD-10-CM

## 2024-05-13 DIAGNOSIS — I82.409 RECURRENT DEEP VEIN THROMBOSIS (DVT): ICD-10-CM

## 2024-05-13 DIAGNOSIS — Z87.42 HISTORY OF MENORRHAGIA: ICD-10-CM

## 2024-05-13 DIAGNOSIS — I82.409 RECURRENT DEEP VEIN THROMBOSIS (DVT) OF LOWER EXTREMITY, UNSPECIFIED LATERALITY: ICD-10-CM

## 2024-05-13 DIAGNOSIS — Z82.49 FAMILY HISTORY OF DVT: ICD-10-CM

## 2024-05-13 DIAGNOSIS — I82.451 ACUTE DEEP VEIN THROMBOSIS (DVT) OF RIGHT PERONEAL VEIN: ICD-10-CM

## 2024-05-13 DIAGNOSIS — I82.432 ACUTE DEEP VEIN THROMBOSIS (DVT) OF POPLITEAL VEIN OF LEFT LOWER EXTREMITY: ICD-10-CM

## 2024-05-13 LAB
ALBUMIN SERPL-MCNC: 3.4 G/DL (ref 3.5–5)
ALBUMIN/GLOB SERPL: 0.8 RATIO (ref 1.1–2)
ALP SERPL-CCNC: 111 UNIT/L (ref 40–150)
ALT SERPL-CCNC: 15 UNIT/L (ref 0–55)
AST SERPL-CCNC: 14 UNIT/L (ref 5–34)
BASOPHILS # BLD AUTO: 0.03 X10(3)/MCL
BASOPHILS NFR BLD AUTO: 0.4 %
BILIRUB SERPL-MCNC: 0.4 MG/DL
BUN SERPL-MCNC: 23.5 MG/DL (ref 9.8–20.1)
CALCIUM SERPL-MCNC: 9.7 MG/DL (ref 8.4–10.2)
CHLORIDE SERPL-SCNC: 107 MMOL/L (ref 98–107)
CO2 SERPL-SCNC: 25 MMOL/L (ref 22–29)
CREAT SERPL-MCNC: 1.01 MG/DL (ref 0.55–1.02)
EOSINOPHIL # BLD AUTO: 0.11 X10(3)/MCL (ref 0–0.9)
EOSINOPHIL NFR BLD AUTO: 1.5 %
ERYTHROCYTE [DISTWIDTH] IN BLOOD BY AUTOMATED COUNT: 14.3 % (ref 11.5–17)
GFR SERPLBLD CREATININE-BSD FMLA CKD-EPI: >60 ML/MIN/1.73/M2
GLOBULIN SER-MCNC: 4.3 GM/DL (ref 2.4–3.5)
GLUCOSE SERPL-MCNC: 191 MG/DL (ref 74–100)
HCT VFR BLD AUTO: 39.3 % (ref 37–47)
HGB BLD-MCNC: 12.6 G/DL (ref 12–16)
IMM GRANULOCYTES # BLD AUTO: 0.01 X10(3)/MCL (ref 0–0.04)
IMM GRANULOCYTES NFR BLD AUTO: 0.1 %
LYMPHOCYTES # BLD AUTO: 2.9 X10(3)/MCL (ref 0.6–4.6)
LYMPHOCYTES NFR BLD AUTO: 40.4 %
MCH RBC QN AUTO: 26.5 PG (ref 27–31)
MCHC RBC AUTO-ENTMCNC: 32.1 G/DL (ref 33–36)
MCV RBC AUTO: 82.7 FL (ref 80–94)
MONOCYTES # BLD AUTO: 0.5 X10(3)/MCL (ref 0.1–1.3)
MONOCYTES NFR BLD AUTO: 7 %
NEUTROPHILS # BLD AUTO: 3.63 X10(3)/MCL (ref 2.1–9.2)
NEUTROPHILS NFR BLD AUTO: 50.6 %
NRBC BLD AUTO-RTO: 0 %
PLATELET # BLD AUTO: 269 X10(3)/MCL (ref 130–400)
PMV BLD AUTO: 10.6 FL (ref 7.4–10.4)
POTASSIUM SERPL-SCNC: 4.1 MMOL/L (ref 3.5–5.1)
PROT SERPL-MCNC: 7.7 GM/DL (ref 6.4–8.3)
RBC # BLD AUTO: 4.75 X10(6)/MCL (ref 4.2–5.4)
SODIUM SERPL-SCNC: 140 MMOL/L (ref 136–145)
WBC # SPEC AUTO: 7.18 X10(3)/MCL (ref 4.5–11.5)

## 2024-05-13 PROCEDURE — 86147 CARDIOLIPIN ANTIBODY EA IG: CPT | Performed by: INTERNAL MEDICINE

## 2024-05-13 PROCEDURE — 86146 BETA-2 GLYCOPROTEIN ANTIBODY: CPT | Performed by: INTERNAL MEDICINE

## 2024-05-13 PROCEDURE — 1159F MED LIST DOCD IN RCRD: CPT | Mod: CPTII,,, | Performed by: INTERNAL MEDICINE

## 2024-05-13 PROCEDURE — 81240 F2 GENE: CPT | Performed by: INTERNAL MEDICINE

## 2024-05-13 PROCEDURE — 4010F ACE/ARB THERAPY RXD/TAKEN: CPT | Mod: CPTII,,, | Performed by: INTERNAL MEDICINE

## 2024-05-13 PROCEDURE — 85025 COMPLETE CBC W/AUTO DIFF WBC: CPT | Performed by: INTERNAL MEDICINE

## 2024-05-13 PROCEDURE — 85300 ANTITHROMBIN III ACTIVITY: CPT | Performed by: INTERNAL MEDICINE

## 2024-05-13 PROCEDURE — 3079F DIAST BP 80-89 MM HG: CPT | Mod: CPTII,,, | Performed by: INTERNAL MEDICINE

## 2024-05-13 PROCEDURE — 3075F SYST BP GE 130 - 139MM HG: CPT | Mod: CPTII,,, | Performed by: INTERNAL MEDICINE

## 2024-05-13 PROCEDURE — 88184 FLOWCYTOMETRY/ TC 1 MARKER: CPT

## 2024-05-13 PROCEDURE — 99205 OFFICE O/P NEW HI 60 MIN: CPT | Mod: S$PBB,,, | Performed by: INTERNAL MEDICINE

## 2024-05-13 PROCEDURE — 85306 CLOT INHIBIT PROT S FREE: CPT | Performed by: INTERNAL MEDICINE

## 2024-05-13 PROCEDURE — 36415 COLL VENOUS BLD VENIPUNCTURE: CPT | Performed by: INTERNAL MEDICINE

## 2024-05-13 PROCEDURE — 1160F RVW MEDS BY RX/DR IN RCRD: CPT | Mod: CPTII,,, | Performed by: INTERNAL MEDICINE

## 2024-05-13 PROCEDURE — 80053 COMPREHEN METABOLIC PANEL: CPT | Performed by: INTERNAL MEDICINE

## 2024-05-13 PROCEDURE — 81241 F5 GENE: CPT | Performed by: INTERNAL MEDICINE

## 2024-05-13 PROCEDURE — 88184 FLOWCYTOMETRY/ TC 1 MARKER: CPT | Performed by: INTERNAL MEDICINE

## 2024-05-13 PROCEDURE — 85303 CLOT INHIBIT PROT C ACTIVITY: CPT | Performed by: INTERNAL MEDICINE

## 2024-05-13 PROCEDURE — 3008F BODY MASS INDEX DOCD: CPT | Mod: CPTII,,, | Performed by: INTERNAL MEDICINE

## 2024-05-13 PROCEDURE — 88185 FLOWCYTOMETRY/TC ADD-ON: CPT

## 2024-05-13 PROCEDURE — 99215 OFFICE O/P EST HI 40 MIN: CPT | Mod: PBBFAC | Performed by: INTERNAL MEDICINE

## 2024-05-13 NOTE — Clinical Note
Check CBC, CMP Factor 5 Leiden mutation  Prothrombin gene mutation  Anticardiolipin antibody  Beta 2 glycoprotein 1 antibody  Protein C activity Free protein S antigen assay  Antithrombin 3 cofactor assay  [facility] screen Indefinite anticoagulation to continue Follow-up in 3 weeks, with labs

## 2024-05-15 LAB
AT III ACT/NOR PPP CHRO: 99 % (ref 80–130)
B2 GLYCOPROT1 IGG SERPL IA-ACNC: <9.4 SGU
B2 GLYCOPROT1 IGM SERPL IA-ACNC: <9.4 SMU
MAYO GENERIC ORDERABLE RESULT: NORMAL
PROT C ACT/NOR PPP CHRO: 143 % (ref 70–150)
PROT S FREE AG ACT/NOR PPP IA: 77 % (ref 65–160)

## 2024-05-16 LAB
CARDIOLIPIN IGG QUANT (OHS): 0.7 GPL U/ML
CARDIOLIPIN IGM QUANT (OHS): 1.7 MPL U/ML

## 2024-05-17 LAB
COAGULATION SPECIALIST REVIEW: NORMAL
COAGULATION SPECIALIST REVIEW: NORMAL
F2 C.20210G>A GENO BLD/T: NEGATIVE
F5 P.R506Q BLD/T QL: NEGATIVE
PROVIDER SIGNING NAME: NORMAL
PROVIDER SIGNING NAME: NORMAL

## 2024-05-24 ENCOUNTER — HOSPITAL ENCOUNTER (EMERGENCY)
Facility: HOSPITAL | Age: 54
Discharge: HOME | End: 2024-05-24
Attending: EMERGENCY MEDICINE
Payer: MEDICAID

## 2024-05-24 VITALS
SYSTOLIC BLOOD PRESSURE: 129 MMHG | HEART RATE: 92 BPM | TEMPERATURE: 98 F | OXYGEN SATURATION: 100 % | RESPIRATION RATE: 18 BRPM | DIASTOLIC BLOOD PRESSURE: 88 MMHG

## 2024-05-24 DIAGNOSIS — S93.492A SPRAIN OF ANTERIOR TALOFIBULAR LIGAMENT OF LEFT ANKLE, INITIAL ENCOUNTER: Primary | ICD-10-CM

## 2024-05-24 DIAGNOSIS — M79.89 LEFT LEG SWELLING: ICD-10-CM

## 2024-05-24 DIAGNOSIS — S99.922A FOOT INJURY, LEFT, INITIAL ENCOUNTER: ICD-10-CM

## 2024-05-24 DIAGNOSIS — S99.912A ANKLE INJURY, LEFT, INITIAL ENCOUNTER: ICD-10-CM

## 2024-05-24 PROCEDURE — 25000003 PHARM REV CODE 250: Performed by: EMERGENCY MEDICINE

## 2024-05-24 PROCEDURE — 99283 EMERGENCY DEPT VISIT LOW MDM: CPT | Mod: 25

## 2024-05-24 RX ORDER — ACETAMINOPHEN AND CODEINE PHOSPHATE 300; 30 MG/1; MG/1
1 TABLET ORAL EVERY 8 HOURS PRN
Qty: 15 TABLET | Refills: 0 | Status: SHIPPED | OUTPATIENT
Start: 2024-05-24 | End: 2024-06-03

## 2024-05-24 RX ORDER — ACETAMINOPHEN AND CODEINE PHOSPHATE 300; 30 MG/1; MG/1
1 TABLET ORAL
Status: COMPLETED | OUTPATIENT
Start: 2024-05-24 | End: 2024-05-24

## 2024-05-24 RX ORDER — PEN NEEDLE, DIABETIC 31 GX5/16"
NEEDLE, DISPOSABLE MISCELLANEOUS
Qty: 100 EACH | Refills: 0 | Status: SHIPPED | OUTPATIENT
Start: 2024-05-24

## 2024-05-24 RX ADMIN — ACETAMINOPHEN AND CODEINE PHOSPHATE 1 TABLET: 300; 30 TABLET ORAL at 12:05

## 2024-05-24 NOTE — ED NOTES
Pt presents to ED with C/O L ankle, top of L foot pain and swelling. Pt also C/O swelling to the L calf area. Pt states she slipped on her inside steps at midnight last night. Pt states the pain is 8/10 at this time. She denies taking any medication for the pain. She states she did ice the area. Pt given a pillow to elevate the foot and Ice.

## 2024-05-24 NOTE — DISCHARGE INSTRUCTIONS
Weight-bearing as tolerated.  Use crutches.  Elevate and ice.      Thank you for coming in to see us at [facility]! It was nice to meet you, and I hope you feel better soon. Please feel free to return to the ER at any time should your symptoms get worse, or if you have different emergent concerns.    Our goal in the emergency department is to always give you outstanding care and exceptional service. You may receive a survey by mail or e-mail in the next week regarding your experience in our ED. We would greatly appreciate your completing and returning the survey. Your feedback provides us with a way to recognize our staff who give very good care and it helps us learn how to improve when your experience was below our aspiration of excellence.       Sincerely,    Eyad Philippe MD  Medical Director  Emergency Department  [facility]

## 2024-05-24 NOTE — ED PROVIDER NOTES
"Encounter Date: 5/24/2024       History     Chief Complaint   Patient presents with    Ankle Pain     Pt states that she injured left ankle walking down steps around midnight. Pt presents with swelling noted to right ankle 10/10  Pt ambulatory with a  limp     HPI  This is a 54 y.o. female who presents to the Emergency Department with ankle injury last night around midnight when she was walking down the steps and missed a step.  Difficulty with ambulation and weight-bearing.  Improvement with rest.    States has history of DVTs and will be on blood thinners for the rest of her life.    Review of patient's allergies indicates:   Allergen Reactions    Corticosteroids (glucocorticoids) Swelling     Other reaction(s): reports allergic "to all steriods"     Past Medical History:   Diagnosis Date    Abnormal Pap smear of cervix     5 years ago    Asthma     Diabetes     DVT (deep venous thrombosis) 06/2018    left leg    Hypertension      Past Surgical History:   Procedure Laterality Date    HYSTERECTOMY      LAPAROSCOPIC CHOLECYSTECTOMY N/A 8/31/2018    Procedure: CHOLECYSTECTOMY, LAPAROSCOPIC;  Surgeon: Aki Watt MD;  Location: [facility] OR;  Service: General;  Laterality: N/A;  No PAT Available-Anes in AM  VIDEO    TONSILLECTOMY, ADENOIDECTOMY  06/2017    TUBAL LIGATION       No family history on file.  Social History     Tobacco Use    Smoking status: Never    Smokeless tobacco: Never   Substance Use Topics    Alcohol use: Yes    Drug use: No     Review of Systems    Physical Exam     Initial Vitals [05/24/24 1116]   BP Pulse Resp Temp SpO2   129/88 94 16 -- --      MAP       --         Physical Exam    Nursing note and vitals reviewed.  Constitutional: She appears well-developed and well-nourished. She is not diaphoretic. No distress.   HENT:   Head: Normocephalic and atraumatic.   Mouth/Throat: Oropharynx is clear and moist.   Eyes: Conjunctivae are normal.   Cardiovascular:  Normal rate, regular rhythm " and intact distal pulses.           Pulmonary/Chest: No respiratory distress.   Musculoskeletal:         General: Tenderness (Left lateral malleolus and medial malleolus.  No tenderness to the midfoot, distal foot or base of the 5th metatarsal.) and edema (1+ bilateral leg) present. Normal range of motion.     Neurological: She is alert and oriented to person, place, and time.   Skin: Skin is warm and dry. Capillary refill takes less than 2 seconds. No rash noted. No erythema.   Psychiatric: She has a normal mood and affect.         ED Course   Procedures  Labs Reviewed - No data to display       Imaging Results              X-Ray Ankle Complete Left (Preliminary result)  Result time 05/24/24 12:26:39      Wet Read by Eyad Philippe MD (05/24/24 12:26:39, [facility] Emergency Dept, Emergency Medicine)    No fracture or subluxation                                     X-Ray Foot Complete Left (Preliminary result)  Result time 05/24/24 12:26:44      Wet Read by Eyad Philippe MD (05/24/24 12:26:44, [facility] Emergency Dept, Emergency Medicine)    No fracture subluxation                                     Medications   acetaminophen-codeine 300-30mg per tablet 1 tablet (has no administration in time range)     Medical Decision Making  The patient appears to have an ankle sprain.  The patient's xrays show no signs of fracture, dislocation, or subluxation.  The patient could have a ligamentous injury, but the ankle doesn't appear to be unstable.  The patient will be discharged home to follow up with their physician or the doctor provided.  They will be treated with supportive care.    Differential diagnosis:  Ankle Sprain, strain, fracture, dislocation, subluxation      Problems Addressed:  Sprain of anterior talofibular ligament of left ankle, initial encounter: acute illness or injury    Amount and/or Complexity of Data Reviewed  External Data Reviewed: notes.     Details:     Clinic visits for lower extremity DVT and  "endometrial hyperplasia  Radiology: ordered and independent interpretation performed.     Details:     See above      Risk  OTC drugs.  Prescription drug management.  Diagnosis or treatment significantly limited by social determinants of health.  Risk Details:     Social determinants of health considered:  Access to healthcare including difficulty in obtaining follow-up and difficulty in obtaining medications; health literacy.                                Patient has ankle sprain.  Crutches, pain control, rest, ice, compression and elevation.      Clinical impression and plan including any treatment was discussed with patient.   Pt is to call for follow up with PCP or podiatry in 7 days.   Pt is to return to the ED immediately for any new or worsening symptoms.  They are always welcome to return for any emergent concerns.    The patient had time for ask questions to which they were addressed.   The patient expressed understanding and agrees with my plan.        Clinical Impression:  Final diagnoses:  [S99.922A] Foot injury, left, initial encounter  [M79.89] Left leg swelling  [S99.912A] Ankle injury, left, initial encounter  [S93.492A] Sprain of anterior talofibular ligament of left ankle, initial encounter (Primary)          ED Disposition Condition    Discharge Stable          ED Prescriptions       Medication Sig Dispense Start Date End Date Auth. Provider    BD ULTRA-FINE SHORT PEN NEEDLE 31 gauge x 5/16" Ndle USE AS DIRECTED to INJECT insulin 100 each 5/24/2024 -- Eyad Philippe MD    acetaminophen-codeine 300-30mg (TYLENOL #3) 300-30 mg Tab Take 1 tablet by mouth every 8 (eight) hours as needed (pain). 15 tablet 5/24/2024 6/3/2024 Eyad Philippe MD          Follow-up Information       Follow up With Specialties Details Why Contact Info    Thaddeus Rdz, [facility]   [address]  [address]  [facility] [id number] [phone]               Eyad Philippe MD  05/24/24 123    "

## 2024-05-24 NOTE — ED NOTES
Pt returned to ED via WC. Pt refused US and states ( I was seen x2 weeks ago and diagnosed with Blood clots and the doctor told me that I will be on them the rest of my life.) informed Dr. Philippe.

## 2024-05-24 NOTE — ED TRIAGE NOTES
Pt presents to eD today near fall   C/o LLE pain and swelling   Reports she was unable to take medication for pain   Occurred last night

## 2024-06-11 ENCOUNTER — TELEPHONE (OUTPATIENT)
Dept: HEMATOLOGY/ONCOLOGY | Facility: CLINIC | Age: 54
End: 2024-06-11
Payer: MEDICAID

## 2024-06-11 DIAGNOSIS — I82.432 ACUTE DEEP VEIN THROMBOSIS (DVT) OF POPLITEAL VEIN OF LEFT LOWER EXTREMITY: Primary | ICD-10-CM

## 2024-06-19 ENCOUNTER — TELEPHONE (OUTPATIENT)
Dept: HEMATOLOGY/ONCOLOGY | Facility: CLINIC | Age: 54
End: 2024-06-19
Payer: MEDICAID

## 2024-06-27 ENCOUNTER — TELEPHONE (OUTPATIENT)
Dept: HEMATOLOGY/ONCOLOGY | Facility: CLINIC | Age: 54
End: 2024-06-27
Payer: MEDICAID

## 2024-08-02 DIAGNOSIS — I82.432 ACUTE DEEP VEIN THROMBOSIS (DVT) OF POPLITEAL VEIN OF LEFT LOWER EXTREMITY: Primary | ICD-10-CM

## 2024-08-27 ENCOUNTER — TELEPHONE (OUTPATIENT)
Dept: HEMATOLOGY/ONCOLOGY | Facility: CLINIC | Age: 54
End: 2024-08-27
Payer: MEDICAID

## 2024-08-27 DIAGNOSIS — I82.432 ACUTE DEEP VEIN THROMBOSIS (DVT) OF POPLITEAL VEIN OF LEFT LOWER EXTREMITY: Primary | ICD-10-CM

## 2024-09-01 ENCOUNTER — HOSPITAL ENCOUNTER (EMERGENCY)
Facility: HOSPITAL | Age: 54
Discharge: HOME | End: 2024-09-01
Attending: EMERGENCY MEDICINE
Payer: MEDICAID

## 2024-09-01 VITALS
HEART RATE: 84 BPM | SYSTOLIC BLOOD PRESSURE: 141 MMHG | HEIGHT: 64 IN | TEMPERATURE: 98 F | OXYGEN SATURATION: 99 % | WEIGHT: 198 LBS | BODY MASS INDEX: 33.8 KG/M2 | DIASTOLIC BLOOD PRESSURE: 84 MMHG | RESPIRATION RATE: 16 BRPM

## 2024-09-01 DIAGNOSIS — R73.9 HYPERGLYCEMIA: ICD-10-CM

## 2024-09-01 DIAGNOSIS — R10.33 PERIUMBILICAL ABDOMINAL PAIN: Primary | ICD-10-CM

## 2024-09-01 DIAGNOSIS — M19.90 OSTEOARTHRITIS, UNSPECIFIED OSTEOARTHRITIS TYPE, UNSPECIFIED SITE: ICD-10-CM

## 2024-09-01 LAB
ALBUMIN SERPL BCP-MCNC: 3.4 G/DL (ref 3.5–5.2)
ALP SERPL-CCNC: 160 U/L (ref 55–135)
ALT SERPL W/O P-5'-P-CCNC: 16 U/L (ref 10–44)
ANION GAP SERPL CALC-SCNC: 11 MMOL/L (ref 8–16)
AST SERPL-CCNC: 18 U/L (ref 10–40)
B-OH-BUTYR BLD STRIP-SCNC: 0.2 MMOL/L (ref 0–0.5)
BACTERIA #/AREA URNS HPF: ABNORMAL /HPF
BASOPHILS # BLD AUTO: 0.04 K/UL (ref 0–0.2)
BASOPHILS NFR BLD: 0.7 % (ref 0–1.9)
BILIRUB SERPL-MCNC: 0.2 MG/DL (ref 0.1–1)
BILIRUB UR QL STRIP: NEGATIVE
BUN SERPL-MCNC: 20 MG/DL (ref 6–20)
CALCIUM SERPL-MCNC: 10.3 MG/DL (ref 8.7–10.5)
CHLORIDE SERPL-SCNC: 105 MMOL/L (ref 95–110)
CLARITY UR: ABNORMAL
CO2 SERPL-SCNC: 24 MMOL/L (ref 23–29)
COLOR UR: YELLOW
CREAT SERPL-MCNC: 1.4 MG/DL (ref 0.5–1.4)
DIFFERENTIAL METHOD BLD: ABNORMAL
EOSINOPHIL # BLD AUTO: 0.2 K/UL (ref 0–0.5)
EOSINOPHIL NFR BLD: 3.9 % (ref 0–8)
ERYTHROCYTE [DISTWIDTH] IN BLOOD BY AUTOMATED COUNT: 13.2 % (ref 11.5–14.5)
EST. GFR  (NO RACE VARIABLE): 44 ML/MIN/1.73 M^2
FIO2: 21 %
GLUCOSE SERPL-MCNC: 386 MG/DL (ref 70–110)
GLUCOSE UR QL STRIP: ABNORMAL
HCT VFR BLD AUTO: 41.7 % (ref 37–48.5)
HGB BLD-MCNC: 13.2 G/DL (ref 12–16)
HGB UR QL STRIP: NEGATIVE
IMM GRANULOCYTES # BLD AUTO: 0.01 K/UL (ref 0–0.04)
IMM GRANULOCYTES NFR BLD AUTO: 0.2 % (ref 0–0.5)
KETONES UR QL STRIP: NEGATIVE
LEUKOCYTE ESTERASE UR QL STRIP: ABNORMAL
LIPASE SERPL-CCNC: 114 U/L (ref 4–60)
LYMPHOCYTES # BLD AUTO: 2.7 K/UL (ref 1–4.8)
LYMPHOCYTES NFR BLD: 45.3 % (ref 18–48)
MCH RBC QN AUTO: 27.6 PG (ref 27–31)
MCHC RBC AUTO-ENTMCNC: 31.7 G/DL (ref 32–36)
MCV RBC AUTO: 87 FL (ref 82–98)
MICROSCOPIC COMMENT: ABNORMAL
MONOCYTES # BLD AUTO: 0.4 K/UL (ref 0.3–1)
MONOCYTES NFR BLD: 7.3 % (ref 4–15)
NEUTROPHILS # BLD AUTO: 2.5 K/UL (ref 1.8–7.7)
NEUTROPHILS NFR BLD: 42.6 % (ref 38–73)
NITRITE UR QL STRIP: NEGATIVE
NRBC BLD-RTO: 0 /100 WBC
PCO2 BLDA: 59.5 MMHG (ref 35–45)
PH SMN: 7.35 [PH] (ref 7.35–7.45)
PH UR STRIP: 6 [PH] (ref 5–8)
PLATELET # BLD AUTO: 291 K/UL (ref 150–450)
PMV BLD AUTO: 10.5 FL (ref 9.2–12.9)
PO2 BLDA: 14.4 MMHG (ref 40–60)
POC BASE DEFICIT: 5.4 MMOL/L (ref -2–2)
POC HCO3: 32.8 MMOL/L (ref 24–28)
POC PERFORMED BY: ABNORMAL
POC SATURATED O2: 17.6 % (ref 95–100)
POCT GLUCOSE: 211 MG/DL (ref 70–110)
POTASSIUM SERPL-SCNC: 4.5 MMOL/L (ref 3.5–5.1)
PROT SERPL-MCNC: 7.5 G/DL (ref 6–8.4)
PROT UR QL STRIP: NEGATIVE
RBC # BLD AUTO: 4.79 M/UL (ref 4–5.4)
RBC #/AREA URNS HPF: 13 /HPF (ref 0–4)
SODIUM SERPL-SCNC: 140 MMOL/L (ref 136–145)
SP GR UR STRIP: >1.03 (ref 1–1.03)
SPECIMEN SOURCE: ABNORMAL
SQUAMOUS #/AREA URNS HPF: 4 /HPF
URN SPEC COLLECT METH UR: ABNORMAL
UROBILINOGEN UR STRIP-ACNC: NEGATIVE EU/DL
WBC # BLD AUTO: 5.91 K/UL (ref 3.9–12.7)
WBC #/AREA URNS HPF: 46 /HPF (ref 0–5)
YEAST URNS QL MICRO: ABNORMAL

## 2024-09-01 PROCEDURE — 96361 HYDRATE IV INFUSION ADD-ON: CPT

## 2024-09-01 PROCEDURE — 25000003 PHARM REV CODE 250: Performed by: EMERGENCY MEDICINE

## 2024-09-01 PROCEDURE — 80053 COMPREHEN METABOLIC PANEL: CPT | Performed by: NURSE PRACTITIONER

## 2024-09-01 PROCEDURE — 81000 URINALYSIS NONAUTO W/SCOPE: CPT | Performed by: NURSE PRACTITIONER

## 2024-09-01 PROCEDURE — 96374 THER/PROPH/DIAG INJ IV PUSH: CPT

## 2024-09-01 PROCEDURE — 87086 URINE CULTURE/COLONY COUNT: CPT | Performed by: NURSE PRACTITIONER

## 2024-09-01 PROCEDURE — 96375 TX/PRO/DX INJ NEW DRUG ADDON: CPT

## 2024-09-01 PROCEDURE — 83690 ASSAY OF LIPASE: CPT | Performed by: NURSE PRACTITIONER

## 2024-09-01 PROCEDURE — 63600175 PHARM REV CODE 636 W HCPCS: Performed by: EMERGENCY MEDICINE

## 2024-09-01 PROCEDURE — 99900035 HC TECH TIME PER 15 MIN (STAT)

## 2024-09-01 PROCEDURE — 82803 BLOOD GASES ANY COMBINATION: CPT

## 2024-09-01 PROCEDURE — 82010 KETONE BODYS QUAN: CPT | Performed by: EMERGENCY MEDICINE

## 2024-09-01 PROCEDURE — 82962 GLUCOSE BLOOD TEST: CPT

## 2024-09-01 PROCEDURE — 99285 EMERGENCY DEPT VISIT HI MDM: CPT | Mod: 25

## 2024-09-01 PROCEDURE — 85025 COMPLETE CBC W/AUTO DIFF WBC: CPT | Performed by: NURSE PRACTITIONER

## 2024-09-01 RX ORDER — METOCLOPRAMIDE HYDROCHLORIDE 5 MG/ML
10 INJECTION INTRAMUSCULAR; INTRAVENOUS
Status: COMPLETED | OUTPATIENT
Start: 2024-09-01 | End: 2024-09-01

## 2024-09-01 RX ORDER — SODIUM CHLORIDE 9 MG/ML
1000 INJECTION, SOLUTION INTRAVENOUS
Status: COMPLETED | OUTPATIENT
Start: 2024-09-01 | End: 2024-09-01

## 2024-09-01 RX ORDER — DICYCLOMINE HYDROCHLORIDE 20 MG/1
20 TABLET ORAL 2 TIMES DAILY PRN
Qty: 20 TABLET | Refills: 0 | Status: SHIPPED | OUTPATIENT
Start: 2024-09-01 | End: 2024-10-01

## 2024-09-01 RX ORDER — METOCLOPRAMIDE 10 MG/1
10 TABLET ORAL EVERY 6 HOURS PRN
Qty: 30 TABLET | Refills: 0 | Status: SHIPPED | OUTPATIENT
Start: 2024-09-01

## 2024-09-01 RX ORDER — METHOCARBAMOL 500 MG/1
1000 TABLET, FILM COATED ORAL 2 TIMES DAILY PRN
Qty: 20 TABLET | Refills: 0 | Status: SHIPPED | OUTPATIENT
Start: 2024-09-01 | End: 2024-09-06

## 2024-09-01 RX ORDER — MORPHINE SULFATE 4 MG/ML
4 INJECTION, SOLUTION INTRAMUSCULAR; INTRAVENOUS
Status: COMPLETED | OUTPATIENT
Start: 2024-09-01 | End: 2024-09-01

## 2024-09-01 RX ADMIN — METOCLOPRAMIDE 10 MG: 5 INJECTION, SOLUTION INTRAMUSCULAR; INTRAVENOUS at 02:09

## 2024-09-01 RX ADMIN — SODIUM CHLORIDE 1000 ML: 9 INJECTION, SOLUTION INTRAVENOUS at 02:09

## 2024-09-01 RX ADMIN — MORPHINE SULFATE 4 MG: 4 INJECTION INTRAVENOUS at 03:09

## 2024-09-01 NOTE — ED NOTES
Patient identifiers verified and correct.      LOC: The patient is awake, alert and aware of environment with an appropriate affect, the patient is oriented x 4 and speaking appropriately.      APPEARANCE: Patient appears comfortable and in no acute distress, patient is clean and well groomed.     HEENT: Head symmetrical. Eyes bilateral.  Bilateral ears without drainage. Bilateral nares patent, throat clear.     SKIN: The skin is warm and dry, color consistent with ethnicity, patient has normal skin turgor and moist mucus membranes, skin intact, no breakdown or bruising noted.      MUSCULOSKELETAL: Patient moving all extremities spontaneously, no swelling noted. Reporting back pain.     Genitourinary: Patient denies any dysuria, bleeding, and discharge. Patient denies any reports of incontinence.      RESPIRATORY: Airway is open and patent, respirations are spontaneous, patient has a normal effort and rate, no accessory muscle use noted.      CARDIAC: Patient has a normal rate, no edema noted, capillary refill < 3 seconds.      GASTRO: Abdomen soft and non-distended. Reporting abdominal and pelvic pain.      NEURO: Pt opens eyes spontaneously pupils equal, round, and reactive. behavior appropriate to situation, follows commands, facial expression symmetrical,   bilateral hand grasp equal and even, purposeful motor response noted, normal sensation in all extremities when touched with a finger.     NEUROVASCULAR: All extremities are warm and pink.

## 2024-09-01 NOTE — ED PROVIDER NOTES
"Encounter Date: 9/1/2024       History     Chief Complaint   Patient presents with    Abdominal Pain    Back Pain     I'm having sharp pain in my stomach, every morning get up with pain in stomach points above naval and travel down to pelvic region  Fell last Saturday injured back, forgot meds were she lives     Patient is a 54-year-old female with a history of diabetes, asthma, hypertension, previous surgical history notable for hysterectomy, lap cholecystectomy who presents to the ED with complaint of abdominal pain and back pain.      Back pain:  Patient states she sustained a mechanical fall several weeks ago was treated at an outside hospital where she was prescribed pain medications and muscle relaxers.  She states she is visiting from [place], [place] and forgot these medications on a trip down [facility] and [place].  She reports continued pain to the lower lumbar region in the site of her previous injury.  She denies any fractures or other abnormality.  She denies any new features or concerning complaints.    Abdominal pain:  Patient reports several months of pain to the epigastric region described as stabbing and nonradiating.  She states it typically occurs in the morning and only last several hours.  She states that she was diagnosed with a umbilical hernia in the past and is concerned that might be getting worse.  She denies any overt chest pain shortness of breath but does report some nausea and vomiting after eating.  She denies any change in her bowel habits.      Review of patient's allergies indicates:   Allergen Reactions    Corticosteroids (glucocorticoids) Swelling     Other reaction(s): reports allergic "to all steriods"     Past Medical History:   Diagnosis Date    Abnormal Pap smear of cervix     5 years ago    Asthma     Diabetes     DVT (deep venous thrombosis) 06/2018    left leg    Hypertension      Past Surgical History:   Procedure Laterality Date    HYSTERECTOMY      LAPAROSCOPIC " CHOLECYSTECTOMY N/A 8/31/2018    Procedure: CHOLECYSTECTOMY, LAPAROSCOPIC;  Surgeon: Aki Watt MD;  Location: [facility] OR;  Service: General;  Laterality: N/A;  No PAT Available-Anes in AM  VIDEO    TONSILLECTOMY, ADENOIDECTOMY  06/2017    TUBAL LIGATION       No family history on file.  Social History     Tobacco Use    Smoking status: Never    Smokeless tobacco: Never   Substance Use Topics    Alcohol use: Yes    Drug use: No     Review of Systems   Constitutional:  Negative for chills, fatigue and fever.   Respiratory:  Negative for cough, shortness of breath and stridor.    Cardiovascular:  Negative for palpitations and leg swelling.   Gastrointestinal:  Positive for abdominal pain, nausea and vomiting.   Genitourinary:  Negative for dysuria, flank pain, frequency, vaginal bleeding, vaginal discharge and vaginal pain.   Musculoskeletal:  Positive for back pain (Status post accident several weeks ago no new features). Negative for joint swelling, neck pain and neck stiffness.   Neurological:  Negative for dizziness and headaches.       Physical Exam     Initial Vitals [09/01/24 1236]   BP Pulse Resp Temp SpO2   (!) 149/93 100 18 98.4 °F (36.9 °C) 97 %      MAP       --         Physical Exam    Nursing note and vitals reviewed.  Constitutional: She appears well-developed and well-nourished. No distress.   Well-appearing  No acute distress    HENT:   Head: Normocephalic and atraumatic.   Eyes: EOM are normal. Pupils are equal, round, and reactive to light.   Neck: Neck supple.   Normal range of motion.  Cardiovascular:  Normal rate, regular rhythm and normal heart sounds.           Pulmonary/Chest: Breath sounds normal.   Abdominal: Abdomen is soft. Bowel sounds are normal.   Obese abdomen.  Soft.  Non distended.  Well healed vertical incision inferior to the umbilicus  Normal bowel sounds   Non tender to deep palpation  No rebound  No guarding  No peritoneal signs appreciated on examination     Musculoskeletal:         General: No tenderness or edema. Normal range of motion.      Cervical back: Normal range of motion and neck supple.     Neurological: She is alert and oriented to person, place, and time. She has normal strength. GCS score is 15. GCS eye subscore is 4. GCS verbal subscore is 5. GCS motor subscore is 6.   Skin: Capillary refill takes less than 2 seconds.         ED Course   Procedures  Labs Reviewed   CBC W/ AUTO DIFFERENTIAL - Abnormal       Result Value    WBC 5.91      RBC 4.79      Hemoglobin 13.2      Hematocrit 41.7      MCV 87      MCH 27.6      MCHC 31.7 (*)     RDW 13.2      Platelets 291      MPV 10.5      Immature Granulocytes 0.2      Gran # (ANC) 2.5      Immature Grans (Abs) 0.01      Lymph # 2.7      Mono # 0.4      Eos # 0.2      Baso # 0.04      nRBC 0      Gran % 42.6      Lymph % 45.3      Mono % 7.3      Eosinophil % 3.9      Basophil % 0.7      Differential Method Automated     COMPREHENSIVE METABOLIC PANEL - Abnormal    Sodium 140      Potassium 4.5      Chloride 105      CO2 24      Glucose 386 (*)     BUN 20      Creatinine 1.4      Calcium 10.3      Total Protein 7.5      Albumin 3.4 (*)     Total Bilirubin 0.2      Alkaline Phosphatase 160 (*)     AST 18      ALT 16      eGFR 44 (*)     Anion Gap 11     LIPASE - Abnormal    Lipase 114 (*)    URINALYSIS, REFLEX TO URINE CULTURE - Abnormal    Specimen UA Urine, Clean Catch      Color, UA Yellow      Appearance, UA Hazy (*)     pH, UA 6.0      Specific Gravity, UA >1.030 (*)     Protein, UA Negative      Glucose, UA 4+ (*)     Ketones, UA Negative      Bilirubin (UA) Negative      Occult Blood UA Negative      Nitrite, UA Negative      Urobilinogen, UA Negative      Leukocytes, UA Trace (*)     Narrative:     Specimen Source->Urine   URINALYSIS MICROSCOPIC - Abnormal    RBC, UA 13 (*)     WBC, UA 46 (*)     Bacteria Rare      Yeast, UA None      Squam Epithel, UA 4      Microscopic Comment SEE COMMENT      Narrative:      Specimen Source->Urine   POCT GLUCOSE - Abnormal    POCT Glucose 211 (*)    CULTURE, URINE   BETA - HYDROXYBUTYRATE, SERUM    Beta-Hydroxybutyrate 0.2     POCT GLUCOSE MONITORING CONTINUOUS          Imaging Results              CT Abdomen Pelvis  Without Contrast (Final result)  Result time 09/01/24 15:45:01      Final result by Willy Green MD (09/01/24 15:45:01)                   Impression:      No acute intra-abdominal or pelvic process.    Changes of renal medullary nephrocalcinosis.    Changes of prior cholecystectomy.    Degenerative changes in the spine.      Electronically signed by: Willy Green MD  Date:    09/01/2024  Time:    15:45               Narrative:    EXAMINATION:  CT ABDOMEN PELVIS WITHOUT CONTRAST    CLINICAL HISTORY:  Nausea/vomiting;    TECHNIQUE:  Axial CT scan of the abdomen and pelvis was obtained from the level of the hemidiaphragms to the pubic symphysis without intravenous contrast. Coronal and sagittal reformats were obtained.    COMPARISON:  CT dated 01/23/2024.    FINDINGS:  There are no pleural effusions.  There is no evidence of a pneumothorax.  No airspace opacity is present.    The heart is unremarkable.  There is normal tapering of the abdominal aorta.  No significant calcifications along the course of the abdominal aorta or its branch vessels.    There is no evidence of lymphadenopathy in the abdomen or pelvis.    The esophagus, stomach, and duodenum are within normal limits.  The small bowel loops are unremarkable.  The appendix is within normal limits.  There is colonic diverticula without evidence of acute diverticulitis.    The liver is unremarkable.  The patient is status post cholecystectomy.  The biliary tree is within normal limits.  The spleen is unremarkable.  The pancreas is within normal limits.  The adrenal glands are unremarkable.    There is unchanged appearance of renal medullary nephrocalcinosis.  The ureters and urinary bladder are unremarkable.   Previous linear radiopaque density in the superior aspect of the uterus is not located in a more inferior location.  The adnexal structures are within normal limits.    There is no evidence of free fluid the abdomen or pelvis.  There is no evidence of free air.  There is no evidence of pneumatosis.  No portal venous air is identified.    The psoas margins are unremarkable.  There is an umbilical hernia containing omental fat.  The remaining abdominal wall is unremarkable.  There are degenerative changes in the osseous structures.  There are calcifications within the posterior aspect of the disc space in the lower thoracic spine.  No evidence of a fracture.                                       Medications   0.9%  NaCl infusion (1,000 mLs Intravenous New Bag 9/1/24 1420)   metoclopramide injection 10 mg (10 mg Intravenous Given 9/1/24 1432)   morphine injection 4 mg (4 mg Intravenous Given 9/1/24 1518)     Medical Decision Making  Amount and/or Complexity of Data Reviewed  Labs:  Decision-making details documented in ED Course.  Radiology: ordered. Decision-making details documented in ED Course.    Risk  Prescription drug management.               ED Course as of 09/01/24 1559   Sun Sep 01, 2024   1320 CBC W/ AUTO DIFFERENTIAL(!) [DT]   1352 Leukocyte Esterase, UA(!): Trace [LC]   1352 Glucose, UA(!): 4+ [LC]   1352 Bacteria, UA: Rare [LC]   1352 Glucose(!): 386 [LC]   1352 WBC, UA(!): 46 [LC]   1352 RBC, UA(!): 13 [LC]   1352 Lipase(!): 114 [LC]   1428 Glucose(!): 386 [LC]   1430 POC PH(!): 7.350  Normal pH on venous blood gas.  [LC]   1430 Glucose, UA(!): 4+ [LC]   1430 Glucose(!): 386 [LC]   1430 Ketones, UA: Negative  No ketones in urine.  [LC]   1431 Glucose(!): 386  Blood glucose > 250.  [LC]   1431 WBC, UA(!): 46 [LC]   1431 RBC, UA(!): 13 [LC]   1431 Bacteria, UA: Rare  Rare bacteria on UA.  [LC]   1438 Beta-Hydroxybutyrate: 0.2  Patient informed that her blood glucose is greater than 350.  She states her  "blood sugar increases "when I am in pain."  [LC]   1454 Spec Grav UA(!): >1.030 [LC]   1455 Leukocyte Esterase, UA(!): Trace [LC]   1548 CT Abdomen Pelvis  Without Contrast  No acute intra-abdominal or pelvic process.     Changes of renal medullary nephrocalcinosis.     Changes of prior cholecystectomy.     Degenerative changes in the spine.      [LC]   1558 Leukocyte Esterase, UA(!): Trace [LC]   1558 Glucose, UA(!): 4+ [LC]   1558 RBC, UA(!): 13 [LC]   1558 WBC, UA(!): 46 [LC]      ED Course User Index  [DT] Katie Lynch, NP  [LC] Reji Denny MD               Medical Decision Making:   Initial Assessment:   See HPI   Clinical Tests:   Lab Tests: Ordered and Reviewed  Radiological Study: Ordered and Reviewed  ED Management:  - routine laboratory analysis notable for hyperglycemia but no findings of so-called diabetic ketoacidosis; UA with wbc's but no bacteria; trace leuks; patient denies any dysuria  - CT abdomen pelvis without contrast demonstrates no acute intra-abdominal abnormality per the final radiology read  - patient reports improvement of pain following morphine Reglan and IV fluids patient able tolerate p.o. without difficulty  - no emergent intervention indicated this time so will discharge home with prescription for Reglan, Bentyl; patient does have degenerative disc disease and does complain of lower back pain; no so-called red flags associated with the; will discharge home with prescription for Robaxin  - No further intervention is indicated at this time after having taken into account the patient's history, physical exam findings, and empirical and objective data obtained during the patient's emergency department workup.   - The patient is at low risk for an emergent medical condition at this time, and I am of the belief that that it is safe to discharge the patient from the emergency department.   - The patient is instructed to follow up as outpatient as indicated on the discharge " paperwork.    - I have discussed the specifics of the workup with the patient and the patient has verbalized understanding of the details of the workup, the diagnosis, the treatment plan, and the need for outpatient follow-up.    - Although the patient has no emergent etiology today this does not preclude the development of an emergent condition so, in addition, I have advised the patient that they can return to the ED and/or activate EMS at any time with worsening of their symptoms, change of their symptoms, or with any other medical complaint.    - The patient remained comfortable and stable during their visit in the ED.    - Discharge and follow-up instructions discussed with the patient who expressed understanding and willingness to comply with my recommendations.  - Results of all emergency department tests  discussed thoroughly with patient; all patient questions answered; pt in agreement with plan  - Pt instructed to follow up with PCP in 2-3 days for recheck of today's complaints  - Pt given strict emergency department return precautions for any new or worsening of symptoms  - Pt discharged from the emergency department in stable condition, in no acute distress                Clinical Impression:  Final diagnoses:  [R73.9] Hyperglycemia  [R10.33] Periumbilical abdominal pain (Primary)  [M19.90] Osteoarthritis, unspecified osteoarthritis type, unspecified site          ED Disposition Condition    Discharge Stable          ED Prescriptions       Medication Sig Dispense Start Date End Date Auth. Provider    metoclopramide HCl (REGLAN) 10 MG tablet Take 1 tablet (10 mg total) by mouth every 6 (six) hours as needed (nausea). 30 tablet 9/1/2024 -- Reji Denny MD    dicyclomine (BENTYL) 20 mg tablet Take 1 tablet (20 mg total) by mouth 2 (two) times daily as needed (abdominal cramping). 20 tablet 9/1/2024 10/1/2024 Reji Denny MD    methocarbamoL (ROBAXIN) 500 MG Tab Take 2 tablets (1,000 mg  total) by mouth 2 (two) times daily as needed (muscle pain). 20 tablet 9/1/2024 9/6/2024 Reji Denny MD          Follow-up Information    None          Reji Denny MD  09/01/24 1600

## 2024-09-01 NOTE — ED NOTES
Pt discharged home in care of self. Discharge instructions given. Medications and strict return to ED instructions discussed. Pt verbalized understanding. VSS.

## 2024-09-01 NOTE — FIRST PROVIDER EVALUATION
" Emergency Department TeleTriage Encounter Note      CHIEF COMPLAINT    Chief Complaint   Patient presents with    Abdominal Pain    Back Pain     I'm having sharp pain in my stomach, every morning get up with pain in stomach points above naval and travel down to pelvic region  Fell last Saturday injured back, forgot meds were she lives       VITAL SIGNS   Initial Vitals [09/01/24 1236]   BP Pulse Resp Temp SpO2   (!) 149/93 100 18 98.4 °F (36.9 °C) 97 %      MAP       --            ALLERGIES    Review of patient's allergies indicates:   Allergen Reactions    Corticosteroids (glucocorticoids) Swelling     Other reaction(s): reports allergic "to all steriods"       PROVIDER TRIAGE NOTE  Verbal consent for the teletriage evaluation was given by the patient at the start of the evaluation.  All efforts will be made to maintain patient's privacy during the evaluation.      This is a teletriage evaluation of a 54 y.o. female presenting to the ED with c/o abdominal pain for several days. Also reports nausea and vomiting.  Fell and injured back last week as well.  Limited physical exam via telehealth: The patient is awake, alert, answering questions appropriately and is not in respiratory distress.  As the Teletriage provider, I performed an initial assessment and ordered appropriate labs and imaging studies, if any, to facilitate the patient's care once placed in the ED. Once a room is available, care and a full evaluation will be completed by an alternate ED provider.  Any additional orders and the final disposition will be determined by that provider.  All imaging and labs will not be followed-up by the Teletriage Team, including myself.          ORDERS  Labs Reviewed   CBC W/ AUTO DIFFERENTIAL   COMPREHENSIVE METABOLIC PANEL   LIPASE   URINALYSIS, REFLEX TO URINE CULTURE       ED Orders (720h ago, onward)      Start Ordered     Status Ordering Provider    09/01/24 1246 09/01/24 1245  Vital signs  Every 2 hours         " Ordered ZEUS OLIVIER    09/01/24 1245 09/01/24 1245  Diet NPO  Diet effective now         Ordered ZEUS OLIVIER    09/01/24 1245 09/01/24 1245  Insert peripheral IV  Once         Ordered ZEUS OLIVIER    09/01/24 1245 09/01/24 1245  CBC W/ AUTO DIFFERENTIAL  STAT         Ordered ZEUS OLIVIER    09/01/24 1245 09/01/24 1245  Comp. Metabolic Panel  STAT         Ordered ZEUS OLIVIER    09/01/24 1245 09/01/24 1245  Lipase  STAT         Ordered ZEUS OLIVIER    09/01/24 1245 09/01/24 1245  Urinalysis, Reflex to Urine Culture Urine, Clean Catch  STAT         Ordered ZEUS OLIVIER              Virtual Visit Note: The provider triage portion of this emergency department evaluation and documentation was performed via [email or web address], a HIPAA-compliant telemedicine application, in concert with a tele-presenter in the room. A face to face patient evaluation with one of my colleagues will occur once the patient is placed in an emergency department room.      DISCLAIMER: This note was prepared with [email or web address] voice recognition transcription software. Garbled syntax, mangled pronouns, and other bizarre constructions may be attributed to that software system.

## 2024-09-02 LAB
BACTERIA UR CULT: NORMAL
BACTERIA UR CULT: NORMAL

## 2024-09-16 ENCOUNTER — TELEPHONE (OUTPATIENT)
Dept: HEMATOLOGY/ONCOLOGY | Facility: CLINIC | Age: 54
End: 2024-09-16
Payer: MEDICAID

## 2024-09-16 DIAGNOSIS — I82.432 ACUTE DEEP VEIN THROMBOSIS (DVT) OF POPLITEAL VEIN OF LEFT LOWER EXTREMITY: Primary | ICD-10-CM

## 2024-10-02 ENCOUNTER — TELEPHONE (OUTPATIENT)
Dept: HEMATOLOGY/ONCOLOGY | Facility: CLINIC | Age: 54
End: 2024-10-02
Payer: MEDICAID

## 2024-10-02 DIAGNOSIS — I82.432 ACUTE DEEP VEIN THROMBOSIS (DVT) OF POPLITEAL VEIN OF LEFT LOWER EXTREMITY: ICD-10-CM

## 2024-10-02 DIAGNOSIS — I82.451 ACUTE DEEP VEIN THROMBOSIS (DVT) OF RIGHT PERONEAL VEIN: Primary | ICD-10-CM

## 2024-10-02 NOTE — TELEPHONE ENCOUNTER
Called patient to confirm appointment for 10/3/24. Patient stated she slipped and fell hurt her back. She is in therapy right now and will be done on the 10th. Patient requested to reschedule appointment.Informed patient the  will contact her with a new appointment date and time. Patient verbalized understanding.

## 2024-10-11 RX ORDER — GABAPENTIN 800 MG/1
800 TABLET ORAL NIGHTLY
COMMUNITY

## 2024-10-15 ENCOUNTER — TELEPHONE (OUTPATIENT)
Dept: HEMATOLOGY/ONCOLOGY | Facility: CLINIC | Age: 54
End: 2024-10-15
Payer: MEDICAID

## 2024-10-15 DIAGNOSIS — I82.451 ACUTE DEEP VEIN THROMBOSIS (DVT) OF RIGHT PERONEAL VEIN: Primary | ICD-10-CM

## 2024-10-16 ENCOUNTER — TELEPHONE (OUTPATIENT)
Dept: HEMATOLOGY/ONCOLOGY | Facility: CLINIC | Age: 54
End: 2024-10-16
Payer: MEDICAID

## 2024-10-16 ENCOUNTER — HOSPITAL ENCOUNTER (EMERGENCY)
Facility: HOSPITAL | Age: 54
Discharge: HOME | End: 2024-10-16
Attending: EMERGENCY MEDICINE
Payer: MEDICAID

## 2024-10-16 VITALS
HEART RATE: 91 BPM | SYSTOLIC BLOOD PRESSURE: 138 MMHG | WEIGHT: 183 LBS | DIASTOLIC BLOOD PRESSURE: 89 MMHG | BODY MASS INDEX: 31.24 KG/M2 | OXYGEN SATURATION: 98 % | TEMPERATURE: 98 F | RESPIRATION RATE: 18 BRPM | HEIGHT: 64 IN

## 2024-10-16 DIAGNOSIS — M54.41 ACUTE MIDLINE LOW BACK PAIN WITH RIGHT-SIDED SCIATICA: Primary | ICD-10-CM

## 2024-10-16 PROCEDURE — 99284 EMERGENCY DEPT VISIT MOD MDM: CPT | Mod: 25

## 2024-10-16 PROCEDURE — 25000003 PHARM REV CODE 250: Performed by: NURSE PRACTITIONER

## 2024-10-16 RX ORDER — HYDROCODONE BITARTRATE AND ACETAMINOPHEN 5; 325 MG/1; MG/1
1 TABLET ORAL EVERY 6 HOURS PRN
Qty: 12 TABLET | Refills: 0 | Status: SHIPPED | OUTPATIENT
Start: 2024-10-16

## 2024-10-16 RX ORDER — HYDROCODONE BITARTRATE AND ACETAMINOPHEN 7.5; 325 MG/1; MG/1
1 TABLET ORAL EVERY 6 HOURS PRN
Status: DISCONTINUED | OUTPATIENT
Start: 2024-10-16 | End: 2024-10-16 | Stop reason: HOSPADM

## 2024-10-16 RX ADMIN — HYDROCODONE BITARTRATE AND ACETAMINOPHEN 1 TABLET: 7.5; 325 TABLET ORAL at 03:10

## 2024-10-16 NOTE — ED PROVIDER NOTES
"Encounter Date: 10/16/2024       History     Chief Complaint   Patient presents with    Back Pain     Patient reports to the ed c/o lower back pain (x)2 weeks. Rates pain 10/10 and ambulates w/o assistance. Also states she currently takes (3) muscle relaxers with no relief. Vss. nadn     Lower back pain for the last 2-3 weeks.  She stated it was worse with movement.  She injured herself after falling on her behind while roller-skating.  She fell getting up and felt a crack on her lower back.  Pain is rated 10/10.  She is taking Flexeril without relief.    The history is provided by the patient. No  was used.     Review of patient's allergies indicates:   Allergen Reactions    Corticosteroids (glucocorticoids) Swelling     Other reaction(s): reports allergic "to all steriods"     Past Medical History:   Diagnosis Date    Abnormal Pap smear of cervix     5 years ago    Asthma     Depression     Diabetes     DVT (deep venous thrombosis) 06/2018    left leg    Excessive or frequent menstruation     Hemorrhage in uterus     History of kidney stones     HLD (hyperlipidemia)     Hypertension     Lower abdominal pain     Neuropathy     Obesity      Past Surgical History:   Procedure Laterality Date    COLONOSCOPY      ESOPHAGOGASTRODUODENOSCOPY      HYSTEROSCOPY      LAPAROSCOPIC CHOLECYSTECTOMY N/A 08/31/2018    Procedure: CHOLECYSTECTOMY, LAPAROSCOPIC;  Surgeon: Aki Watt MD;  Location: [facility] OR;  Service: General;  Laterality: N/A;  No PAT Available-Anes in AM  VIDEO    LITHOTRIPSY      TONSILLECTOMY, ADENOIDECTOMY  06/2017    TUBAL LIGATION       No family history on file.  Social History     Tobacco Use    Smoking status: Never    Smokeless tobacco: Never   Substance Use Topics    Alcohol use: Not Currently    Drug use: No     Review of Systems   Constitutional:  Negative for fever.   HENT:  Negative for sore throat.    Respiratory:  Negative for shortness of breath.    Cardiovascular:  " Negative for chest pain.   Gastrointestinal:  Negative for nausea.   Genitourinary:  Negative for dysuria.   Musculoskeletal:  Positive for back pain.   Skin:  Negative for rash.   Neurological:  Negative for weakness.   Hematological:  Does not bruise/bleed easily.       Physical Exam     Initial Vitals [10/16/24 1337]   BP Pulse Resp Temp SpO2   (!) 141/91 94 18 97.8 °F (36.6 °C) 98 %      MAP       --         Physical Exam    Nursing note and vitals reviewed.  Constitutional: She appears well-developed and well-nourished.   HENT:   Head: Normocephalic and atraumatic.   Eyes: Conjunctivae and EOM are normal. Pupils are equal, round, and reactive to light.   Neck: Neck supple.   Normal range of motion.  Cardiovascular:  Normal rate and regular rhythm.           Pulmonary/Chest: Breath sounds normal. No respiratory distress.   Abdominal: Abdomen is soft. Bowel sounds are normal. She exhibits no distension. There is no abdominal tenderness.   Musculoskeletal:         General: No edema. Normal range of motion.      Cervical back: Normal range of motion and neck supple.        Back:         Legs:      Neurological: She is alert and oriented to person, place, and time. She has normal strength.   Skin: Skin is warm and dry.   Psychiatric: Thought content normal.         ED Course   Procedures  Labs Reviewed - No data to display       Imaging Results              CT Thoracic Spine Without Contrast (Final result)  Result time 10/16/24 16:37:52      Final result by John Moreno MD (10/16/24 16:37:52)                   Impression:      1. No acute fracture or malalignment identified.    2. Mild degenerative changes.      Electronically signed by: John Moreno  Date:    10/16/2024  Time:    16:37               Narrative:    EXAMINATION:  CT THORACIC SPINE WITHOUT CONTRAST    CLINICAL HISTORY:  Compression fracture, thoracic;    TECHNIQUE:  Multidetector axial images were performed of the thoracic spine without  administration of contrast images were reconstructed.    Dose length product was 1126 mGycm. Automated radiation control was utilized to minimize radiation dose.    COMPARISON:  Thoracic spine radiographs October 16, 2024    FINDINGS:  Thoracic vertebrae stature is preserved and the alignment is intact.  No fracture or malalignment identified.  There is some dorsal epidural lipomatosis throughout.    At T1-T2 there is there are anterior bridging osteophytes.    T11-T12, there is osteophyte disc complex which indents the ventral thecal sac without apparent cord compression.    At T12-L1, there is also osteophyte disc complex which indents and flattens the ventral thecal sac without cord compression.    Bilateral kidneys are remarkable for extensive medullary calcinosis as seen on prior CT abdomen September 1, 2024.                                       X-Ray Thoracic Spine AP Lateral (Final result)  Result time 10/16/24 15:17:21      Final result by Alli Kinsey MD (10/16/24 15:17:21)                   Impression:      No acute fracture or listhesis.      Electronically signed by: Alli Kinsey  Date:    10/16/2024  Time:    15:17               Narrative:    EXAMINATION:  XR THORACIC SPINE AP LATERAL    CLINICAL HISTORY:  back pain;    TECHNIQUE:  AP and lateral views of the thoracic spine were performed.    COMPARISON:  None    FINDINGS:  Thoracic spine is normal alignment.  The vertebral body heights and intervertebral disc spaces are well maintained.  No evidence of acute fracture or listhesis.  The visualized lungs are clear.  The cardiac silhouette is normal.                                       X-Ray Lumbar Spine Ap And Lateral (Final result)  Result time 10/16/24 15:16:59      Final result by Alli Kinsey MD (10/16/24 15:16:59)                   Impression:      No acute osseous findings.  Degenerative changes as described in the body of the report.  .      Electronically signed by: Alli  Tio  Date:    10/16/2024  Time:    15:16               Narrative:    EXAMINATION:  XR LUMBAR SPINE AP AND LATERAL    CLINICAL HISTORY:  low back pain;    TECHNIQUE:  AP and lateral views of the lumbar spine were performed.    COMPARISON:  12/7/22    FINDINGS:  There are 5 lumbar vertebrae identified.    Normal alignment the lumbar spine.    Soft tissues are unremarkable.    No acute fracture.  Stable mild grade 1 retrolisthesis of L5 on S1.    Mild lower lumbar facet arthropathy and mild multi level marginal endplate osteophytes.    Mild L5-S1 disc height loss.    The remaining intervertebral disc spaces are well maintained.    The vertebral body heights are well maintained.                        Wet Read by Christy Miller FNP (10/16/24 15:07:31, [facility] - Emergency Dept, Emergency Medicine)    Mild T10 compression fracture                                     Medications - No data to display    Medical Decision Making  Lower back pain for the last 2-3 weeks.  She stated it was worse with movement.  She injured herself after falling on her behind while roller-skating.  She fell getting up and felt a crack on her lower back.  Pain is rated 10/10.  She is taking Flexeril without relief.    CT without evidence of fracture. Dengenerative changes shown, with grade 1 retrolisthesis of L5 on S1. Norco given. Stretch info given. Follow up with PCP. ER precautions given.     Amount and/or Complexity of Data Reviewed  Radiology: ordered and independent interpretation performed. Decision-making details documented in ED Course.    Risk  Prescription drug management.  Risk Details: Risk Details: Given strict ED return precautions. I have spoken with the patient and/or caregivers. I have explained the patient's condition, diagnoses and treatment plan based on the information available to me at this time. I have answered the patient's and/or caregiver's questions and addressed any concerns. The patient and/or  caregivers have as good an understanding of the patient's diagnosis, condition and treatment plan as can be expected at this point. The vital signs have been stable. The patient's condition is stable and appropriate for discharge from the emergency department.      The patient will pursue further outpatient evaluation with the primary care physician or other designated or consulting physician as outlined in the discharge instructions. The patient and/or caregivers are agreeable to this plan of care and follow-up instructions have been explained in detail. The patient and/or caregivers have received these instructions in written format and have expressed an understanding of the discharge instructions. The patient and/or caregivers are aware that any significant change in condition or worsening of symptoms should prompt an immediate return to this or the closest emergency department or a call to 911.           Additional MDM:   Differential Diagnosis:   Lumbar fracture, spinal stenosis, epidural abscess, spine osteomyelitis, MS, cauda equina, among others                          It is important that you follow up with your primary care provider or specialist if indicated for further evaluation, workup, and treatment as necessary. The exam and treatment you received in Emergency Department was for an urgent problem and NOT INTENDED AS COMPLETE CARE. It is important that you FOLLOW UP with a doctor for ongoing care. If your symptoms become WORSE or you DO NOT IMPROVE and you are unable to reach your health care provider, you should RETURN to the Emergency Department             Clinical Impression:  Final diagnoses:  [M54.41] Acute midline low back pain with right-sided sciatica (Primary)          ED Disposition Condition    Discharge Stable          ED Prescriptions       Medication Sig Dispense Start Date End Date Auth. Provider    HYDROcodone-acetaminophen (NORCO) 5-325 mg per tablet Take 1 tablet by mouth every 6  (six) hours as needed for Pain. 12 tablet 10/16/2024 -- Christy Miller, NATALIA          Follow-up Information       Follow up With Specialties Details Why Contact Info    follow up with PCP in 3-5 days  Schedule an appointment as soon as possible for a visit in 1 week If symptoms worsen, As needed              Christy Miller FNP  10/16/24 2042

## 2024-10-17 ENCOUNTER — LAB VISIT (OUTPATIENT)
Dept: LAB | Facility: HOSPITAL | Age: 54
End: 2024-10-17
Attending: OBSTETRICS & GYNECOLOGY
Payer: MEDICAID

## 2024-10-17 DIAGNOSIS — Z01.812 PRE-OPERATIVE LABORATORY EXAMINATION: Primary | ICD-10-CM

## 2024-10-17 LAB
ABORH RETYPE: NORMAL
ANION GAP SERPL CALC-SCNC: 9 MEQ/L
BUN SERPL-MCNC: 13.6 MG/DL (ref 9.8–20.1)
CALCIUM SERPL-MCNC: 9.1 MG/DL (ref 8.4–10.2)
CHLORIDE SERPL-SCNC: 107 MMOL/L (ref 98–107)
CO2 SERPL-SCNC: 25 MMOL/L (ref 22–29)
CREAT SERPL-MCNC: 1.06 MG/DL (ref 0.55–1.02)
CREAT/UREA NIT SERPL: 13
ERYTHROCYTE [DISTWIDTH] IN BLOOD BY AUTOMATED COUNT: 13.6 % (ref 11.5–17)
GFR SERPLBLD CREATININE-BSD FMLA CKD-EPI: >60 ML/MIN/1.73/M2
GLUCOSE SERPL-MCNC: 123 MG/DL (ref 74–100)
GROUP & RH: NORMAL
HCT VFR BLD AUTO: 40.5 % (ref 37–47)
HGB BLD-MCNC: 12.8 G/DL (ref 12–16)
INDIRECT COOMBS: NORMAL
MCH RBC QN AUTO: 27.8 PG (ref 27–31)
MCHC RBC AUTO-ENTMCNC: 31.6 G/DL (ref 33–36)
MCV RBC AUTO: 88 FL (ref 80–94)
NRBC BLD AUTO-RTO: 0 %
PLATELET # BLD AUTO: 273 X10(3)/MCL (ref 130–400)
PMV BLD AUTO: 10 FL (ref 7.4–10.4)
POTASSIUM SERPL-SCNC: 4.1 MMOL/L (ref 3.5–5.1)
RBC # BLD AUTO: 4.6 X10(6)/MCL (ref 4.2–5.4)
SODIUM SERPL-SCNC: 141 MMOL/L (ref 136–145)
SPECIMEN OUTDATE: NORMAL
WBC # BLD AUTO: 5.92 X10(3)/MCL (ref 4.5–11.5)

## 2024-10-17 PROCEDURE — 86901 BLOOD TYPING SEROLOGIC RH(D): CPT | Performed by: OBSTETRICS & GYNECOLOGY

## 2024-10-17 PROCEDURE — 85027 COMPLETE CBC AUTOMATED: CPT

## 2024-10-17 PROCEDURE — 80048 BASIC METABOLIC PNL TOTAL CA: CPT

## 2024-10-17 PROCEDURE — 36415 COLL VENOUS BLD VENIPUNCTURE: CPT | Mod: 91

## 2024-10-23 NOTE — PRE-PROCEDURE INSTRUCTIONS
"[facility]: Outpatient Surgery  Preprocedure Check-In Instructions     Your arrival time for your surgery or procedure is _9:30_____.  We ask patients to arrive about 2 hours before surgery to allow for enough time to review your health history & medications, start your IV, complete any outstanding labwork or tests, and meet your Anesthesiologist.    Expectations: "Because of inconsistent procedure completion times, an unexpected wait may occur. The Physicians would like you to be here to prepare in the event they run ahead of time. We will make you as comfortable as possible and keep you informed. We apologize in advance if this happens."    You will arrive at [facility], [address], [place].  Enter through the [facility] entrance next to the Emergency Room, and come to the 6th floor to the Outpatient Surgery Department.     Visitory Policy:  You are allowed 2 adult visitors to be with you in the hospital. All hospital visitors should be in good current health.  No small children.     What to Bring:  Please have your ID, insurance cards, and all home medication bottles with you at check in.  Bring your CPAP machine if one is used at home.     Fasting:  Nothing to eat after midnight the night before your procedure. This includes no gum and/or tobacco products. You may have clear liquids limited to: WATER, GATORADE/POWERADE/PEDIALYTE, AND/OR APPLE JUICE up until 2 hours before your arrival time.   Follow your doctor's instructions for taking any medications on the morning of your procedure.  If no instructions for taking medications were given, do not take any medications but bring your medications in their bottles to your procedure check in.     Follow your doctor's preoperative instructions regarding skin prep, bowel prep, bathing, or showering prior to your procedure.  If any special soaps were provided to you, please use according to your doctor's instructions. If " no instructions were given from your doctor, take a good bath or shower with antibacterial soap the night before and the morning of your procedure.  On the morning of procedure, wear loose, comfortable clothing.  No lotions, makeup, perfumes, colognes, deodorant, or jewelry to your procedure.  Removable items (glasses, contact lenses, dentures, retainers, hearing aids) need to be removed for your procedure.  Bring your storage containers for these items if you wear them.     Artificial nails, body jewelry, eyelash extensions, and/or hair extensions with metal clips are not allowed during your surgery.  If you currently wear any of these items, please arrange for them to be removed prior to your arrival to the hospital.     Outpatient or Same Day Surgeries:  Any patients receiving sedation/anesthesia are advised not to drive for 24 hours after their procedure.  We do not allow patients to drive themselves home after discharge.  If you are going home after your procedure, please have someone available to drive you home from the hospital.        You may call the Outpatient Surgery Department at [phone] with any questions or concerns.  We are looking forward to meeting you and taking great care of you for your procedure.  Thank you for choosing [facility] for your surgical needs.

## 2024-10-24 ENCOUNTER — HOSPITAL ENCOUNTER (INPATIENT)
Facility: HOSPITAL | Age: 54
LOS: 1 days | Discharge: HOME | DRG: 761 | End: 2024-10-24
Attending: OBSTETRICS & GYNECOLOGY | Admitting: OBSTETRICS & GYNECOLOGY
Payer: MEDICAID

## 2024-10-24 VITALS
WEIGHT: 187.38 LBS | OXYGEN SATURATION: 98 % | HEIGHT: 64 IN | HEART RATE: 88 BPM | SYSTOLIC BLOOD PRESSURE: 101 MMHG | DIASTOLIC BLOOD PRESSURE: 68 MMHG | RESPIRATION RATE: 18 BRPM | BODY MASS INDEX: 31.99 KG/M2 | TEMPERATURE: 98 F

## 2024-10-24 DIAGNOSIS — N93.9 ABNORMAL UTERINE BLEEDING: ICD-10-CM

## 2024-10-24 PROBLEM — N92.0 EXCESSIVE OR FREQUENT MENSTRUATION: Status: ACTIVE | Noted: 2024-10-24

## 2024-10-24 LAB
ALBUMIN SERPL-MCNC: 3.4 G/DL (ref 3.5–5)
ALBUMIN/GLOB SERPL: 1.1 RATIO (ref 1.1–2)
ALP SERPL-CCNC: 102 UNIT/L (ref 40–150)
ALT SERPL-CCNC: 15 UNIT/L (ref 0–55)
ANION GAP SERPL CALC-SCNC: 5 MEQ/L
APTT PPP: 34.9 SECONDS (ref 23.2–33.7)
AST SERPL-CCNC: 15 UNIT/L (ref 5–34)
BASOPHILS # BLD AUTO: 0.02 X10(3)/MCL
BASOPHILS NFR BLD AUTO: 0.4 %
BILIRUB SERPL-MCNC: 0.4 MG/DL
BUN SERPL-MCNC: 9.7 MG/DL (ref 9.8–20.1)
CALCIUM SERPL-MCNC: 9.4 MG/DL (ref 8.4–10.2)
CHLORIDE SERPL-SCNC: 107 MMOL/L (ref 98–107)
CO2 SERPL-SCNC: 30 MMOL/L (ref 22–29)
CREAT SERPL-MCNC: 0.95 MG/DL (ref 0.55–1.02)
CREAT/UREA NIT SERPL: 10
EOSINOPHIL # BLD AUTO: 0.07 X10(3)/MCL (ref 0–0.9)
EOSINOPHIL NFR BLD AUTO: 1.4 %
ERYTHROCYTE [DISTWIDTH] IN BLOOD BY AUTOMATED COUNT: 13.9 % (ref 11.5–17)
GFR SERPLBLD CREATININE-BSD FMLA CKD-EPI: >60 ML/MIN/1.73/M2
GLOBULIN SER-MCNC: 3.2 GM/DL (ref 2.4–3.5)
GLUCOSE SERPL-MCNC: 86 MG/DL (ref 74–100)
GROUP & RH: NORMAL
HCT VFR BLD AUTO: 39.2 % (ref 37–47)
HGB BLD-MCNC: 12.4 G/DL (ref 12–16)
IMM GRANULOCYTES # BLD AUTO: 0.01 X10(3)/MCL (ref 0–0.04)
IMM GRANULOCYTES NFR BLD AUTO: 0.2 %
INDIRECT COOMBS: NORMAL
INR PPP: 1.6
LYMPHOCYTES # BLD AUTO: 1.95 X10(3)/MCL (ref 0.6–4.6)
LYMPHOCYTES NFR BLD AUTO: 38.3 %
MCH RBC QN AUTO: 27.3 PG (ref 27–31)
MCHC RBC AUTO-ENTMCNC: 31.6 G/DL (ref 33–36)
MCV RBC AUTO: 86.2 FL (ref 80–94)
MONOCYTES # BLD AUTO: 0.45 X10(3)/MCL (ref 0.1–1.3)
MONOCYTES NFR BLD AUTO: 8.8 %
NEUTROPHILS # BLD AUTO: 2.59 X10(3)/MCL (ref 2.1–9.2)
NEUTROPHILS NFR BLD AUTO: 50.9 %
NRBC BLD AUTO-RTO: 0 %
PLATELET # BLD AUTO: 263 X10(3)/MCL (ref 130–400)
PMV BLD AUTO: 10.2 FL (ref 7.4–10.4)
POCT GLUCOSE: 85 MG/DL (ref 70–110)
POTASSIUM SERPL-SCNC: 4.3 MMOL/L (ref 3.5–5.1)
PROT SERPL-MCNC: 6.6 GM/DL (ref 6.4–8.3)
PROTHROMBIN TIME: 18.4 SECONDS (ref 12.5–14.5)
RBC # BLD AUTO: 4.55 X10(6)/MCL (ref 4.2–5.4)
SODIUM SERPL-SCNC: 142 MMOL/L (ref 136–145)
SPECIMEN OUTDATE: NORMAL
WBC # BLD AUTO: 5.09 X10(3)/MCL (ref 4.5–11.5)

## 2024-10-24 PROCEDURE — 25000003 PHARM REV CODE 250: Performed by: OBSTETRICS & GYNECOLOGY

## 2024-10-24 PROCEDURE — 85025 COMPLETE CBC W/AUTO DIFF WBC: CPT | Performed by: OBSTETRICS & GYNECOLOGY

## 2024-10-24 PROCEDURE — 86850 RBC ANTIBODY SCREEN: CPT | Performed by: OBSTETRICS & GYNECOLOGY

## 2024-10-24 PROCEDURE — 85610 PROTHROMBIN TIME: CPT | Performed by: OBSTETRICS & GYNECOLOGY

## 2024-10-24 PROCEDURE — 86900 BLOOD TYPING SEROLOGIC ABO: CPT | Performed by: OBSTETRICS & GYNECOLOGY

## 2024-10-24 PROCEDURE — 80053 COMPREHEN METABOLIC PANEL: CPT | Performed by: OBSTETRICS & GYNECOLOGY

## 2024-10-24 PROCEDURE — 36415 COLL VENOUS BLD VENIPUNCTURE: CPT | Performed by: OBSTETRICS & GYNECOLOGY

## 2024-10-24 PROCEDURE — 85730 THROMBOPLASTIN TIME PARTIAL: CPT | Performed by: OBSTETRICS & GYNECOLOGY

## 2024-10-24 RX ORDER — LACTULOSE 10 G/15ML
20 SOLUTION ORAL EVERY 6 HOURS PRN
OUTPATIENT
Start: 2024-10-24

## 2024-10-24 RX ORDER — LIDOCAINE HYDROCHLORIDE 10 MG/ML
1 INJECTION, SOLUTION EPIDURAL; INFILTRATION; INTRACAUDAL; PERINEURAL ONCE
OUTPATIENT
Start: 2024-10-24 | End: 2024-10-24

## 2024-10-24 RX ORDER — OXYCODONE AND ACETAMINOPHEN 5; 325 MG/1; MG/1
1 TABLET ORAL EVERY 4 HOURS PRN
OUTPATIENT
Start: 2024-10-24

## 2024-10-24 RX ORDER — BISACODYL 10 MG/1
10 SUPPOSITORY RECTAL DAILY PRN
OUTPATIENT
Start: 2024-10-24

## 2024-10-24 RX ORDER — MUPIROCIN 20 MG/G
OINTMENT TOPICAL 2 TIMES DAILY
OUTPATIENT
Start: 2024-10-24 | End: 2024-10-26

## 2024-10-24 RX ORDER — KETOROLAC TROMETHAMINE 30 MG/ML
30 INJECTION, SOLUTION INTRAMUSCULAR; INTRAVENOUS EVERY 8 HOURS
OUTPATIENT
Start: 2024-10-24 | End: 2024-10-25

## 2024-10-24 RX ORDER — IPRATROPIUM BROMIDE AND ALBUTEROL SULFATE 2.5; .5 MG/3ML; MG/3ML
3 SOLUTION RESPIRATORY (INHALATION)
OUTPATIENT
Start: 2024-10-24

## 2024-10-24 RX ORDER — LOPERAMIDE HYDROCHLORIDE 2 MG/1
4 CAPSULE ORAL ONCE
OUTPATIENT
Start: 2024-10-24 | End: 2024-10-24

## 2024-10-24 RX ORDER — ONDANSETRON 4 MG/1
8 TABLET, ORALLY DISINTEGRATING ORAL EVERY 8 HOURS PRN
Status: DISCONTINUED | OUTPATIENT
Start: 2024-10-24 | End: 2024-10-25 | Stop reason: HOSPADM

## 2024-10-24 RX ORDER — PROCHLORPERAZINE EDISYLATE 5 MG/ML
5 INJECTION INTRAMUSCULAR; INTRAVENOUS EVERY 30 MIN PRN
OUTPATIENT
Start: 2024-10-24

## 2024-10-24 RX ORDER — DIPHENHYDRAMINE HCL 25 MG
25 CAPSULE ORAL EVERY 4 HOURS PRN
OUTPATIENT
Start: 2024-10-24

## 2024-10-24 RX ORDER — SODIUM CHLORIDE 9 MG/ML
INJECTION, SOLUTION INTRAVENOUS CONTINUOUS
Status: DISCONTINUED | OUTPATIENT
Start: 2024-10-24 | End: 2024-10-25 | Stop reason: HOSPADM

## 2024-10-24 RX ORDER — HYDROMORPHONE HYDROCHLORIDE 2 MG/ML
0.4 INJECTION, SOLUTION INTRAMUSCULAR; INTRAVENOUS; SUBCUTANEOUS EVERY 5 MIN PRN
OUTPATIENT
Start: 2024-10-24

## 2024-10-24 RX ORDER — HYDROMORPHONE HYDROCHLORIDE 2 MG/ML
1 INJECTION, SOLUTION INTRAMUSCULAR; INTRAVENOUS; SUBCUTANEOUS EVERY 6 HOURS PRN
OUTPATIENT
Start: 2024-10-24

## 2024-10-24 RX ORDER — HYDROMORPHONE HYDROCHLORIDE 2 MG/ML
0.2 INJECTION, SOLUTION INTRAMUSCULAR; INTRAVENOUS; SUBCUTANEOUS EVERY 5 MIN PRN
OUTPATIENT
Start: 2024-10-24

## 2024-10-24 RX ORDER — OXYCODONE AND ACETAMINOPHEN 10; 325 MG/1; MG/1
1 TABLET ORAL EVERY 4 HOURS PRN
OUTPATIENT
Start: 2024-10-24

## 2024-10-24 RX ORDER — MEPERIDINE HYDROCHLORIDE 25 MG/ML
12.5 INJECTION INTRAMUSCULAR; INTRAVENOUS; SUBCUTANEOUS EVERY 10 MIN PRN
OUTPATIENT
Start: 2024-10-24 | End: 2024-10-25

## 2024-10-24 RX ORDER — SODIUM CHLORIDE, SODIUM GLUCONATE, SODIUM ACETATE, POTASSIUM CHLORIDE AND MAGNESIUM CHLORIDE 30; 37; 368; 526; 502 MG/100ML; MG/100ML; MG/100ML; MG/100ML; MG/100ML
INJECTION, SOLUTION INTRAVENOUS CONTINUOUS
OUTPATIENT
Start: 2024-10-24 | End: 2024-11-23

## 2024-10-24 RX ORDER — MIDAZOLAM HYDROCHLORIDE 2 MG/2ML
2 INJECTION, SOLUTION INTRAMUSCULAR; INTRAVENOUS ONCE AS NEEDED
OUTPATIENT
Start: 2024-10-24 | End: 2036-03-22

## 2024-10-24 RX ORDER — IBUPROFEN 600 MG/1
600 TABLET ORAL EVERY 6 HOURS
OUTPATIENT
Start: 2024-10-25

## 2024-10-24 RX ORDER — ONDANSETRON HYDROCHLORIDE 2 MG/ML
4 INJECTION, SOLUTION INTRAVENOUS DAILY PRN
OUTPATIENT
Start: 2024-10-24

## 2024-10-24 RX ORDER — DIPHENHYDRAMINE HYDROCHLORIDE 50 MG/ML
25 INJECTION INTRAMUSCULAR; INTRAVENOUS EVERY 4 HOURS PRN
OUTPATIENT
Start: 2024-10-24

## 2024-10-24 RX ORDER — GLUCAGON 1 MG
1 KIT INJECTION
OUTPATIENT
Start: 2024-10-24

## 2024-10-24 RX ORDER — MUPIROCIN 20 MG/G
OINTMENT TOPICAL
Status: DISCONTINUED | OUTPATIENT
Start: 2024-10-24 | End: 2024-10-25 | Stop reason: HOSPADM

## 2024-10-24 RX ORDER — ONDANSETRON 4 MG/1
8 TABLET, ORALLY DISINTEGRATING ORAL EVERY 6 HOURS PRN
OUTPATIENT
Start: 2024-10-24

## 2024-10-24 RX ORDER — CEFAZOLIN SODIUM 1 G/3ML
2 INJECTION, POWDER, FOR SOLUTION INTRAMUSCULAR; INTRAVENOUS
Status: DISCONTINUED | OUTPATIENT
Start: 2024-10-24 | End: 2024-10-25 | Stop reason: HOSPADM

## 2024-10-24 RX ORDER — FAMOTIDINE 20 MG/1
20 TABLET, FILM COATED ORAL
Status: COMPLETED | OUTPATIENT
Start: 2024-10-24 | End: 2024-10-24

## 2024-10-24 RX ADMIN — FAMOTIDINE 20 MG: 20 TABLET, FILM COATED ORAL at 10:10

## 2024-10-24 RX ADMIN — MUPIROCIN: 20 OINTMENT TOPICAL at 10:10

## 2024-10-24 NOTE — H&P
"Patient ID: Lorelei Norris is a 54 y.o. female.    Chief Complaint: No chief complaint on file.      HPI:  HPIpost menopausal bleeding  Failed conservative medical treatment    Review of Systems nl 12 point review except vag bleeding    Current Facility-Administered Medications   Medication Dose Route Frequency Provider Last Rate Last Admin    0.9%  NaCl infusion   Intravenous Continuous Tang Mack Jr., MD        ceFAZolin injection 2 g  2 g Intravenous On Call Procedure Tang Mack Jr., MD        famotidine tablet 20 mg  20 mg Oral On Call Procedure Tang Mack Jr., MD        mupirocin 2 % ointment   Nasal On Call Procedure Tang Mack Jr., MD           Review of patient's allergies indicates:   Allergen Reactions    Corticosteroids (glucocorticoids) Swelling     Other reaction(s): reports allergic "to all steriods"       Past Medical History:   Diagnosis Date    Abnormal Pap smear of cervix     5 years ago    Asthma     Depression     Diabetes     DVT (deep venous thrombosis) 06/2018    left leg    Excessive or frequent menstruation     Hemorrhage in uterus     History of kidney stones     HLD (hyperlipidemia)     Hypertension     Lower abdominal pain     Neuropathy     Obesity        Past Surgical History:   Procedure Laterality Date    COLONOSCOPY      ESOPHAGOGASTRODUODENOSCOPY      HYSTEROSCOPY      LAPAROSCOPIC CHOLECYSTECTOMY N/A 08/31/2018    Procedure: CHOLECYSTECTOMY, LAPAROSCOPIC;  Surgeon: Aki Watt MD;  Location: [facility] OR;  Service: General;  Laterality: N/A;  No PAT Available-Anes in AM  VIDEO    LITHOTRIPSY      TONSILLECTOMY, ADENOIDECTOMY  06/2017    TUBAL LIGATION         Social History     Socioeconomic History    Marital status:    Tobacco Use    Smoking status: Never    Smokeless tobacco: Never   Substance and Sexual Activity    Alcohol use: Not Currently    Drug use: No    Sexual activity: Yes     Partners: Male     Birth control/protection: None     Social " Drivers of Health     Financial Resource Strain: Low Risk  (6/7/2022)    Overall Financial Resource Strain (CARDIA)     Difficulty of Paying Living Expenses: Not hard at all   Food Insecurity: No Food Insecurity (6/7/2022)    Hunger Vital Sign     Worried About Running Out of Food in the Last Year: Never true     Ran Out of Food in the Last Year: Never true   Transportation Needs: No Transportation Needs (6/7/2022)    PRAPARE - Transportation     Lack of Transportation (Medical): No     Lack of Transportation (Non-Medical): No   Housing Stability: Unknown (6/7/2022)    Housing Stability Vital Sign     Unable to Pay for Housing in the Last Year: No     Unstable Housing in the Last Year: No       Vitals:    10/24/24 0907   BP: 101/68   Pulse: 88   Resp: 18   Temp: 97.7 °F (36.5 °C)       Physical Exam  S1s2 no murmers  Lungs-cta b  Abd-soft ntnd  Ext-no c/c/e  Ext genit-nl  Cervix-parous no lesions  Uterus-nl mobile  Adnexa-no masses  Neuro-intact    Assessment & Plan:  A) pmb    P) sai bso

## 2024-10-24 NOTE — DISCHARGE SUMMARY
[facility]                       [address] [id number]    PATIENT NAME:       ROBE LEOS  YOB: 1970  CSN:                [id number]   MRN:                [id number]  ADMIT DATE:         10/24/2024 08:56:00  PHYSICIAN:          Tang Mack Jr, MD                          DISCHARGE SUMMARY    DATE OF DISCHARGE:      The patient is a 54-year-old female who was scheduled for [facility] BSO in the a.m.    Laboratory evaluation on the same revealed the patient to have an elevated INR   and an elevated PT.  The patient is on Xarelto.  She did come for a preop   appointment and was advised to stop her Xarelto 20 mg after 10/21.  The patient   reports that she misunderstood and she continued to take her Xarelto until 10/23   thus the elevation of the PT/INR.  The reversal medicine was not available in   the hospital.  I discussed this at length with the patient and her significant   other and after discussion and questions answered, the patient was agreeable to   postpone the surgery until we can get a better INR and PT.  She will be   discharged home.  Continue home medicines.    FOLLOWUP:  Follow up with Dr. Mack in 2 weeks to reschedule.        ______________________________  Tang Mack Jr, MD    DJE/AQS  DD:  10/24/2024  Time:  02:33PM  DT:  10/24/2024  Time:  03:08PM  Job #:  [id number]      DISCHARGE SUMMARY

## 2024-12-14 ENCOUNTER — HOSPITAL ENCOUNTER (EMERGENCY)
Facility: HOSPITAL | Age: 54
Discharge: HOME | End: 2024-12-14
Attending: EMERGENCY MEDICINE
Payer: COMMERCIAL

## 2024-12-14 VITALS
WEIGHT: 170 LBS | TEMPERATURE: 98 F | SYSTOLIC BLOOD PRESSURE: 131 MMHG | DIASTOLIC BLOOD PRESSURE: 76 MMHG | RESPIRATION RATE: 17 BRPM | HEIGHT: 64 IN | HEART RATE: 92 BPM | BODY MASS INDEX: 29.02 KG/M2 | OXYGEN SATURATION: 99 %

## 2024-12-14 DIAGNOSIS — M79.604 RIGHT LEG PAIN: ICD-10-CM

## 2024-12-14 DIAGNOSIS — N92.1 MENOMETRORRHAGIA: Primary | ICD-10-CM

## 2024-12-14 DIAGNOSIS — N93.8 DYSFUNCTIONAL UTERINE BLEEDING: ICD-10-CM

## 2024-12-14 LAB
ALBUMIN SERPL BCP-MCNC: 3.4 G/DL (ref 3.5–5.2)
ALP SERPL-CCNC: 97 U/L (ref 40–150)
ALT SERPL W/O P-5'-P-CCNC: 12 U/L (ref 10–44)
ANION GAP SERPL CALC-SCNC: 12 MMOL/L (ref 8–16)
AST SERPL-CCNC: 15 U/L (ref 10–40)
BACTERIA #/AREA URNS HPF: ABNORMAL /HPF
BASOPHILS # BLD AUTO: 0.03 K/UL (ref 0–0.2)
BASOPHILS NFR BLD: 0.5 % (ref 0–1.9)
BILIRUB SERPL-MCNC: 0.3 MG/DL (ref 0.1–1)
BILIRUB UR QL STRIP: NEGATIVE
BUN SERPL-MCNC: 15 MG/DL (ref 6–20)
CALCIUM SERPL-MCNC: 9.7 MG/DL (ref 8.7–10.5)
CHLORIDE SERPL-SCNC: 108 MMOL/L (ref 95–110)
CLARITY UR: ABNORMAL
CO2 SERPL-SCNC: 24 MMOL/L (ref 23–29)
COLOR UR: YELLOW
CREAT SERPL-MCNC: 0.9 MG/DL (ref 0.5–1.4)
DIFFERENTIAL METHOD BLD: ABNORMAL
EOSINOPHIL # BLD AUTO: 0.1 K/UL (ref 0–0.5)
EOSINOPHIL NFR BLD: 1.4 % (ref 0–8)
ERYTHROCYTE [DISTWIDTH] IN BLOOD BY AUTOMATED COUNT: 13.8 % (ref 11.5–14.5)
EST. GFR  (NO RACE VARIABLE): >60 ML/MIN/1.73 M^2
GLUCOSE SERPL-MCNC: 98 MG/DL (ref 70–110)
GLUCOSE UR QL STRIP: NEGATIVE
HCT VFR BLD AUTO: 39 % (ref 37–48.5)
HGB BLD-MCNC: 12.3 G/DL (ref 12–16)
HGB UR QL STRIP: ABNORMAL
HYALINE CASTS #/AREA URNS LPF: 0 /LPF
IMM GRANULOCYTES # BLD AUTO: 0.01 K/UL (ref 0–0.04)
IMM GRANULOCYTES NFR BLD AUTO: 0.2 % (ref 0–0.5)
INR PPP: 0.9 (ref 0.8–1.2)
KETONES UR QL STRIP: NEGATIVE
LEUKOCYTE ESTERASE UR QL STRIP: ABNORMAL
LYMPHOCYTES # BLD AUTO: 2 K/UL (ref 1–4.8)
LYMPHOCYTES NFR BLD: 34.9 % (ref 18–48)
MCH RBC QN AUTO: 27.3 PG (ref 27–31)
MCHC RBC AUTO-ENTMCNC: 31.5 G/DL (ref 32–36)
MCV RBC AUTO: 87 FL (ref 82–98)
MICROSCOPIC COMMENT: ABNORMAL
MONOCYTES # BLD AUTO: 0.5 K/UL (ref 0.3–1)
MONOCYTES NFR BLD: 7.7 % (ref 4–15)
NEUTROPHILS # BLD AUTO: 3.2 K/UL (ref 1.8–7.7)
NEUTROPHILS NFR BLD: 55.3 % (ref 38–73)
NITRITE UR QL STRIP: NEGATIVE
NON-SQ EPI CELLS #/AREA URNS HPF: 1 /HPF
NRBC BLD-RTO: 0 /100 WBC
PH UR STRIP: 7 [PH] (ref 5–8)
PLATELET # BLD AUTO: 293 K/UL (ref 150–450)
PMV BLD AUTO: 9.8 FL (ref 9.2–12.9)
POTASSIUM SERPL-SCNC: 4.1 MMOL/L (ref 3.5–5.1)
PROT SERPL-MCNC: 7.3 G/DL (ref 6–8.4)
PROT UR QL STRIP: ABNORMAL
PROTHROMBIN TIME: 10.3 SEC (ref 9–12.5)
RBC # BLD AUTO: 4.5 M/UL (ref 4–5.4)
RBC #/AREA URNS HPF: >100 /HPF (ref 0–4)
SODIUM SERPL-SCNC: 144 MMOL/L (ref 136–145)
SP GR UR STRIP: 1.01 (ref 1–1.03)
SQUAMOUS #/AREA URNS HPF: 23 /HPF
URN SPEC COLLECT METH UR: ABNORMAL
UROBILINOGEN UR STRIP-ACNC: NEGATIVE EU/DL
WBC # BLD AUTO: 5.82 K/UL (ref 3.9–12.7)
WBC #/AREA URNS HPF: >100 /HPF (ref 0–5)

## 2024-12-14 PROCEDURE — 85025 COMPLETE CBC W/AUTO DIFF WBC: CPT | Performed by: EMERGENCY MEDICINE

## 2024-12-14 PROCEDURE — 99285 EMERGENCY DEPT VISIT HI MDM: CPT | Mod: 25

## 2024-12-14 PROCEDURE — 81000 URINALYSIS NONAUTO W/SCOPE: CPT | Performed by: EMERGENCY MEDICINE

## 2024-12-14 PROCEDURE — 87086 URINE CULTURE/COLONY COUNT: CPT | Performed by: EMERGENCY MEDICINE

## 2024-12-14 PROCEDURE — 85610 PROTHROMBIN TIME: CPT | Performed by: EMERGENCY MEDICINE

## 2024-12-14 PROCEDURE — 63600175 PHARM REV CODE 636 W HCPCS: Performed by: EMERGENCY MEDICINE

## 2024-12-14 PROCEDURE — 96374 THER/PROPH/DIAG INJ IV PUSH: CPT

## 2024-12-14 PROCEDURE — 80053 COMPREHEN METABOLIC PANEL: CPT | Performed by: EMERGENCY MEDICINE

## 2024-12-14 RX ORDER — MORPHINE SULFATE 2 MG/ML
2 INJECTION, SOLUTION INTRAMUSCULAR; INTRAVENOUS
Status: COMPLETED | OUTPATIENT
Start: 2024-12-14 | End: 2024-12-14

## 2024-12-14 RX ORDER — CEPHALEXIN 500 MG/1
500 CAPSULE ORAL 4 TIMES DAILY
Qty: 20 CAPSULE | Refills: 0 | Status: SHIPPED | OUTPATIENT
Start: 2024-12-14 | End: 2024-12-19

## 2024-12-14 RX ORDER — HYDROCODONE BITARTRATE AND ACETAMINOPHEN 10; 325 MG/1; MG/1
1 TABLET ORAL EVERY 6 HOURS PRN
Qty: 12 TABLET | Refills: 0 | Status: SHIPPED | OUTPATIENT
Start: 2024-12-14

## 2024-12-14 RX ADMIN — MORPHINE SULFATE 2 MG: 2 INJECTION, SOLUTION INTRAMUSCULAR; INTRAVENOUS at 01:12

## 2024-12-14 NOTE — DISCHARGE INSTRUCTIONS
Please follow up with Ob/Gyn as soon as possible. You need to be evaluated by them in person as discussed.

## 2024-12-14 NOTE — ED PROVIDER NOTES
"Encounter Date: 12/14/2024       History     Chief Complaint   Patient presents with    Vaginal Bleeding     C/o vaginal bleeding w/ lower ABD cramping x2 months. Denies taking anything for sx.   Pt also c/o R ankle pain x2 weeks. Denies trauma. NAD, ambulatory in triage.      Patient is a 54-year-old female with a history of excessive or frequent menstruation, hemorrhage in uterus, diabetes, abnormal Pap smear of the cervix who presents to the ED with complaint of vaginal bleeding.  Patient reports worsening and heavier vaginal bleeding with clots over the past 2 months.  She states she has heavy vaginal bleeding on a daily basis.  She states that she was evaluated for this and was scheduled to undergo presumably total abdominal hysterectomy but could not secondary to some issue with her use of anticoagulation.  She states that she recently relocated from [place] to [place] in his continued vaginal bleeding.  She denies any abdominal or pelvic pain.  She denies any current use of blood thinning medications.  She has not established care with a gynecologist here in the [place] area nor has she sought out treatment other than today.  Additionally she reports pain and swelling to the right calf for several days.  She states she has a history of DVT in that right lower extremity.  She denies any trauma bruising numbness tingling or inability to ambulate on the aforementioned extremity.      Review of patient's allergies indicates:   Allergen Reactions    Corticosteroids (glucocorticoids) Swelling     Other reaction(s): reports allergic "to all steriods"     Past Medical History:   Diagnosis Date    Abnormal Pap smear of cervix     5 years ago    Asthma     Depression     Diabetes     DVT (deep venous thrombosis) 06/2018    left leg    Excessive or frequent menstruation     Hemorrhage in uterus     History of kidney stones     HLD (hyperlipidemia)     Hypertension     Lower abdominal pain     Neuropathy     " Obesity      Past Surgical History:   Procedure Laterality Date    COLONOSCOPY      ESOPHAGOGASTRODUODENOSCOPY      HYSTEROSCOPY      LAPAROSCOPIC CHOLECYSTECTOMY N/A 08/31/2018    Procedure: CHOLECYSTECTOMY, LAPAROSCOPIC;  Surgeon: Aki Watt MD;  Location: [facility] OR;  Service: General;  Laterality: N/A;  No PAT Available-Anes in AM  VIDEO    LITHOTRIPSY      TONSILLECTOMY, ADENOIDECTOMY  06/2017    TUBAL LIGATION       No family history on file.  Social History     Tobacco Use    Smoking status: Never    Smokeless tobacco: Never   Substance Use Topics    Alcohol use: Not Currently    Drug use: No     Review of Systems   Constitutional:  Negative for chills and fatigue.   Cardiovascular:  Negative for chest pain.   Gastrointestinal:  Negative for abdominal pain, nausea and vomiting.   Genitourinary:  Positive for vaginal bleeding. Negative for vaginal discharge and vaginal pain.   Skin:  Negative for pallor and wound.   Neurological:  Negative for dizziness, syncope and facial asymmetry.       Physical Exam     Initial Vitals [12/14/24 1108]   BP Pulse Resp Temp SpO2   127/74 99 20 97.9 °F (36.6 °C) 99 %      MAP       --         Physical Exam    Vitals reviewed.  Constitutional: She appears well-developed and well-nourished.   HENT:   Head: Normocephalic and atraumatic.   Eyes: EOM are normal. Pupils are equal, round, and reactive to light.   Neck: Neck supple.   Normal range of motion.  Pulmonary/Chest: Breath sounds normal.   Abdominal: Abdomen is soft. Bowel sounds are normal.   The abdomen is soft. There is no rebound or guarding. Normal bowel sounds in all four quadrants. Negative So's sign. Negative McBurney's point tenderness Negative heel tap. No masses, fluid wave, ecchymosis or other skin changes appreciated on physical exam.     Musculoskeletal:         General: No tenderness or edema. Normal range of motion.      Cervical back: Normal range of motion and neck supple.      Comments: No  significant swelling to the RLE     Neurological: She is alert and oriented to person, place, and time. She has normal strength. GCS score is 15. GCS eye subscore is 4. GCS verbal subscore is 5. GCS motor subscore is 6.   Skin: Skin is warm. Capillary refill takes less than 2 seconds.   Psychiatric: She has a normal mood and affect.         ED Course   Procedures  Labs Reviewed   CBC W/ AUTO DIFFERENTIAL - Abnormal       Result Value    WBC 5.82      RBC 4.50      Hemoglobin 12.3      Hematocrit 39.0      MCV 87      MCH 27.3      MCHC 31.5 (*)     RDW 13.8      Platelets 293      MPV 9.8      Immature Granulocytes 0.2      Gran # (ANC) 3.2      Immature Grans (Abs) 0.01      Lymph # 2.0      Mono # 0.5      Eos # 0.1      Baso # 0.03      nRBC 0      Gran % 55.3      Lymph % 34.9      Mono % 7.7      Eosinophil % 1.4      Basophil % 0.5      Differential Method Automated     COMPREHENSIVE METABOLIC PANEL - Abnormal    Sodium 144      Potassium 4.1      Chloride 108      CO2 24      Glucose 98      BUN 15      Creatinine 0.9      Calcium 9.7      Total Protein 7.3      Albumin 3.4 (*)     Total Bilirubin 0.3      Alkaline Phosphatase 97      AST 15      ALT 12      eGFR >60      Anion Gap 12     URINALYSIS, REFLEX TO URINE CULTURE - Abnormal    Specimen UA Urine, Clean Catch      Color, UA Yellow      Appearance, UA Cloudy (*)     pH, UA 7.0      Specific Gravity, UA 1.015      Protein, UA 2+ (*)     Glucose, UA Negative      Ketones, UA Negative      Bilirubin (UA) Negative      Occult Blood UA 3+ (*)     Nitrite, UA Negative      Urobilinogen, UA Negative      Leukocytes, UA 3+ (*)     Narrative:     Specimen Source->Urine   PROTIME-INR    Prothrombin Time 10.3      INR 0.9     URINALYSIS MICROSCOPIC          Imaging Results              US Pelvis Comp with Transvag NON-OB (xpd) (Final result)  Result time 12/14/24 13:13:12   Procedure changed from US Pelvis Complete Non OB     Final result by Prosper Collado MD  (12/14/24 13:13:12)                   Impression:      No acute sonographic abnormality.  Normal thickness endometrium.    Several incidental/nonemergent findings in the body of the report.      Electronically signed by: Prosper Collado MD  Date:    12/14/2024  Time:    13:13               Narrative:    EXAMINATION:  US PELVIS COMP WITH TRANSVAG NON-OB (XPD)    CLINICAL HISTORY:  vaginal bleeding;    TECHNIQUE:  Transabdominal sonography of the pelvis was performed, followed by transvaginal sonography to better evaluate the uterus and ovaries.    COMPARISON:  CT abdomen and pelvis 09/01/2024    FINDINGS:  Uterus:    Measures 7.3 x 3.4 x 5.1 cm in dimensions.  No discrete uterine fibroids identified.  The endometrium is normal thickness measuring 4 mm, noting approximate 2 x 4 mm nonvascular anechoic structure at the mid to upper body, may reflect small endometrial cyst.  Approximate 1-2 mm echogenic focus at the cervix, likely nonspecific calcification.  Small nabothian cyst noted.  Trace volume simple appearing fluid in the cervical canal, commonly physiologic in this age group.    Ovaries:    The ovaries are normal in size.  The right ovary measures 3.3 x 1.9 x 2.2 cm.  The left ovary measures 2.9 x 2 x 1.6 cm. Within the right ovary there is a 1.8 x 0.8 x 1.6 cm well-circumscribed nonvascular anechoic structure suggestive of a simple dominant follicle.  Doppler flow demonstrated to both ovaries.    Free Fluid:    None.                                       US Lower Extremity Veins Right (Final result)  Result time 12/14/24 12:59:43      Final result by Prosper Collado MD (12/14/24 12:59:43)                   Impression:      No evidence of deep venous thrombosis in the right lower extremity.      Electronically signed by: Prosper Collado MD  Date:    12/14/2024  Time:    12:59               Narrative:    EXAMINATION:  US LOWER EXTREMITY VEINS RIGHT    CLINICAL HISTORY:  Pain in right leg    TECHNIQUE:  Duplex and  color flow Doppler evaluation and graded compression of the right lower extremity veins was performed.    COMPARISON:  Left lower extremity venous Doppler ultrasound 07/20/2022, bilateral lower extremity venous Doppler ultrasound 09/14/2018    FINDINGS:  Right thigh veins: The common femoral, femoral, popliteal, upper greater saphenous, and deep femoral veins are patent and free of thrombus. The veins are normally compressible and have normal phasic flow and augmentation response.    Right calf veins: The visualized calf veins are patent.    Contralateral CFV: The contralateral (left) common femoral vein is patent and free of thrombus.    Miscellaneous: None                                       Medications   morphine injection 2 mg (2 mg Intravenous Given 12/14/24 1332)     Medical Decision Making             ED Course as of 12/14/24 1353   Sat Dec 14, 2024   1219 Hemoglobin: 12.3 [LC]   1219 Hematocrit: 39.0  Stable Hb/Hct per my independent interpretation.  [LC]   1308 PT: 10.3 [LC]   1308 INR: 0.9 [LC]   1308 No findings of DVT to the RLE per final radiology read.  [LC]   1344 Blood, UA(!): 3+ [LC]   1344 Leukocyte Esterase, UA(!): 3+ [LC]   1344 Appearance, UA(!): Cloudy [LC]      ED Course User Index  [LC] Reji Denny MD                 Medical Decision Making:   Initial Assessment:   See HPI   Clinical Tests:   Lab Tests: Reviewed and Ordered  Radiological Study: Ordered and Reviewed  ED Management:  - ED workup largely unremarkable; the patient is hemodynamically stable her H&H are within normal limits; her formal ultrasound demonstrates no acute abnormality or discernible etiology of her reported dysfunctional uterine bleeding; her formal ultrasound of the right lower extremity is negative; will discharge patient home at this time with prescription for Norco, Keflex in light of 3+ leuk esterase  - pt would benefit from OP eval by gyn  - No further intervention is indicated at this time after having  taken into account the patient's history, physical exam findings, and empirical and objective data obtained during the patient's emergency department workup.   - The patient is at low risk for an emergent medical condition at this time, and I am of the belief that that it is safe to discharge the patient from the emergency department.   - The patient is instructed to follow up as outpatient as indicated on the discharge paperwork.    - I have discussed the specifics of the workup with the patient and the patient has verbalized understanding of the details of the workup, the diagnosis, the treatment plan, and the need for outpatient follow-up.    - Although the patient has no emergent etiology today this does not preclude the development of an emergent condition so, in addition, I have advised the patient that they can return to the ED and/or activate EMS at any time with worsening of their symptoms, change of their symptoms, or with any other medical complaint.    - The patient remained comfortable and stable during their visit in the ED.    - Discharge and follow-up instructions discussed with the patient who expressed understanding and willingness to comply with my recommendations.  - Results of all emergency department tests  discussed thoroughly with patient; all patient questions answered; pt in agreement with plan  - Pt instructed to follow up with PCP in 2-3 days for recheck of today's complaints  - Pt given strict emergency department return precautions for any new or worsening of symptoms  - Pt discharged from the emergency department in stable condition, in no acute distress                Clinical Impression:  Final diagnoses:  [M79.604] Right leg pain  [N93.8] Dysfunctional uterine bleeding  [N92.1] Menometrorrhagia (Primary)          ED Disposition Condition    Discharge Stable          ED Prescriptions       Medication Sig Dispense Start Date End Date Auth. Provider    HYDROcodone-acetaminophen (NORCO)   mg per tablet Take 1 tablet by mouth every 6 (six) hours as needed for Pain. 12 tablet 12/14/2024 -- Reji Denny MD    cephALEXin (KEFLEX) 500 MG capsule Take 1 capsule (500 mg total) by mouth 4 (four) times daily. for 5 days 20 capsule 12/14/2024 12/19/2024 Reji Denny MD          Follow-up Information       Follow up With Specialties Details Why Contact Info    Kamille Domingo MD Obstetrics and Gynecology Schedule an appointment as soon as possible for a visit   [address] [facility]  [address] [place] [id number]  [phone]               Reji Denny MD  12/14/24 0432       Reji Denny MD  12/14/24 [id number]

## 2024-12-14 NOTE — ED NOTES
Pt ambulatory independently to bathroom, gait steady. Pt in no visible distress. Unable to obtain urine at this time.

## 2024-12-14 NOTE — ED NOTES
Pt presenting to ED for vaginal bleeding. Pt was scheduled to have hysterectomy 10/24/24 at [facility] but surgery was cancelled due to patient being on blood thinners. Upon arrival to room, Pt AAOx4. Also reports intermittent abdominal pain. VSS and pt in no visible distress.

## 2024-12-14 NOTE — ED NOTES
Exercise session details Pt discharged from ED with all belongings. Reviewed discharge instructions including medications and f/u MD appts, pt verbalized understanding. Pt ambulatory from ED independently, gait steady. VSS and pt in no visible distress.

## 2024-12-14 NOTE — ED NOTES
Pt also reporting RLE pain x2 weeks. Pt has pictures of bright red blood with clotting she experienced last night. No active bleeding noted at present.

## 2024-12-15 LAB
BACTERIA UR CULT: NORMAL
BACTERIA UR CULT: NORMAL

## (undated) DEVICE — TRAY SKIN SCRUB WET PREMIUM

## (undated) DEVICE — GLOVE BIOGEL ECLIPSE SZ 7.5

## (undated) DEVICE — PAD PINK TRENDELENBURG POS XL

## (undated) DEVICE — BLADE SURG STAINLESS STEEL #10

## (undated) DEVICE — SUT 1 18IN COATED VICRYL U

## (undated) DEVICE — DRAPE LEGGINGS CUFF 31X48IN

## (undated) DEVICE — DISSECTOR EPIX LAPA 5MMX35CM

## (undated) DEVICE — KIT HYSTEROSCOPIC TRUCLEAR

## (undated) DEVICE — APPLICATOR STRL COT 2INNR 6IN

## (undated) DEVICE — SPONGE DERMA 8PLY 2X2

## (undated) DEVICE — DRESSING XEROFORM FOIL PK 1X8

## (undated) DEVICE — DRESSING TEGADERM 2 3/8 X 2.75

## (undated) DEVICE — SUT 0 VICRYL / UR6 (J603)

## (undated) DEVICE — CATH RED RUBBER LATEX 14F 16IN

## (undated) DEVICE — CATH URETHRAL 16FR RED

## (undated) DEVICE — COVER TABLE HVY DTY 60X90IN

## (undated) DEVICE — APPLICATOR CHLORAPREP ORN 26ML

## (undated) DEVICE — PAD PREP 50/CA

## (undated) DEVICE — COVER OVERHEAD SURG LT BLUE

## (undated) DEVICE — ADHESIVE DERMABOND ADVANCED

## (undated) DEVICE — CANNULA ENDOPATH XCEL 5X100MM

## (undated) DEVICE — ELECTRODE PATIENT RETURN DISP

## (undated) DEVICE — MANIFOLD 4 PORT

## (undated) DEVICE — KIT SURGICAL TURNOVER

## (undated) DEVICE — SEE L#120831

## (undated) DEVICE — SEE MEDLINE ITEM 146355

## (undated) DEVICE — SEE MEDLINE ITEM 157116

## (undated) DEVICE — IRRIGATOR SUCTION W/TIP

## (undated) DEVICE — TROCAR ENDOPATH XCEL 5MM 7.5CM

## (undated) DEVICE — SOL .9NACL PF 100 ML

## (undated) DEVICE — UNDERGLOVES BIOGEL PI SZ 7 LF

## (undated) DEVICE — SUT MONOCRYL 4-0 PS-2

## (undated) DEVICE — SCISSOR 5MMX35CM DIRECT DRIVE

## (undated) DEVICE — SOL IRRI STRL WATER 1000ML

## (undated) DEVICE — IRRIGATOR ENDOSCOPY DISP.

## (undated) DEVICE — NDL INSUF ULTRA VERESS 120MM

## (undated) DEVICE — SET IRR URLGY 2LINE UNIV SPIKE

## (undated) DEVICE — SYR IRRIGATION BULB STER 60ML

## (undated) DEVICE — DRAPE SLUSH WARMER 66X44IN

## (undated) DEVICE — SEE MEDLINE ITEM 154981

## (undated) DEVICE — SEALER LIGASURE LAP 37CM 5MM

## (undated) DEVICE — SEE MEDLINE ITEM 152622

## (undated) DEVICE — SEE MEDLINE ITEM 152532

## (undated) DEVICE — GLOVE SURGICAL LATEX SZ 7

## (undated) DEVICE — NDL HYPO REG 25G X 1 1/2

## (undated) DEVICE — DRESSING XEROFORM 1X8IN

## (undated) DEVICE — SEE MEDLINE ITEM 156952

## (undated) DEVICE — ELECTRODE REM PLYHSV RETURN 9

## (undated) DEVICE — SEE MEDLINE ITEM 156955

## (undated) DEVICE — SUT 0 VICRYL PLUS CT-1 27IN

## (undated) DEVICE — APPLICATOR ARISTA FLEX XL

## (undated) DEVICE — TRAY CATH FOL SIL URIMTR 16FR

## (undated) DEVICE — TROCAR ENDOPATH XCEL 5X100MM

## (undated) DEVICE — NDL 22GA X1 1/2 REG BEVEL

## (undated) DEVICE — SOL NS 1000CC

## (undated) DEVICE — SEE MEDLINE ITEM 157117

## (undated) DEVICE — SET PROCEDURE HTA PRO CERVA

## (undated) DEVICE — COUNTER SHARPS DEVON 2 MAGNET

## (undated) DEVICE — SYR 10CC LUER LOCK

## (undated) DEVICE — DRESSING TELFA N ADH 3X8

## (undated) DEVICE — GOWN SURGICAL XX LARGE X LONG

## (undated) DEVICE — PACK GYN LAPAROSCOPY HZD

## (undated) DEVICE — APPLIER CLIP ENDO LIGAMAX 5MM

## (undated) DEVICE — SUT MCRYL PLUS 4-0 PS2 27IN

## (undated) DEVICE — DRAPE LAVH LAPAROSCOPY W/FLUID

## (undated) DEVICE — GLOVE PROTEXIS HYDROGEL SZ8

## (undated) DEVICE — PACK BASIC

## (undated) DEVICE — SOL NACL IRR 3000ML